# Patient Record
Sex: MALE | Race: OTHER | NOT HISPANIC OR LATINO | URBAN - METROPOLITAN AREA
[De-identification: names, ages, dates, MRNs, and addresses within clinical notes are randomized per-mention and may not be internally consistent; named-entity substitution may affect disease eponyms.]

---

## 2019-04-26 ENCOUNTER — EMERGENCY (EMERGENCY)
Facility: HOSPITAL | Age: 24
LOS: 1 days | Discharge: LEFT BEFORE DISCHARGE | End: 2019-04-26
Admitting: EMERGENCY MEDICINE

## 2019-04-26 VITALS
OXYGEN SATURATION: 100 % | TEMPERATURE: 99 F | DIASTOLIC BLOOD PRESSURE: 69 MMHG | RESPIRATION RATE: 18 BRPM | HEART RATE: 82 BPM | SYSTOLIC BLOOD PRESSURE: 143 MMHG
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
## 2022-02-15 ENCOUNTER — OUTPATIENT (OUTPATIENT)
Dept: OUTPATIENT SERVICES | Facility: HOSPITAL | Age: 27
LOS: 1 days | Discharge: HOME | End: 2022-02-15

## 2022-02-16 DIAGNOSIS — F32.A DEPRESSION, UNSPECIFIED: ICD-10-CM

## 2022-02-16 DIAGNOSIS — F12.20 CANNABIS DEPENDENCE, UNCOMPLICATED: ICD-10-CM

## 2023-10-11 ENCOUNTER — INPATIENT (INPATIENT)
Facility: HOSPITAL | Age: 28
LOS: 5 days | Discharge: HOME | End: 2023-10-17
Attending: INTERNAL MEDICINE | Admitting: INTERNAL MEDICINE
Payer: MEDICAID

## 2023-10-11 VITALS
OXYGEN SATURATION: 100 % | DIASTOLIC BLOOD PRESSURE: 98 MMHG | RESPIRATION RATE: 18 BRPM | SYSTOLIC BLOOD PRESSURE: 199 MMHG | HEART RATE: 127 BPM

## 2023-10-11 DIAGNOSIS — R45.1 RESTLESSNESS AND AGITATION: ICD-10-CM

## 2023-10-11 DIAGNOSIS — M50.21 OTHER CERVICAL DISC DISPLACEMENT, HIGH CERVICAL REGION: ICD-10-CM

## 2023-10-11 DIAGNOSIS — Z91.51 PERSONAL HISTORY OF SUICIDAL BEHAVIOR: ICD-10-CM

## 2023-10-11 DIAGNOSIS — M50.30 OTHER CERVICAL DISC DEGENERATION, UNSPECIFIED CERVICAL REGION: ICD-10-CM

## 2023-10-11 DIAGNOSIS — F31.9 BIPOLAR DISORDER, UNSPECIFIED: ICD-10-CM

## 2023-10-11 DIAGNOSIS — Z91.52 PERSONAL HISTORY OF NONSUICIDAL SELF-HARM: ICD-10-CM

## 2023-10-11 DIAGNOSIS — Z78.1 PHYSICAL RESTRAINT STATUS: ICD-10-CM

## 2023-10-11 LAB
ALBUMIN SERPL ELPH-MCNC: 4.2 G/DL — SIGNIFICANT CHANGE UP (ref 3.3–5)
ALP SERPL-CCNC: 72 U/L — SIGNIFICANT CHANGE UP (ref 40–120)
ALT FLD-CCNC: 30 U/L — SIGNIFICANT CHANGE UP (ref 12–78)
ANION GAP SERPL CALC-SCNC: 12 MMOL/L — SIGNIFICANT CHANGE UP (ref 5–17)
APAP SERPL-MCNC: < 2 UG/ML (ref 10–30)
AST SERPL-CCNC: 37 U/L — SIGNIFICANT CHANGE UP (ref 15–37)
BASOPHILS # BLD AUTO: 0.07 K/UL — SIGNIFICANT CHANGE UP (ref 0–0.2)
BASOPHILS NFR BLD AUTO: 0.7 % — SIGNIFICANT CHANGE UP (ref 0–2)
BILIRUB SERPL-MCNC: 1.1 MG/DL — SIGNIFICANT CHANGE UP (ref 0.2–1.2)
BUN SERPL-MCNC: 13 MG/DL — SIGNIFICANT CHANGE UP (ref 7–23)
CALCIUM SERPL-MCNC: 8.6 MG/DL — SIGNIFICANT CHANGE UP (ref 8.5–10.1)
CHLORIDE SERPL-SCNC: 104 MMOL/L — SIGNIFICANT CHANGE UP (ref 96–108)
CK SERPL-CCNC: 1043 U/L — HIGH (ref 26–308)
CO2 SERPL-SCNC: 23 MMOL/L — SIGNIFICANT CHANGE UP (ref 22–31)
CREAT SERPL-MCNC: 1.65 MG/DL — HIGH (ref 0.5–1.3)
EGFR: 57 ML/MIN/1.73M2 — LOW
EOSINOPHIL # BLD AUTO: 0.03 K/UL — SIGNIFICANT CHANGE UP (ref 0–0.5)
EOSINOPHIL NFR BLD AUTO: 0.3 % — SIGNIFICANT CHANGE UP (ref 0–6)
ETHANOL SERPL-MCNC: <10 MG/DL — SIGNIFICANT CHANGE UP (ref 0–10)
GLUCOSE SERPL-MCNC: 158 MG/DL — HIGH (ref 70–99)
HCT VFR BLD CALC: 44 % — SIGNIFICANT CHANGE UP (ref 39–50)
HGB BLD-MCNC: 14 G/DL — SIGNIFICANT CHANGE UP (ref 13–17)
IMM GRANULOCYTES NFR BLD AUTO: 0.6 % — SIGNIFICANT CHANGE UP (ref 0–0.9)
LYMPHOCYTES # BLD AUTO: 1.11 K/UL — SIGNIFICANT CHANGE UP (ref 1–3.3)
LYMPHOCYTES # BLD AUTO: 11.1 % — LOW (ref 13–44)
MCHC RBC-ENTMCNC: 23.1 PG — LOW (ref 27–34)
MCHC RBC-ENTMCNC: 31.8 G/DL — LOW (ref 32–36)
MCV RBC AUTO: 72.6 FL — LOW (ref 80–100)
MONOCYTES # BLD AUTO: 0.85 K/UL — SIGNIFICANT CHANGE UP (ref 0–0.9)
MONOCYTES NFR BLD AUTO: 8.5 % — SIGNIFICANT CHANGE UP (ref 2–14)
NEUTROPHILS # BLD AUTO: 7.89 K/UL — HIGH (ref 1.8–7.4)
NEUTROPHILS NFR BLD AUTO: 78.8 % — HIGH (ref 43–77)
NRBC # BLD: 0 /100 WBCS — SIGNIFICANT CHANGE UP (ref 0–0)
PLATELET # BLD AUTO: 221 K/UL — SIGNIFICANT CHANGE UP (ref 150–400)
POTASSIUM SERPL-MCNC: 3.1 MMOL/L — LOW (ref 3.5–5.3)
POTASSIUM SERPL-SCNC: 3.1 MMOL/L — LOW (ref 3.5–5.3)
PROT SERPL-MCNC: 8.3 GM/DL — SIGNIFICANT CHANGE UP (ref 6–8.3)
RBC # BLD: 6.06 M/UL — HIGH (ref 4.2–5.8)
RBC # FLD: 17.7 % — HIGH (ref 10.3–14.5)
SALICYLATES SERPL-MCNC: <1.7 MG/DL — LOW (ref 2.8–20)
SODIUM SERPL-SCNC: 139 MMOL/L — SIGNIFICANT CHANGE UP (ref 135–145)
WBC # BLD: 10.01 K/UL — SIGNIFICANT CHANGE UP (ref 3.8–10.5)
WBC # FLD AUTO: 10.01 K/UL — SIGNIFICANT CHANGE UP (ref 3.8–10.5)

## 2023-10-11 PROCEDURE — 99291 CRITICAL CARE FIRST HOUR: CPT | Mod: 25

## 2023-10-11 PROCEDURE — 70450 CT HEAD/BRAIN W/O DYE: CPT | Mod: 26,MA

## 2023-10-11 PROCEDURE — 93010 ELECTROCARDIOGRAM REPORT: CPT

## 2023-10-11 PROCEDURE — 71045 X-RAY EXAM CHEST 1 VIEW: CPT | Mod: 26

## 2023-10-11 PROCEDURE — 31500 INSERT EMERGENCY AIRWAY: CPT

## 2023-10-11 PROCEDURE — 72125 CT NECK SPINE W/O DYE: CPT | Mod: 26,MA

## 2023-10-11 PROCEDURE — 99292 CRITICAL CARE ADDL 30 MIN: CPT | Mod: 25

## 2023-10-11 RX ORDER — FENTANYL CITRATE 50 UG/ML
0.5 INJECTION INTRAVENOUS
Qty: 2500 | Refills: 0 | Status: DISCONTINUED | OUTPATIENT
Start: 2023-10-11 | End: 2023-10-12

## 2023-10-11 RX ORDER — KETAMINE HYDROCHLORIDE 100 MG/ML
400 INJECTION INTRAMUSCULAR; INTRAVENOUS ONCE
Refills: 0 | Status: DISCONTINUED | OUTPATIENT
Start: 2023-10-11 | End: 2023-10-11

## 2023-10-11 RX ORDER — POTASSIUM CHLORIDE 20 MEQ
10 PACKET (EA) ORAL
Refills: 0 | Status: COMPLETED | OUTPATIENT
Start: 2023-10-11 | End: 2023-10-11

## 2023-10-11 RX ORDER — PROPOFOL 10 MG/ML
10 INJECTION, EMULSION INTRAVENOUS
Qty: 1000 | Refills: 0 | Status: DISCONTINUED | OUTPATIENT
Start: 2023-10-11 | End: 2023-10-12

## 2023-10-11 RX ORDER — INFLUENZA VIRUS VACCINE 15; 15; 15; 15 UG/.5ML; UG/.5ML; UG/.5ML; UG/.5ML
0.5 SUSPENSION INTRAMUSCULAR ONCE
Refills: 0 | Status: DISCONTINUED | OUTPATIENT
Start: 2023-10-11 | End: 2023-10-17

## 2023-10-11 RX ORDER — SODIUM CHLORIDE 9 MG/ML
1000 INJECTION, SOLUTION INTRAVENOUS ONCE
Refills: 0 | Status: COMPLETED | OUTPATIENT
Start: 2023-10-11 | End: 2023-10-11

## 2023-10-11 RX ORDER — FENTANYL CITRATE 50 UG/ML
50 INJECTION INTRAVENOUS ONCE
Refills: 0 | Status: DISCONTINUED | OUTPATIENT
Start: 2023-10-11 | End: 2023-10-11

## 2023-10-11 RX ORDER — MIDAZOLAM HYDROCHLORIDE 1 MG/ML
0.02 INJECTION, SOLUTION INTRAMUSCULAR; INTRAVENOUS
Qty: 100 | Refills: 0 | Status: DISCONTINUED | OUTPATIENT
Start: 2023-10-11 | End: 2023-10-12

## 2023-10-11 RX ORDER — TETANUS TOXOID, REDUCED DIPHTHERIA TOXOID AND ACELLULAR PERTUSSIS VACCINE, ADSORBED 5; 2.5; 8; 8; 2.5 [IU]/.5ML; [IU]/.5ML; UG/.5ML; UG/.5ML; UG/.5ML
0.5 SUSPENSION INTRAMUSCULAR ONCE
Refills: 0 | Status: COMPLETED | OUTPATIENT
Start: 2023-10-11 | End: 2023-10-11

## 2023-10-11 RX ORDER — HEPARIN SODIUM 5000 [USP'U]/ML
5000 INJECTION INTRAVENOUS; SUBCUTANEOUS EVERY 8 HOURS
Refills: 0 | Status: DISCONTINUED | OUTPATIENT
Start: 2023-10-11 | End: 2023-10-13

## 2023-10-11 RX ORDER — SODIUM CHLORIDE 9 MG/ML
1000 INJECTION INTRAMUSCULAR; INTRAVENOUS; SUBCUTANEOUS ONCE
Refills: 0 | Status: COMPLETED | OUTPATIENT
Start: 2023-10-11 | End: 2023-10-11

## 2023-10-11 RX ORDER — CHLORHEXIDINE GLUCONATE 213 G/1000ML
1 SOLUTION TOPICAL
Refills: 0 | Status: DISCONTINUED | OUTPATIENT
Start: 2023-10-11 | End: 2023-10-17

## 2023-10-11 RX ORDER — PANTOPRAZOLE SODIUM 20 MG/1
40 TABLET, DELAYED RELEASE ORAL DAILY
Refills: 0 | Status: DISCONTINUED | OUTPATIENT
Start: 2023-10-11 | End: 2023-10-17

## 2023-10-11 RX ORDER — ROCURONIUM BROMIDE 10 MG/ML
80 VIAL (ML) INTRAVENOUS ONCE
Refills: 0 | Status: COMPLETED | OUTPATIENT
Start: 2023-10-11 | End: 2023-10-11

## 2023-10-11 RX ORDER — CHLORHEXIDINE GLUCONATE 213 G/1000ML
15 SOLUTION TOPICAL EVERY 12 HOURS
Refills: 0 | Status: DISCONTINUED | OUTPATIENT
Start: 2023-10-11 | End: 2023-10-12

## 2023-10-11 RX ORDER — MIDAZOLAM HYDROCHLORIDE 1 MG/ML
4 INJECTION, SOLUTION INTRAMUSCULAR; INTRAVENOUS ONCE
Refills: 0 | Status: DISCONTINUED | OUTPATIENT
Start: 2023-10-11 | End: 2023-10-11

## 2023-10-11 RX ADMIN — HEPARIN SODIUM 5000 UNIT(S): 5000 INJECTION INTRAVENOUS; SUBCUTANEOUS at 23:09

## 2023-10-11 RX ADMIN — MIDAZOLAM HYDROCHLORIDE 4 MILLIGRAM(S): 1 INJECTION, SOLUTION INTRAMUSCULAR; INTRAVENOUS at 16:50

## 2023-10-11 RX ADMIN — Medication 80 MILLIGRAM(S): at 15:47

## 2023-10-11 RX ADMIN — KETAMINE HYDROCHLORIDE 400 MILLIGRAM(S): 100 INJECTION INTRAMUSCULAR; INTRAVENOUS at 15:35

## 2023-10-11 RX ADMIN — Medication 100 MILLIEQUIVALENT(S): at 21:20

## 2023-10-11 RX ADMIN — PROPOFOL 4.8 MICROGRAM(S)/KG/MIN: 10 INJECTION, EMULSION INTRAVENOUS at 16:18

## 2023-10-11 RX ADMIN — Medication 100 MILLIEQUIVALENT(S): at 23:10

## 2023-10-11 RX ADMIN — SODIUM CHLORIDE 1000 MILLILITER(S): 9 INJECTION INTRAMUSCULAR; INTRAVENOUS; SUBCUTANEOUS at 16:22

## 2023-10-11 RX ADMIN — MIDAZOLAM HYDROCHLORIDE 1.63 MG/KG/HR: 1 INJECTION, SOLUTION INTRAMUSCULAR; INTRAVENOUS at 17:03

## 2023-10-11 RX ADMIN — TETANUS TOXOID, REDUCED DIPHTHERIA TOXOID AND ACELLULAR PERTUSSIS VACCINE, ADSORBED 0.5 MILLILITER(S): 5; 2.5; 8; 8; 2.5 SUSPENSION INTRAMUSCULAR at 16:18

## 2023-10-11 RX ADMIN — CHLORHEXIDINE GLUCONATE 15 MILLILITER(S): 213 SOLUTION TOPICAL at 23:08

## 2023-10-11 RX ADMIN — FENTANYL CITRATE 50 MICROGRAM(S): 50 INJECTION INTRAVENOUS at 18:54

## 2023-10-11 RX ADMIN — CHLORHEXIDINE GLUCONATE 1 APPLICATION(S): 213 SOLUTION TOPICAL at 23:05

## 2023-10-11 RX ADMIN — SODIUM CHLORIDE 1000 MILLILITER(S): 9 INJECTION INTRAMUSCULAR; INTRAVENOUS; SUBCUTANEOUS at 17:22

## 2023-10-11 RX ADMIN — FENTANYL CITRATE 50 MICROGRAM(S): 50 INJECTION INTRAVENOUS at 17:03

## 2023-10-11 RX ADMIN — SODIUM CHLORIDE 1000 MILLILITER(S): 9 INJECTION, SOLUTION INTRAVENOUS at 22:00

## 2023-10-11 RX ADMIN — Medication 100 MILLIEQUIVALENT(S): at 20:02

## 2023-10-11 RX ADMIN — FENTANYL CITRATE 4.08 MICROGRAM(S)/KG/HR: 50 INJECTION INTRAVENOUS at 17:43

## 2023-10-11 RX ADMIN — PROPOFOL 4.8 MICROGRAM(S)/KG/MIN: 10 INJECTION, EMULSION INTRAVENOUS at 19:25

## 2023-10-11 NOTE — PATIENT PROFILE ADULT - FALL HARM RISK - HARM RISK INTERVENTIONS
Assistance with ambulation/Assistance OOB with selected safe patient handling equipment/Communicate Risk of Fall with Harm to all staff/Discuss with provider need for PT consult/Monitor gait and stability/Reinforce activity limits and safety measures with patient and family/Tailored Fall Risk Interventions/Visual Cue: Yellow wristband and red socks/Bed in lowest position, wheels locked, appropriate side rails in place/Call bell, personal items and telephone in reach/Instruct patient to call for assistance before getting out of bed or chair/Non-slip footwear when patient is out of bed/[place] to call system/Physically safe environment - no spills, clutter or unnecessary equipment/Purposeful Proactive Rounding/Room/bathroom lighting operational, light cord in reach Assistance with ambulation/Assistance OOB with selected safe patient handling equipment/Communicate Risk of Fall with Harm to all staff/Discuss with provider need for PT consult/Monitor gait and stability/Reinforce activity limits and safety measures with patient and family/Tailored Fall Risk Interventions/Visual Cue: Yellow wristband and red socks/Bed in lowest position, wheels locked, appropriate side rails in place/Call bell, personal items and telephone in reach/Instruct patient to call for assistance before getting out of bed or chair/Non-slip footwear when patient is out of bed/[place] to call system/Physically safe environment - no spills, clutter or unnecessary equipment/Purposeful Proactive Rounding/Room/bathroom lighting operational, light cord in reach Assistance with ambulation/Assistance OOB with selected safe patient handling equipment/Communicate Risk of Fall with Harm to all staff/Discuss with provider need for PT consult/Monitor gait and stability/Reinforce activity limits and safety measures with patient and family/Tailored Fall Risk Interventions/Visual Cue: Yellow wristband and red socks/Bed in lowest position, wheels locked, appropriate side rails in place/Call bell, personal items and telephone in reach/Instruct patient to call for assistance before getting out of bed or chair/Non-slip footwear when patient is out of bed/[place] to call system/Physically safe environment - no spills, clutter or unnecessary equipment/Purposeful Proactive Rounding/Room/bathroom lighting operational, light cord in reach

## 2023-10-11 NOTE — ED PROVIDER NOTE - INTERNATIONAL TRAVEL DAYS 1
0-6 days (Citizen of [place]) 0-6 days ([place]) 0-6 days ([place])

## 2023-10-11 NOTE — ED PROVIDER NOTE - CRITICAL CARE ATTENDING CONTRIBUTION TO CARE
Seen with OTONIEL Sheppard    29yo male with pmh bipolar presenting agitated.  Called outside to ER entrance for patient who was acutely agitated, slamming his head on the wall screaming with a high likelihood of harming himself, staff, or other patients.  Friend who took him in notes over past few days has been having relationship issues and has been living with him.  Reports no hx of similar events. They smoked weed which patient's friend has also smoked but feels fine.  Multiple scattered superficial abrasions, no gross deformities.  Diaphoretic, screaming nonsense.  Patient required rapid sedation for patient and staff safety, elected for RSI given the severe agitation, possible underlying medial etiology, and need for further work up.  Plan for labs, tox, cth r/o ich, icu admission.    Upon my evaluation, this patient had a high probability of imminent or life-threatening deterioration due to severe agitation, which required my direct attention, intervention, and personal management.  The patient has a  medical condition that impairs one or more vital organ systems.  Frequent personal assessment and adjustment of medical interventions was performed.      I have personally provided 90 minutes of critical care time exclusive of time spent on separately billable procedures. Time includes review of laboratory data, radiology results, discussion with consultants, patient and family; monitoring for potential decompensation, as well as time spent retrieving data and reviewing the chart and documenting the visit. Interventions were performed as documented above. Seen with OTONIEL Sheppard    27yo male with pmh bipolar presenting agitated.  Called outside to ER entrance for patient who was acutely agitated, slamming his head on the wall screaming with a high likelihood of harming himself, staff, or other patients.  Friend who took him in notes over past few days has been having relationship issues and has been living with him.  Reports no hx of similar events. They smoked weed which patient's friend has also smoked but feels fine.  Multiple scattered superficial abrasions, no gross deformities.  Diaphoretic, screaming nonsense.  Patient required rapid sedation for patient and staff safety, elected for RSI given the severe agitation, possible underlying medial etiology, and need for further work up.  Plan for labs, tox, cth r/o ich, icu admission.    Upon my evaluation, this patient had a high probability of imminent or life-threatening deterioration due to severe agitation, which required my direct attention, intervention, and personal management.  The patient has a  medical condition that impairs one or more vital organ systems.  Frequent personal assessment and adjustment of medical interventions was performed.      I have personally provided 90 minutes of critical care time exclusive of time spent on separately billable procedures. Time includes review of laboratory data, radiology results, discussion with consultants, patient and family; monitoring for potential decompensation, as well as time spent retrieving data and reviewing the chart and documenting the visit. Interventions were performed as documented above.

## 2023-10-11 NOTE — ED PROVIDER NOTE - PHYSICAL EXAMINATION
Patient agitated, unable to redirect, moving all extremities, abrasion to bilateral feet/legs, no obvious deformities, lungs clear, abdomen soft nontender, abrasion to tongue, +blood in mouth, patient intubated for safety ,in 4 point restraints after sedation.
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
## 2023-10-11 NOTE — PATIENT PROFILE ADULT - DO YOU FEEL LIKE HURTING YOURSELF OR OTHERS?
Telephone Encounter by Mayra Chau at 06/27/18 03:10 PM     Author:  Conception Challenger Service:  (none) Author Type:      Filed:  06/27/18 03:12 PM Encounter Date:  6/27/2018 Status:  Signed     :  Mayra Chau ()            Left[MR1.1T] voicemail message[MR1.1M] requesting patient to return call to Radiology Dept at [phone][MR1.1T] to reschedule missed appointment. [MR1.1M]      Revision History        User Key Date/Time User Provider Type Action    > MR1.1 06/27/18 03:12 PM Conception Challenger  Sign    M - Manual, T - Template no

## 2023-10-11 NOTE — ED ADULT NURSE REASSESSMENT NOTE - NS ED NURSE REASSESS COMMENT FT1
Report from Noemy HAN . Introduced self and identified pt. Pt intubated 27 cm at the lip line 7.5 fr tube, vitals are stable, prop at 60 versed at .11 and fent at 1.5. Pt not pulling at lines, tolerating mechanical ventilation well. IV patent and intact. 16 G pillai draining pale yellow urine. OG tube in place, no suction.

## 2023-10-11 NOTE — PATIENT PROFILE ADULT - CAREGIVER ADDRESS
[address] [place], [place] [id number] [address] [place], [place] [id number] [address] [place], [place] [id number]

## 2023-10-11 NOTE — H&P ADULT - ASSESSMENT
28 year old male with a PMH of bipolar disorder, s/p prior suicide attempt, h/o cutting and inpatient stay ~1 year ago who presenting to ICU for tx of:    Psychosis / Agitation requiring intubation  Bipolar disorder with prior h/o suicide attempt  Substance abuse disorder  KIM, unsure of baseline creatinine   Elevated CK    Plan:  admit to ICU, monitor vitals per policy  maintain ETT, abg prn, wean vent settings as tolerated; may try sat/sbt in AM   sedation from ED with versed / fentanyl / propofol; will try to wean versed first overnight if pt remains calm, goal RASS 0/-1  fup with ortho / spine regarding CT results, currently no acute intervention required  fup tox screen  NGT in place per CXR, will keep NPO overnight, if pt requires prolonged intubation will start TF for nutrition; monitor blood glucose levels  K repletion from ED, fup labs in AM; trend CK giving addition liter of LR now  srict i/o's, monitor u/o, bun / creatinine  restart home psych med regimen if able  sqh for dvt prophylaxis  ppi for gi prophylaxis  full code    Pt will need psychiatric consult and social work support.    d/w attending MD     28 year old male with a PMH of bipolar disorder, s/p prior suicide attempt, h/o cutting and inpatient stay ~1 year ago who presenting to ICU for tx of:    Psychosis / Agitation requiring intubation  Bipolar disorder with prior h/o suicide attempt  Substance abuse disorder  KIM, unsure of baseline creatinine   Elevated CK    Plan:  admit to ICU, monitor vitals per policy  maintain ETT, abg prn, wean vent settings as tolerated; may try sat/sbt in AM   sedation from ED with versed / fentanyl / propofol; will try to wean versed first overnight if pt remains calm, goal RASS 0/-1  fup with ortho / spine regarding CT results, currently no acute intervention required  fup tox screen  NGT in place per CXR, will keep NPO overnight, if pt requires prolonged intubation will start TF for nutrition; monitor blood glucose levels  K repletion from ED, fup labs in AM; trend CK giving addition liter of LR now  srict i/o's, monitor u/o, bun / creatinine  restart home psych med regimen if able  sqh for dvt prophylaxis  ppi for gi prophylaxis  full code    Pt will need psychiatric consult and social work support.    d/w attending MD      ATTENDING ATTESTATION:    Patient was not seen by me on this date

## 2023-10-11 NOTE — H&P ADULT - HISTORY OF PRESENT ILLNESS
Mr. Ariza is a 28 year old male with a PMH of bipolar disorder, s/p prior suicide attempt, h/o cutting and inpatient stay ~1 year ago who presented to [facility] ED earlier today brought in by his friend for "bizarre behavior." Per his friend, pt has recently been stressed and had been smoking marijuana and became noticeably agitated causing his friends to become concerned he was going to have a psychiatric breakdown. Upon arrival tonight, pt fell outside hospital, hit his head and was unable to answer any questions. Prior to this, pt reportedly would not speak or move. While in ED, pt reportedly became extremely agitated, thrashing his body around, hitting his head, swinging his arms / legs with code grey called. He was given ketamine IM, however was requiring 4 pt restraints and ED physician intubated for patient and staff safety. ICU team consulted.    CT head negative, but CT spine noting Cervical intervertebral disc spaces show disc degeneration and spondylosis at C2-3 through C5-6 with mild lossof disc height. Small LEFT paramedian disc herniation noted at C2-3 which indents the ventral cord. Narrowing of the BILATERAL C3-4, C4-5 neural foramina due to uncovertebral spurring and facet osteophytic hypertrophy. ED PA d/w ortho / spine with no immediate intervention required.       Mr. Ariza is a 28 year old male with a PMH of bipolar disorder, s/p prior suicide attempt, h/o cutting and inpatient stay ~1 year ago who presented to [facility] ED earlier today brought in by his friend for "bizarre behavior." Per his friend, pt has recently been stressed and had been smoking marijuana and became noticeably agitated causing his friends to become concerned he was going to have a psychiatric breakdown. Upon arrival tonight, pt fell outside hospital, hit his head and was unable to answer any questions. Prior to this, pt reportedly would not speak or move. While in ED, pt reportedly became extremely agitated, thrashing his body around, hitting his head, swinging his arms / legs with code grey called. He was given ketamine IM, however was requiring 4 pt restraints and ED physician intubated for patient and staff safety. ICU team consulted.    CT head negative, but CT spine noting Cervical intervertebral disc spaces show disc degeneration and spondylosis at C2-3 through C5-6 with mild lossof disc height. Small LEFT paramedian disc herniation noted at C2-3 which indents the ventral cord. Narrowing of the BILATERAL C3-4, C4-5 neural foramina due to uncovertebral spurring and facet osteophytic hypertrophy. ED PA d/w ortho / spine with no immediate intervention required.       Mr. Ariza is a 28 year old male with a PMH of bipolar disorder, s/p prior suicide attempt, h/o cutting and inpatient stay ~1 year ago who presented to [facility] ED earlier today brought in by his friend for "bizarre behavior." Per his friend, pt has recently been stressed and had been smoking marijuana and became noticeably agitated causing his friends to become concerned he was going to have a psychiatric breakdown. Upon arrival tonight, pt fell outside hospital, hit his head and was unable to answer any questions. Prior to this, pt reportedly would not speak or move. While in ED, pt reportedly became extremely agitated, thrashing his body around, hitting his head, swinging his arms / legs with code grey called. He was given ketamine IM, however was requiring 4 pt restraints and ED physician intubated for patient and staff safety. ICU team consulted.    CT head negative, but CT spine noting Cervical intervertebral disc spaces show disc degeneration and spondylosis at C2-3 through C5-6 with mild lossof disc height. Small LEFT paramedian disc herniation noted at C2-3 which indents the ventral cord. Narrowing of the BILATERAL C3-4, C4-5 neural foramina due to uncovertebral spurring and facet osteophytic hypertrophy. ED PA d/w ortho / spine with no immediate intervention required.

## 2023-10-11 NOTE — ED PROVIDER NOTE - PROGRESS NOTE DETAILS
Landon: Patient has been well sedated rss -4, restraints discontinued.  Awaiting icu eval OTONIEL Sheppard: spoke with ICU, requested ortho/spine eval for disc bulge, patient sedated, unable to be examined, ortho aware, no deficit prior to sedation, nothing to do, will admit to ICU, ACP aware.

## 2023-10-11 NOTE — PATIENT PROFILE ADULT - INTERNATIONAL TRAVEL DAYS 1
0-6 days (Citizen of [place]) 0-6 days ([place]) 0-6 days ([place])

## 2023-10-11 NOTE — H&P ADULT - NSHPLABSRESULTS_GEN_ALL_CORE
CBC Full  -  ( 11 Oct 2023 16:36 )  WBC Count : 10.01 K/uL  RBC Count : 6.06 M/uL  Hemoglobin : 14.0 g/dL  Hematocrit : 44.0 %  Platelet Count - Automated : 221 K/uL  Mean Cell Volume : 72.6 fl  Mean Cell Hemoglobin : 23.1 pg  Mean Cell Hemoglobin Concentration : 31.8 g/dL  Auto Neutrophil # : 7.89 K/uL  Auto Lymphocyte # : 1.11 K/uL  Auto Monocyte # : 0.85 K/uL  Auto Eosinophil # : 0.03 K/uL  Auto Basophil # : 0.07 K/uL  Auto Neutrophil % : 78.8 %  Auto Lymphocyte % : 11.1 %  Auto Monocyte % : 8.5 %  Auto Eosinophil % : 0.3 %  Auto Basophil % : 0.7 %      10-11    139  |  104  |  13  ----------------------------<  158<H>  3.1<L>   |  23  |  1.65<H>    Ca    8.6      11 Oct 2023 16:36  Phos  3.2     10-11  Mg     2.1     10-11    TPro  8.3  /  Alb  4.2  /  TBili  1.1  /  DBili  x   /  AST  37  /  ALT  30  /  AlkPhos  72  10-11    LIVER FUNCTIONS - ( 11 Oct 2023 16:36 )  Alb: 4.2 g/dL / Pro: 8.3 gm/dL / ALK PHOS: 72 U/L / ALT: 30 U/L / AST: 37 U/L / GGT: x             Urinalysis Basic - ( 11 Oct 2023 16:36 )  Color: x / Appearance: x / SG: x / pH: x  Gluc: 158 mg/dL / Ketone: x  / Bili: x / Urobili: x   Blood: x / Protein: x / Nitrite: x   Leuk Esterase: x / RBC: x / WBC x   Sq Epi: x / Non Sq Epi: x / Bacteria: x      ABG - ( 11 Oct 2023 17:27 )  pH, Arterial: 7.41  pH, Blood: x     /  pCO2: 39    /  pO2: 110   / HCO3: 25    / Base Excess: 0.1   /  SaO2: 99.0        TOXICOLOGY PENDING

## 2023-10-11 NOTE — ED ADULT NURSE NOTE - NSFALLUNIVINTERV_ED_ALL_ED
Bed/Stretcher in lowest position, wheels locked, appropriate side rails in place/Call bell, personal items and telephone in reach/Instruct patient to call for assistance before getting out of bed/chair/stretcher/Non-slip footwear applied when patient is off stretcher/[place] to call system/Physically safe environment - no spills, clutter or unnecessary equipment/Purposeful proactive rounding/Room/bathroom lighting operational, light cord in reach Bed/Stretcher in lowest position, wheels locked, appropriate side rails in place/Call bell, personal items and telephone in reach/Instruct patient to call for assistance before getting out of bed/chair/stretcher/Non-slip footwear applied when patient is off stretcher/[place] to call system/Physically safe environment - no spills, clutter or unnecessary equipment/Purposeful proactive rounding/Room/bathroom lighting operational, light cord in reach Bed/Stretcher in lowest position, wheels locked, appropriate side rails in place/Call bell, personal items and telephone in reach/Instruct patient to call for assistance before getting out of bed/chair/stretcher/Non-slip footwear applied when patient is off stretcher/[place] to call system/Physically safe environment - no spills, clutter or unnecessary equipment/Purposeful proactive rounding/Room/bathroom lighting operational, light cord in reach

## 2023-10-11 NOTE — ED PROVIDER NOTE - CLINICAL SUMMARY MEDICAL DECISION MAKING FREE TEXT BOX
28y Male with past medical history of bipolar presents to the ER agitated and bizarre behavior, given IM ketamine, put in 4 point restraints, sedated and intubated, additional medications added for sedation, concern for psych verse intox, will get labs, meds, IVF, CXR, CT, plan for admission to ICU, eventually need psych evaluation.

## 2023-10-11 NOTE — ED ADULT NURSE REASSESSMENT NOTE - NS ED NURSE REASSESS COMMENT FT1
patient transported to CT scan with RN, RT, and ED tech. patient on portable cardiac monitor, Zoll, and has IV sedation infusing as ordered. CT scan completed and patient [id number] back to ER. patient sedated and restraints were able to be removed, order discontinued, as patient is no longer attempting to pull at lines. family updated on plan of care. patient transported to CT scan with RN, RT, and ED tech. patient on portable cardiac monitor, Zoll, and has IV sedation infusing as ordered. CT scan completed and patient [id number] back to ER. patient sedated and restraints were able to be removed, order discontinued, as patient is no longer attempting to pull at lines. family updated on plan of care. patient transported to CT scan with RN, RT, and ED tech. patient on portable cardiac monitor, Zoll, and has IV sedation infusing as ordered. CT scan completed and patient [id number] back to ER. patient sedated and restraints were able to be removed, order discontinued, as patient is no longer attempting to pull at lines. family updated on plan of care.

## 2023-10-11 NOTE — ED PROVIDER NOTE - OBJECTIVE STATEMENT
28y Male with past medical history of bipolar, one prior suicide attempt with psych admission last year presents to the ER with friend for bizarre behavior. Per friend, patient has been having some personal stressors again, spent the night with him and hungout with friends today, spoked marijuana and was making music, states he noticed patient becoming more in his head and fidgety so they left, concern he was going to have a break down, was dropping friends home then planning to come to ER, prior to arrival, patient was alert but would not speak or move, when got to the ER, patient needed help getting out then fell outside, +headstrike,  team called outside to assist and patient was unable to answer any questions     Patient was agitated, throwing his body, hitting head, swinging arms and legs, code grey called for additional assistance, patient given 400mg IM Ketamine which took time to kick in, patient was put in 4 point restraints for the safety of patient and stay, decision was made to intubate as patient required more medications to calm him down.

## 2023-10-11 NOTE — ED PROCEDURE NOTE - NS ED PROCEDURE NOTE1 TUBE APPROPRIATE POSITION
Itraconazole Counseling:  I discussed with the patient the risks of itraconazole including but not limited to liver damage, nausea/vomiting, neuropathy, and severe allergy.  The patient understands that this medication is best absorbed when taken with acidic beverages such as non-diet cola or ginger ale.  The patient understands that monitoring is required including baseline LFTs and repeat LFTs at intervals.  The patient understands that they are to contact us or the primary physician if concerning signs are noted. Ilumya Pregnancy And Lactation Text: The risk during pregnancy and breastfeeding is uncertain with this medication. Cimzia Pregnancy And Lactation Text: This medication crosses the placenta but can be considered safe in certain situations. Cimzia may be excreted in breast milk. Minoxidil Pregnancy And Lactation Text: This medication has not been assigned a Pregnancy Risk Category but animal studies failed to show danger with the topical medication. It is unknown if the medication is excreted in breast milk. Topical Retinoid counseling:  Patient advised to apply a pea-sized amount only at bedtime and wait 30 minutes after washing their face before applying.  If too drying, patient may add a non-comedogenic moisturizer. The patient verbalized understanding of the proper use and possible adverse effects of retinoids.  All of the patient's questions and concerns were addressed. Hydroxyzine Counseling: Patient advised that the medication is sedating and not to drive a car after taking this medication.  Patient informed of potential adverse effects including but not limited to dry mouth, urinary retention, and blurry vision.  The patient verbalized understanding of the proper use and possible adverse effects of hydroxyzine.  All of the patient's questions and concerns were addressed. Ivermectin Pregnancy And Lactation Text: This medication is Pregnancy Category C and it isn't known if it is safe during pregnancy. It is also excreted in breast milk. Acitretin Counseling:  I discussed with the patient the risks of acitretin including but not limited to hair loss, dry lips/skin/eyes, liver damage, hyperlipidemia, depression/suicidal ideation, photosensitivity.  Serious rare side effects can include but are not limited to pancreatitis, pseudotumor cerebri, bony changes, clot formation/stroke/heart attack.  Patient understands that alcohol is contraindicated since it can result in liver toxicity and significantly prolong the elimination of the drug by many years. Elidel Pregnancy And Lactation Text: This medication is Pregnancy Category C. It is unknown if this medication is excreted in breast milk. Thalidomide Pregnancy And Lactation Text: This medication is Pregnancy Category X and is absolutely contraindicated during pregnancy. It is unknown if it is excreted in breast milk. High Dose Vitamin A Pregnancy And Lactation Text: High dose vitamin A therapy is contraindicated during pregnancy and breast feeding. Enbrel Counseling:  I discussed with the patient the risks of etanercept including but not limited to myelosuppression, immunosuppression, autoimmune hepatitis, demyelinating diseases, lymphoma, and infections.  The patient understands that monitoring is required including a PPD at baseline and must alert us or the primary physician if symptoms of infection or other concerning signs are noted. Tube in appropriate position per imaging. Tetracycline Pregnancy And Lactation Text: This medication is Pregnancy Category D and not consider safe during pregnancy. It is also excreted in breast milk. High Dose Vitamin A Counseling: Side effects reviewed, pt to contact office should one occur. Cephalexin Pregnancy And Lactation Text: This medication is Pregnancy Category B and considered safe during pregnancy.  It is also excreted in breast milk but can be used safely for shorter doses. Azathioprine Counseling:  I discussed with the patient the risks of azathioprine including but not limited to myelosuppression, immunosuppression, hepatotoxicity, lymphoma, and infections.  The patient understands that monitoring is required including baseline LFTs, Creatinine, possible TPMP genotyping and weekly CBCs for the first month and then every 2 weeks thereafter.  The patient verbalized understanding of the proper use and possible adverse effects of azathioprine.  All of the patient's questions and concerns were addressed. Prednisone Counseling:  I discussed with the patient the risks of prolonged use of prednisone including but not limited to weight gain, insomnia, osteoporosis, mood changes, diabetes, susceptibility to infection, glaucoma and high blood pressure.  In cases where prednisone use is prolonged, patients should be monitored with blood pressure checks, serum glucose levels and an eye exam.  Additionally, the patient may need to be placed on GI prophylaxis, PCP prophylaxis, and calcium and vitamin D supplementation and/or a bisphosphonate.  The patient verbalized understanding of the proper use and the possible adverse effects of prednisone.  All of the patient's questions and concerns were addressed. Use Enhanced Medication Counseling?: No Cimetidine Counseling:  I discussed with the patient the risks of Cimetidine including but not limited to gynecomastia, headache, diarrhea, nausea, drowsiness, arrhythmias, pancreatitis, skin rashes, psychosis, bone marrow suppression and kidney toxicity. Cimetidine Pregnancy And Lactation Text: This medication is Pregnancy Category B and is considered safe during pregnancy. It is also excreted in breast milk and breast feeding isn't recommended. Topical Sulfur Applications Counseling: Topical Sulfur Counseling: Patient counseled that this medication may cause skin irritation or allergic reactions.  In the event of skin irritation, the patient was advised to reduce the amount of the drug applied or use it less frequently.   The patient verbalized understanding of the proper use and possible adverse effects of topical sulfur application.  All of the patient's questions and concerns were addressed. Rituxan Counseling:  I discussed with the patient the risks of Rituxan infusions. Side effects can include infusion reactions, severe drug rashes including mucocutaneous reactions, reactivation of latent hepatitis and other infections and rarely progressive multifocal leukoencephalopathy.  All of the patient's questions and concerns were addressed. Thalidomide Counseling: I discussed with the patient the risks of thalidomide including but not limited to birth defects, anxiety, weakness, chest pain, dizziness, cough and severe allergy. Dapsone Pregnancy And Lactation Text: This medication is Pregnancy Category C and is not considered safe during pregnancy or breast feeding. Isotretinoin Pregnancy And Lactation Text: This medication is Pregnancy Category X and is considered extremely dangerous during pregnancy. It is unknown if it is excreted in breast milk. Doxycycline Pregnancy And Lactation Text: This medication is Pregnancy Category D and not consider safe during pregnancy. It is also excreted in breast milk but is considered safe for shorter treatment courses. Topical Clindamycin Pregnancy And Lactation Text: This medication is Pregnancy Category B and is considered safe during pregnancy. It is unknown if it is excreted in breast milk. Cyclophosphamide Counseling:  I discussed with the patient the risks of cyclophosphamide including but not limited to hair loss, hormonal abnormalities, decreased fertility, abdominal pain, diarrhea, nausea and vomiting, bone marrow suppression and infection. The patient understands that monitoring is required while taking this medication. Eucrisa Counseling: Patient may experience a mild burning sensation during topical application. Eucrisa is not approved in children less than 2 years of age. Topical Sulfur Applications Pregnancy And Lactation Text: This medication is Pregnancy Category C and has an unknown safety profile during pregnancy. It is unknown if this topical medication is excreted in breast milk. Cyclosporine Counseling:  I discussed with the patient the risks of cyclosporine including but not limited to hypertension, gingival hyperplasia,myelosuppression, immunosuppression, liver damage, kidney damage, neurotoxicity, lymphoma, and serious infections. The patient understands that monitoring is required including baseline blood pressure, CBC, CMP, lipid panel and uric acid, and then 1-2 times monthly CMP and blood pressure. Zyclara Counseling:  I discussed with the patient the risks of imiquimod including but not limited to erythema, scaling, itching, weeping, crusting, and pain.  Patient understands that the inflammatory response to imiquimod is variable from person to person and was educated regarded proper titration schedule.  If flu-like symptoms develop, patient knows to discontinue the medication and contact us. Bactrim Pregnancy And Lactation Text: This medication is Pregnancy Category D and is known to cause fetal risk.  It is also excreted in breast milk. Cephalexin Counseling: I counseled the patient regarding use of cephalexin as an antibiotic for prophylactic and/or therapeutic purposes. Cephalexin (commonly prescribed under brand name Keflex) is a cephalosporin antibiotic which is active against numerous classes of bacteria, including most skin bacteria. Side effects may include nausea, diarrhea, gastrointestinal upset, rash, hives, yeast infections, and in rare cases, hepatitis, kidney disease, seizures, fever, confusion, neurologic symptoms, and others. Patients with severe allergies to penicillin medications are cautioned that there is about a 10% incidence of cross-reactivity with cephalosporins. When possible, patients with penicillin allergies should use alternatives to cephalosporins for antibiotic therapy. Nsaids Pregnancy And Lactation Text: These medications are considered safe up to 30 weeks gestation. It is excreted in breast milk. Clofazimine Pregnancy And Lactation Text: This medication is Pregnancy Category C and isn't considered safe during pregnancy. It is excreted in breast milk. Doxycycline Counseling:  Patient counseled regarding possible photosensitivity and increased risk for sunburn.  Patient instructed to avoid sunlight, if possible.  When exposed to sunlight, patients should wear protective clothing, sunglasses, and sunscreen.  The patient was instructed to call the office immediately if the following severe adverse effects occur:  hearing changes, easy bruising/bleeding, severe headache, or vision changes.  The patient verbalized understanding of the proper use and possible adverse effects of doxycycline.  All of the patient's questions and concerns were addressed. Carac Pregnancy And Lactation Text: This medication is Pregnancy Category X and contraindicated in pregnancy and in women who may become pregnant. It is unknown if this medication is excreted in breast milk. Simponi Counseling:  I discussed with the patient the risks of golimumab including but not limited to myelosuppression, immunosuppression, autoimmune hepatitis, demyelinating diseases, lymphoma, and serious infections.  The patient understands that monitoring is required including a PPD at baseline and must alert us or the primary physician if symptoms of infection or other concerning signs are noted. Xolair Pregnancy And Lactation Text: This medication is Pregnancy Category B and is considered safe during pregnancy. This medication is excreted in breast milk. Methotrexate Counseling:  Patient counseled regarding adverse effects of methotrexate including but not limited to nausea, vomiting, abnormalities in liver function tests. Patients may develop mouth sores, rash, diarrhea, and abnormalities in blood counts. The patient understands that monitoring is required including LFT's and blood counts.  There is a rare possibility of scarring of the liver and lung problems that can occur when taking methotrexate. Persistent nausea, loss of appetite, pale stools, dark urine, cough, and shortness of breath should be reported immediately. Patient advised to discontinue methotrexate treatment at least three months before attempting to become pregnant.  I discussed the need for folate supplements while taking methotrexate.  These supplements can decrease side effects during methotrexate treatment. The patient verbalized understanding of the proper use and possible adverse effects of methotrexate.  All of the patient's questions and concerns were addressed. Dupixent Counseling: I discussed with the patient the risks of dupilumab including but not limited to eye infection and irritation, cold sores, injection site reactions, worsening of asthma, allergic reactions and increased risk of parasitic infection.  Live vaccines should be avoided while taking dupilumab. Dupilumab will also interact with certain medications such as warfarin and cyclosporine. The patient understands that monitoring is required and they must alert us or the primary physician if symptoms of infection or other concerning signs are noted. Rituxan Pregnancy And Lactation Text: This medication is Pregnancy Category C and it isn't know if it is safe during pregnancy. It is unknown if this medication is excreted in breast milk but similar antibodies are known to be excreted. Drysol Counseling:  I discussed with the patient the risks of drysol/aluminum chloride including but not limited to skin rash, itching, irritation, burning. Hydroquinone Counseling:  Patient advised that medication may result in skin irritation, lightening (hypopigmentation), dryness, and burning.  In the event of skin irritation, the patient was advised to reduce the amount of the drug applied or use it less frequently.  Rarely, spots that are treated with hydroquinone can become darker (pseudoochronosis).  Should this occur, patient instructed to stop medication and call the office. The patient verbalized understanding of the proper use and possible adverse effects of hydroquinone.  All of the patient's questions and concerns were addressed. Valtrex Pregnancy And Lactation Text: this medication is Pregnancy Category B and is considered safe during pregnancy. This medication is not directly found in breast milk but it's metabolite acyclovir is present. Tremfya Counseling: I discussed with the patient the risks of guselkumab including but not limited to immunosuppression, serious infections, worsening of inflammatory bowel disease and drug reactions.  The patient understands that monitoring is required including a PPD at baseline and must alert us or the primary physician if symptoms of infection or other concerning signs are noted. Sski Pregnancy And Lactation Text: This medication is Pregnancy Category D and isn't considered safe during pregnancy. It is excreted in breast milk. Metronidazole Pregnancy And Lactation Text: This medication is Pregnancy Category B and considered safe during pregnancy.  It is also excreted in breast milk. Humira Counseling:  I discussed with the patient the risks of adalimumab including but not limited to myelosuppression, immunosuppression, autoimmune hepatitis, demyelinating diseases, lymphoma, and serious infections.  The patient understands that monitoring is required including a PPD at baseline and must alert us or the primary physician if symptoms of infection or other concerning signs are noted. Albendazole Counseling:  I discussed with the patient the risks of albendazole including but not limited to cytopenia, kidney damage, nausea/vomiting and severe allergy.  The patient understands that this medication is being used in an off-label manner. Odomzo Counseling- I discussed with the patient the risks of Odomzo including but not limited to nausea, vomiting, diarrhea, constipation, weight loss, changes in the sense of taste, decreased appetite, muscle spasms, and hair loss.  The patient verbalized understanding of the proper use and possible adverse effects of Odomzo.  All of the patient's questions and concerns were addressed. Cellcept Pregnancy And Lactation Text: This medication is Pregnancy Category D and isn't considered safe during pregnancy. It is unknown if this medication is excreted in breast milk. Xolair Counseling:  Patient informed of potential adverse effects including but not limited to fever, muscle aches, rash and allergic reactions.  The patient verbalized understanding of the proper use and possible adverse effects of Xolair.  All of the patient's questions and concerns were addressed. Minoxidil Counseling: Minoxidil is a topical medication which can increase blood flow where it is applied. It is uncertain how this medication increases hair growth. Side effects are uncommon and include stinging and allergic reactions. Rifampin Pregnancy And Lactation Text: This medication is Pregnancy Category C and it isn't know if it is safe during pregnancy. It is also excreted in breast milk and should not be used if you are breast feeding. Elidel Counseling: Patient may experience a mild burning sensation during topical application. Elidel is not approved in children less than 2 years of age. There have been case reports of hematologic and skin malignancies in patients using topical calcineurin inhibitors although causality is questionable. Cosentyx Counseling:  I discussed with the patient the risks of Cosentyx including but not limited to worsening of Crohn's disease, immunosuppression, allergic reactions and infections.  The patient understands that monitoring is required including a PPD at baseline and must alert us or the primary physician if symptoms of infection or other concerning signs are noted. Methotrexate Pregnancy And Lactation Text: This medication is Pregnancy Category X and is known to cause fetal harm. This medication is excreted in breast milk. Drysol Pregnancy And Lactation Text: This medication is considered safe during pregnancy and breast feeding. Doxepin Pregnancy And Lactation Text: This medication is Pregnancy Category C and it isn't known if it is safe during pregnancy. It is also excreted in breast milk and breast feeding isn't recommended. Colchicine Counseling:  Patient counseled regarding adverse effects including but not limited to stomach upset (nausea, vomiting, stomach pain, or diarrhea).  Patient instructed to limit alcohol consumption while taking this medication.  Colchicine may reduce blood counts especially with prolonged use.  The patient understands that monitoring of kidney function and blood counts may be required, especially at baseline. The patient verbalized understanding of the proper use and possible adverse effects of colchicine.  All of the patient's questions and concerns were addressed. Solaraze Pregnancy And Lactation Text: This medication is Pregnancy Category B and is considered safe. There is some data to suggest avoiding during the third trimester. It is unknown if this medication is excreted in breast milk. Griseofulvin Counseling:  I discussed with the patient the risks of griseofulvin including but not limited to photosensitivity, cytopenia, liver damage, nausea/vomiting and severe allergy.  The patient understands that this medication is best absorbed when taken with a fatty meal (e.g., ice cream or french fries). Otezla Pregnancy And Lactation Text: This medication is Pregnancy Category C and it isn't known if it is safe during pregnancy. It is unknown if it is excreted in breast milk. Glycopyrrolate Pregnancy And Lactation Text: This medication is Pregnancy Category B and is considered safe during pregnancy. It is unknown if it is excreted breast milk. Spironolactone Pregnancy And Lactation Text: This medication can cause feminization of the male fetus and should be avoided during pregnancy. The active metabolite is also found in breast milk. Gabapentin Counseling: I discussed with the patient the risks of gabapentin including but not limited to dizziness, somnolence, fatigue and ataxia. Imiquimod Counseling:  I discussed with the patient the risks of imiquimod including but not limited to erythema, scaling, itching, weeping, crusting, and pain.  Patient understands that the inflammatory response to imiquimod is variable from person to person and was educated regarded proper titration schedule.  If flu-like symptoms develop, patient knows to discontinue the medication and contact us. Ketoconazole Pregnancy And Lactation Text: This medication is Pregnancy Category C and it isn't know if it is safe during pregnancy. It is also excreted in breast milk and breast feeding isn't recommended. Benzoyl Peroxide Counseling: Patient counseled that medicine may cause skin irritation and bleach clothing.  In the event of skin irritation, the patient was advised to reduce the amount of the drug applied or use it less frequently.   The patient verbalized understanding of the proper use and possible adverse effects of benzoyl peroxide.  All of the patient's questions and concerns were addressed. Valtrex Counseling: I discussed with the patient the risks of valacyclovir including but not limited to kidney damage, nausea, vomiting and severe allergy.  The patient understands that if the infection seems to be worsening or is not improving, they are to call. Acitretin Pregnancy And Lactation Text: This medication is Pregnancy Category X and should not be given to women who are pregnant or may become pregnant in the future. This medication is excreted in breast milk. Picato Counseling:  I discussed with the patient the risks of Picato including but not limited to erythema, scaling, itching, weeping, crusting, and pain. Clindamycin Pregnancy And Lactation Text: This medication can be used in pregnancy if certain situations. Clindamycin is also present in breast milk. Taltz Counseling: I discussed with the patient the risks of ixekizumab including but not limited to immunosuppression, serious infections, worsening of inflammatory bowel disease and drug reactions.  The patient understands that monitoring is required including a PPD at baseline and must alert us or the primary physician if symptoms of infection or other concerning signs are noted. Infliximab Counseling:  I discussed with the patient the risks of infliximab including but not limited to myelosuppression, immunosuppression, autoimmune hepatitis, demyelinating diseases, lymphoma, and serious infections.  The patient understands that monitoring is required including a PPD at baseline and must alert us or the primary physician if symptoms of infection or other concerning signs are noted. Solaraze Counseling:  I discussed with the patient the risks of Solaraze including but not limited to erythema, scaling, itching, weeping, crusting, and pain. Tetracycline Counseling: Patient counseled regarding possible photosensitivity and increased risk for sunburn.  Patient instructed to avoid sunlight, if possible.  When exposed to sunlight, patients should wear protective clothing, sunglasses, and sunscreen.  The patient was instructed to call the office immediately if the following severe adverse effects occur:  hearing changes, easy bruising/bleeding, severe headache, or vision changes.  The patient verbalized understanding of the proper use and possible adverse effects of tetracycline.  All of the patient's questions and concerns were addressed. Patient understands to avoid pregnancy while on therapy due to potential birth defects. Griseofulvin Pregnancy And Lactation Text: This medication is Pregnancy Category X and is known to cause serious birth defects. It is unknown if this medication is excreted in breast milk but breast feeding should be avoided. Cyclophosphamide Pregnancy And Lactation Text: This medication is Pregnancy Category D and it isn't considered safe during pregnancy. This medication is excreted in breast milk. Fluconazole Pregnancy And Lactation Text: This medication is Pregnancy Category C and it isn't know if it is safe during pregnancy. It is also excreted in breast milk. Erivedge Counseling- I discussed with the patient the risks of Erivedge including but not limited to nausea, vomiting, diarrhea, constipation, weight loss, changes in the sense of taste, decreased appetite, muscle spasms, and hair loss.  The patient verbalized understanding of the proper use and possible adverse effects of Erivedge.  All of the patient's questions and concerns were addressed. Dupixent Pregnancy And Lactation Text: This medication likely crosses the placenta but the risk for the fetus is uncertain. This medication is excreted in breast milk. Hydroxychloroquine Pregnancy And Lactation Text: This medication has been shown to cause fetal harm but it isn't assigned a Pregnancy Risk Category. There are small amounts excreted in breast milk. Siliq Counseling:  I discussed with the patient the risks of Siliq including but not limited to new or worsening depression, suicidal thoughts and behavior, immunosuppression, malignancy, posterior leukoencephalopathy syndrome, and serious infections.  The patient understands that monitoring is required including a PPD at baseline and must alert us or the primary physician if symptoms of infection or other concerning signs are noted. There is also a special program designed to monitor depression which is required with Siliq. Hydroxyzine Pregnancy And Lactation Text: This medication is not safe during pregnancy and should not be taken. It is also excreted in breast milk and breast feeding isn't recommended. Detail Level: Detailed Cimzia Counseling:  I discussed with the patient the risks of Cimzia including but not limited to immunosuppression, allergic reactions and infections.  The patient understands that monitoring is required including a PPD at baseline and must alert us or the primary physician if symptoms of infection or other concerning signs are noted. Topical Clindamycin Counseling: Patient counseled that this medication may cause skin irritation or allergic reactions.  In the event of skin irritation, the patient was advised to reduce the amount of the drug applied or use it less frequently.   The patient verbalized understanding of the proper use and possible adverse effects of clindamycin.  All of the patient's questions and concerns were addressed. Humira Pregnancy And Lactation Text: This medication is Pregnancy Category B and is considered safe during pregnancy. It is unknown if this medication is excreted in breast milk. Cyclosporine Pregnancy And Lactation Text: This medication is Pregnancy Category C and it isn't know if it is safe during pregnancy. This medication is excreted in breast milk. Birth Control Pills Counseling: Birth Control Pill Counseling: I discussed with the patient the potential side effects of OCPs including but not limited to increased risk of stroke, heart attack, thrombophlebitis, deep venous thrombosis, hepatic adenomas, breast changes, GI upset, headaches, and depression.  The patient verbalized understanding of the proper use and possible adverse effects of OCPs. All of the patient's questions and concerns were addressed. Birth Control Pills Pregnancy And Lactation Text: This medication should be avoided if pregnant and for the first 30 days post-partum. Azithromycin Counseling:  I discussed with the patient the risks of azithromycin including but not limited to GI upset, allergic reaction, drug rash, diarrhea, and yeast infections. Protopic Counseling: Patient may experience a mild burning sensation during topical application. Protopic is not approved in children less than 2 years of age. There have been case reports of hematologic and skin malignancies in patients using topical calcineurin inhibitors although causality is questionable. Nsaids Counseling: NSAID Counseling: I discussed with the patient that NSAIDs should be taken with food. Prolonged use of NSAIDs can result in the development of stomach ulcers.  Patient advised to stop taking NSAIDs if abdominal pain occurs.  The patient verbalized understanding of the proper use and possible adverse effects of NSAIDs.  All of the patient's questions and concerns were addressed. Bexarotene Counseling:  I discussed with the patient the risks of bexarotene including but not limited to hair loss, dry lips/skin/eyes, liver abnormalities, hyperlipidemia, pancreatitis, depression/suicidal ideation, photosensitivity, drug rash/allergic reactions, hypothyroidism, anemia, leukopenia, infection, cataracts, and teratogenicity.  Patient understands that they will need regular blood tests to check lipid profile, liver function tests, white blood cell count, thyroid function tests and pregnancy test if applicable. Erythromycin Counseling:  I discussed with the patient the risks of erythromycin including but not limited to GI upset, allergic reaction, drug rash, diarrhea, increase in liver enzymes, and yeast infections. Oxybutynin Counseling:  I discussed with the patient the risks of oxybutynin including but not limited to skin rash, drowsiness, dry mouth, difficulty urinating, and blurred vision. Ketoconazole Counseling:   Patient counseled regarding improving absorption with orange juice.  Adverse effects include but are not limited to breast enlargement, headache, diarrhea, nausea, upset stomach, liver function test abnormalities, taste disturbance, and stomach pain.  There is a rare possibility of liver failure that can occur when taking ketoconazole. The patient understands that monitoring of LFTs may be required, especially at baseline. The patient verbalized understanding of the proper use and possible adverse effects of ketoconazole.  All of the patient's questions and concerns were addressed. Fluconazole Counseling:  Patient counseled regarding adverse effects of fluconazole including but not limited to headache, diarrhea, nausea, upset stomach, liver function test abnormalities, taste disturbance, and stomach pain.  There is a rare possibility of liver failure that can occur when taking fluconazole.  The patient understands that monitoring of LFTs and kidney function test may be required, especially at baseline. The patient verbalized understanding of the proper use and possible adverse effects of fluconazole.  All of the patient's questions and concerns were addressed. Spironolactone Counseling: Patient advised regarding risks of diarrhea, abdominal pain, hyperkalemia, birth defects (for female patients), liver toxicity and renal toxicity. The patient may need blood work to monitor liver and kidney function and potassium levels while on therapy. The patient verbalized understanding of the proper use and possible adverse effects of spironolactone.  All of the patient's questions and concerns were addressed. Protopic Pregnancy And Lactation Text: This medication is Pregnancy Category C. It is unknown if this medication is excreted in breast milk when applied topically. Bexarotene Pregnancy And Lactation Text: This medication is Pregnancy Category X and should not be given to women who are pregnant or may become pregnant. This medication should not be used if you are breast feeding. Doxepin Counseling:  Patient advised that the medication is sedating and not to drive a car after taking this medication. Patient informed of potential adverse effects including but not limited to dry mouth, urinary retention, and blurry vision.  The patient verbalized understanding of the proper use and possible adverse effects of doxepin.  All of the patient's questions and concerns were addressed. Rifampin Counseling: I discussed with the patient the risks of rifampin including but not limited to liver damage, kidney damage, red-orange body fluids, nausea/vomiting and severe allergy. Ilumya Counseling: I discussed with the patient the risks of tildrakizumab including but not limited to immunosuppression, malignancy, posterior leukoencephalopathy syndrome, and serious infections.  The patient understands that monitoring is required including a PPD at baseline and must alert us or the primary physician if symptoms of infection or other concerning signs are noted. Carac Counseling:  I discussed with the patient the risks of Carac including but not limited to erythema, scaling, itching, weeping, crusting, and pain. Tazorac Counseling:  Patient advised that medication is irritating and drying.  Patient may need to apply sparingly and wash off after an hour before eventually leaving it on overnight.  The patient verbalized understanding of the proper use and possible adverse effects of tazorac.  All of the patient's questions and concerns were addressed. Ivermectin Counseling:  Patient instructed to take medication on an empty stomach with a full glass of water.  Patient informed of potential adverse effects including but not limited to nausea, diarrhea, dizziness, itching, and swelling of the extremities or lymph nodes.  The patient verbalized understanding of the proper use and possible adverse effects of ivermectin.  All of the patient's questions and concerns were addressed. 5-Fu Counseling: 5-Fluorouracil Counseling:  I discussed with the patient the risks of 5-fluorouracil including but not limited to erythema, scaling, itching, weeping, crusting, and pain. Metronidazole Counseling:  I discussed with the patient the risks of metronidazole including but not limited to seizures, nausea/vomiting, a metallic taste in the mouth, nausea/vomiting and severe allergy. Terbinafine Counseling: Patient counseling regarding adverse effects of terbinafine including but not limited to headache, diarrhea, rash, upset stomach, liver function test abnormalities, itching, taste/smell disturbance, nausea, abdominal pain, and flatulence.  There is a rare possibility of liver failure that can occur when taking terbinafine.  The patient understands that a baseline LFT and kidney function test may be required. The patient verbalized understanding of the proper use and possible adverse effects of terbinafine.  All of the patient's questions and concerns were addressed. SSKI Counseling:  I discussed with the patient the risks of SSKI including but not limited to thyroid abnormalities, metallic taste, GI upset, fever, headache, acne, arthralgias, paraesthesias, lymphadenopathy, easy bleeding, arrhythmias, and allergic reaction. Xeljanz Counseling: I discussed with the patient the risks of Xeljanz therapy including increased risk of infection, liver issues, headache, diarrhea, or cold symptoms. Live vaccines should be avoided. They were instructed to call if they have any problems. Hydroxychloroquine Counseling:  I discussed with the patient that a baseline ophthalmologic exam is needed at the start of therapy and every year thereafter while on therapy. A CBC may also be warranted for monitoring.  The side effects of this medication were discussed with the patient, including but not limited to agranulocytosis, aplastic anemia, seizures, rashes, retinopathy, and liver toxicity. Patient instructed to call the office should any adverse effect occur.  The patient verbalized understanding of the proper use and possible adverse effects of Plaquenil.  All the patient's questions and concerns were addressed. Azithromycin Pregnancy And Lactation Text: This medication is considered safe during pregnancy and is also secreted in breast milk. Clindamycin Counseling: I counseled the patient regarding use of clindamycin as an antibiotic for prophylactic and/or therapeutic purposes. Clindamycin is active against numerous classes of bacteria, including skin bacteria. Side effects may include nausea, diarrhea, gastrointestinal upset, rash, hives, yeast infections, and in rare cases, colitis. Cellcept Counseling:  I discussed with the patient the risks of mycophenolate mofetil including but not limited to infection/immunosuppression, GI upset, hypokalemia, hypercholesterolemia, bone marrow suppression, lymphoproliferative disorders, malignancy, GI ulceration/bleed/perforation, colitis, interstitial lung disease, kidney failure, progressive multifocal leukoencephalopathy, and birth defects.  The patient understands that monitoring is required including a baseline creatinine and regular CBC testing. In addition, patient must alert us immediately if symptoms of infection or other concerning signs are noted. Dapsone Counseling: I discussed with the patient the risks of dapsone including but not limited to hemolytic anemia, agranulocytosis, rashes, methemoglobinemia, kidney failure, peripheral neuropathy, headaches, GI upset, and liver toxicity.  Patients who start dapsone require monitoring including baseline LFTs and weekly CBCs for the first month, then every month thereafter.  The patient verbalized understanding of the proper use and possible adverse effects of dapsone.  All of the patient's questions and concerns were addressed. Quinolones Counseling:  I discussed with the patient the risks of fluoroquinolones including but not limited to GI upset, allergic reaction, drug rash, diarrhea, dizziness, photosensitivity, yeast infections, liver function test abnormalities, tendonitis/tendon rupture. Arava Counseling:  Patient counseled regarding adverse effects of Arava including but not limited to nausea, vomiting, abnormalities in liver function tests. Patients may develop mouth sores, rash, diarrhea, and abnormalities in blood counts. The patient understands that monitoring is required including LFTs and blood counts.  There is a rare possibility of scarring of the liver and lung problems that can occur when taking methotrexate. Persistent nausea, loss of appetite, pale stools, dark urine, cough, and shortness of breath should be reported immediately. Patient advised to discontinue Arava treatment and consult with a physician prior to attempting conception. The patient will have to undergo a treatment to eliminate Arava from the body prior to conception. Benzoyl Peroxide Pregnancy And Lactation Text: This medication is Pregnancy Category C. It is unknown if benzoyl peroxide is excreted in breast milk. Isotretinoin Counseling: Patient should get monthly blood tests, not donate blood, not drive at night if vision affected, not share medication, and not undergo elective surgery for 6 months after tx completed. Side effects reviewed, pt to contact office should one occur. Stelara Counseling:  I discussed with the patient the risks of ustekinumab including but not limited to immunosuppression, malignancy, posterior leukoencephalopathy syndrome, and serious infections.  The patient understands that monitoring is required including a PPD at baseline and must alert us or the primary physician if symptoms of infection or other concerning signs are noted. Xellashondaz Pregnancy And Lactation Text: This medication is Pregnancy Category D and is not considered safe during pregnancy.  The risk during breast feeding is also uncertain. Clofazimine Counseling:  I discussed with the patient the risks of clofazimine including but not limited to skin and eye pigmentation, liver damage, nausea/vomiting, gastrointestinal bleeding and allergy. Tazorac Pregnancy And Lactation Text: This medication is not safe during pregnancy. It is unknown if this medication is excreted in breast milk. Bactrim Counseling:  I discussed with the patient the risks of sulfa antibiotics including but not limited to GI upset, allergic reaction, drug rash, diarrhea, dizziness, photosensitivity, and yeast infections.  Rarely, more serious reactions can occur including but not limited to aplastic anemia, agranulocytosis, methemoglobinemia, blood dyscrasias, liver or kidney failure, lung infiltrates or desquamative/blistering drug rashes. Erythromycin Pregnancy And Lactation Text: This medication is Pregnancy Category B and is considered safe during pregnancy. It is also excreted in breast milk. Glycopyrrolate Counseling:  I discussed with the patient the risks of glycopyrrolate including but not limited to skin rash, drowsiness, dry mouth, difficulty urinating, and blurred vision. Otezla Counseling: The side effects of Otezla were discussed with the patient, including but not limited to worsening or new depression, weight loss, diarrhea, nausea, upper respiratory tract infection, and headache. Patient instructed to call the office should any adverse effect occur.  The patient verbalized understanding of the proper use and possible adverse effects of Otezla.  All the patient's questions and concerns were addressed. Minocycline Counseling: Patient advised regarding possible photosensitivity and discoloration of the teeth, skin, lips, tongue and gums.  Patient instructed to avoid sunlight, if possible.  When exposed to sunlight, patients should wear protective clothing, sunglasses, and sunscreen.  The patient was instructed to call the office immediately if the following severe adverse effects occur:  hearing changes, easy bruising/bleeding, severe headache, or vision changes.  The patient verbalized understanding of the proper use and possible adverse effects of minocycline.  All of the patient's questions and concerns were addressed.

## 2023-10-12 LAB
ALBUMIN SERPL ELPH-MCNC: 3.2 G/DL — LOW (ref 3.3–5)
ALP SERPL-CCNC: 52 U/L — SIGNIFICANT CHANGE UP (ref 40–120)
ALT FLD-CCNC: 25 U/L — SIGNIFICANT CHANGE UP (ref 12–78)
AMPHET UR-MCNC: NEGATIVE — SIGNIFICANT CHANGE UP
ANION GAP SERPL CALC-SCNC: 5 MMOL/L — SIGNIFICANT CHANGE UP (ref 5–17)
AST SERPL-CCNC: 32 U/L — SIGNIFICANT CHANGE UP (ref 15–37)
BARBITURATES UR SCN-MCNC: NEGATIVE — SIGNIFICANT CHANGE UP
BENZODIAZ UR-MCNC: POSITIVE — SIGNIFICANT CHANGE UP
BILIRUB SERPL-MCNC: 1.1 MG/DL — SIGNIFICANT CHANGE UP (ref 0.2–1.2)
BUN SERPL-MCNC: 11 MG/DL — SIGNIFICANT CHANGE UP (ref 7–23)
CALCIUM SERPL-MCNC: 8.1 MG/DL — LOW (ref 8.5–10.1)
CHLORIDE SERPL-SCNC: 112 MMOL/L — HIGH (ref 96–108)
CK SERPL-CCNC: 1074 U/L — HIGH (ref 26–308)
CO2 SERPL-SCNC: 24 MMOL/L — SIGNIFICANT CHANGE UP (ref 22–31)
COCAINE METAB.OTHER UR-MCNC: NEGATIVE — SIGNIFICANT CHANGE UP
CREAT SERPL-MCNC: 1 MG/DL — SIGNIFICANT CHANGE UP (ref 0.5–1.3)
EGFR: 104 ML/MIN/1.73M2 — SIGNIFICANT CHANGE UP
GLUCOSE SERPL-MCNC: 91 MG/DL — SIGNIFICANT CHANGE UP (ref 70–99)
HCT VFR BLD CALC: 36.1 % — LOW (ref 39–50)
HGB BLD-MCNC: 11.7 G/DL — LOW (ref 13–17)
MAGNESIUM SERPL-MCNC: 2.5 MG/DL — SIGNIFICANT CHANGE UP (ref 1.6–2.6)
MCHC RBC-ENTMCNC: 23.3 PG — LOW (ref 27–34)
MCHC RBC-ENTMCNC: 32.4 G/DL — SIGNIFICANT CHANGE UP (ref 32–36)
MCV RBC AUTO: 71.9 FL — LOW (ref 80–100)
METHADONE UR-MCNC: NEGATIVE — SIGNIFICANT CHANGE UP
NRBC # BLD: 0 /100 WBCS — SIGNIFICANT CHANGE UP (ref 0–0)
OPIATES UR-MCNC: NEGATIVE — SIGNIFICANT CHANGE UP
PCP SPEC-MCNC: SIGNIFICANT CHANGE UP
PCP UR-MCNC: NEGATIVE — SIGNIFICANT CHANGE UP
PHOSPHATE SERPL-MCNC: 3.1 MG/DL — SIGNIFICANT CHANGE UP (ref 2.5–4.5)
PLATELET # BLD AUTO: 187 K/UL — SIGNIFICANT CHANGE UP (ref 150–400)
POTASSIUM SERPL-MCNC: 3.9 MMOL/L — SIGNIFICANT CHANGE UP (ref 3.5–5.3)
POTASSIUM SERPL-SCNC: 3.9 MMOL/L — SIGNIFICANT CHANGE UP (ref 3.5–5.3)
PROT SERPL-MCNC: 6.3 GM/DL — SIGNIFICANT CHANGE UP (ref 6–8.3)
RBC # BLD: 5.02 M/UL — SIGNIFICANT CHANGE UP (ref 4.2–5.8)
RBC # FLD: 16.6 % — HIGH (ref 10.3–14.5)
SODIUM SERPL-SCNC: 141 MMOL/L — SIGNIFICANT CHANGE UP (ref 135–145)
THC UR QL: POSITIVE — SIGNIFICANT CHANGE UP
WBC # BLD: 10.71 K/UL — HIGH (ref 3.8–10.5)
WBC # FLD AUTO: 10.71 K/UL — HIGH (ref 3.8–10.5)

## 2023-10-12 PROCEDURE — 99291 CRITICAL CARE FIRST HOUR: CPT

## 2023-10-12 PROCEDURE — 90792 PSYCH DIAG EVAL W/MED SRVCS: CPT

## 2023-10-12 RX ORDER — DEXMEDETOMIDINE HYDROCHLORIDE IN 0.9% SODIUM CHLORIDE 4 UG/ML
0.3 INJECTION INTRAVENOUS
Qty: 200 | Refills: 0 | Status: DISCONTINUED | OUTPATIENT
Start: 2023-10-12 | End: 2023-10-12

## 2023-10-12 RX ORDER — MIDAZOLAM HYDROCHLORIDE 1 MG/ML
4 INJECTION, SOLUTION INTRAMUSCULAR; INTRAVENOUS ONCE
Refills: 0 | Status: DISCONTINUED | OUTPATIENT
Start: 2023-10-12 | End: 2023-10-12

## 2023-10-12 RX ORDER — HALOPERIDOL DECANOATE 100 MG/ML
5 INJECTION INTRAMUSCULAR ONCE
Refills: 0 | Status: DISCONTINUED | OUTPATIENT
Start: 2023-10-12 | End: 2023-10-12

## 2023-10-12 RX ORDER — MIDAZOLAM HYDROCHLORIDE 1 MG/ML
2 INJECTION, SOLUTION INTRAMUSCULAR; INTRAVENOUS EVERY 4 HOURS
Refills: 0 | Status: DISCONTINUED | OUTPATIENT
Start: 2023-10-12 | End: 2023-10-16

## 2023-10-12 RX ORDER — HALOPERIDOL DECANOATE 100 MG/ML
5 INJECTION INTRAMUSCULAR ONCE
Refills: 0 | Status: COMPLETED | OUTPATIENT
Start: 2023-10-12 | End: 2023-10-12

## 2023-10-12 RX ORDER — DEXMEDETOMIDINE HYDROCHLORIDE IN 0.9% SODIUM CHLORIDE 4 UG/ML
1.5 INJECTION INTRAVENOUS
Qty: 200 | Refills: 0 | Status: DISCONTINUED | OUTPATIENT
Start: 2023-10-12 | End: 2023-10-13

## 2023-10-12 RX ORDER — HALOPERIDOL DECANOATE 100 MG/ML
5 INJECTION INTRAMUSCULAR EVERY 6 HOURS
Refills: 0 | Status: DISCONTINUED | OUTPATIENT
Start: 2023-10-12 | End: 2023-10-16

## 2023-10-12 RX ORDER — CHLORHEXIDINE GLUCONATE 213 G/1000ML
15 SOLUTION TOPICAL EVERY 12 HOURS
Refills: 0 | Status: DISCONTINUED | OUTPATIENT
Start: 2023-10-12 | End: 2023-10-12

## 2023-10-12 RX ORDER — PROPOFOL 10 MG/ML
90 INJECTION, EMULSION INTRAVENOUS ONCE
Refills: 0 | Status: COMPLETED | OUTPATIENT
Start: 2023-10-12 | End: 2023-10-12

## 2023-10-12 RX ADMIN — PROPOFOL 4.8 MICROGRAM(S)/KG/MIN: 10 INJECTION, EMULSION INTRAVENOUS at 06:49

## 2023-10-12 RX ADMIN — DEXMEDETOMIDINE HYDROCHLORIDE IN 0.9% SODIUM CHLORIDE 30.6 MICROGRAM(S)/KG/HR: 4 INJECTION INTRAVENOUS at 19:32

## 2023-10-12 RX ADMIN — DEXMEDETOMIDINE HYDROCHLORIDE IN 0.9% SODIUM CHLORIDE 30.6 MICROGRAM(S)/KG/HR: 4 INJECTION INTRAVENOUS at 21:00

## 2023-10-12 RX ADMIN — MIDAZOLAM HYDROCHLORIDE 2 MILLIGRAM(S): 1 INJECTION, SOLUTION INTRAMUSCULAR; INTRAVENOUS at 20:59

## 2023-10-12 RX ADMIN — PROPOFOL 4.8 MICROGRAM(S)/KG/MIN: 10 INJECTION, EMULSION INTRAVENOUS at 00:17

## 2023-10-12 RX ADMIN — HEPARIN SODIUM 5000 UNIT(S): 5000 INJECTION INTRAVENOUS; SUBCUTANEOUS at 22:57

## 2023-10-12 RX ADMIN — CHLORHEXIDINE GLUCONATE 15 MILLILITER(S): 213 SOLUTION TOPICAL at 05:13

## 2023-10-12 RX ADMIN — DEXMEDETOMIDINE HYDROCHLORIDE IN 0.9% SODIUM CHLORIDE 6.12 MICROGRAM(S)/KG/HR: 4 INJECTION INTRAVENOUS at 12:41

## 2023-10-12 RX ADMIN — CHLORHEXIDINE GLUCONATE 1 APPLICATION(S): 213 SOLUTION TOPICAL at 08:12

## 2023-10-12 RX ADMIN — PROPOFOL 4.8 MICROGRAM(S)/KG/MIN: 10 INJECTION, EMULSION INTRAVENOUS at 03:32

## 2023-10-12 RX ADMIN — PROPOFOL 90 MILLIGRAM(S): 10 INJECTION, EMULSION INTRAVENOUS at 12:48

## 2023-10-12 RX ADMIN — PROPOFOL 4.8 MICROGRAM(S)/KG/MIN: 10 INJECTION, EMULSION INTRAVENOUS at 07:15

## 2023-10-12 RX ADMIN — HEPARIN SODIUM 5000 UNIT(S): 5000 INJECTION INTRAVENOUS; SUBCUTANEOUS at 13:32

## 2023-10-12 RX ADMIN — MIDAZOLAM HYDROCHLORIDE 4 MILLIGRAM(S): 1 INJECTION, SOLUTION INTRAMUSCULAR; INTRAVENOUS at 13:30

## 2023-10-12 RX ADMIN — PROPOFOL 4.8 MICROGRAM(S)/KG/MIN: 10 INJECTION, EMULSION INTRAVENOUS at 12:43

## 2023-10-12 RX ADMIN — HALOPERIDOL DECANOATE 5 MILLIGRAM(S): 100 INJECTION INTRAMUSCULAR at 12:45

## 2023-10-12 RX ADMIN — HEPARIN SODIUM 5000 UNIT(S): 5000 INJECTION INTRAVENOUS; SUBCUTANEOUS at 05:13

## 2023-10-12 RX ADMIN — DEXMEDETOMIDINE HYDROCHLORIDE IN 0.9% SODIUM CHLORIDE 30.6 MICROGRAM(S)/KG/HR: 4 INJECTION INTRAVENOUS at 22:56

## 2023-10-12 RX ADMIN — DEXMEDETOMIDINE HYDROCHLORIDE IN 0.9% SODIUM CHLORIDE 30.6 MICROGRAM(S)/KG/HR: 4 INJECTION INTRAVENOUS at 15:09

## 2023-10-12 RX ADMIN — HALOPERIDOL DECANOATE 5 MILLIGRAM(S): 100 INJECTION INTRAMUSCULAR at 21:00

## 2023-10-12 RX ADMIN — DEXMEDETOMIDINE HYDROCHLORIDE IN 0.9% SODIUM CHLORIDE 30.6 MICROGRAM(S)/KG/HR: 4 INJECTION INTRAVENOUS at 17:50

## 2023-10-12 RX ADMIN — PANTOPRAZOLE SODIUM 40 MILLIGRAM(S): 20 TABLET, DELAYED RELEASE ORAL at 13:31

## 2023-10-12 RX ADMIN — DEXMEDETOMIDINE HYDROCHLORIDE IN 0.9% SODIUM CHLORIDE 6.12 MICROGRAM(S)/KG/HR: 4 INJECTION INTRAVENOUS at 13:42

## 2023-10-12 NOTE — PROGRESS NOTE ADULT - SUBJECTIVE AND OBJECTIVE BOX
Patient is a 28y old  Male who presents with a chief complaint of Severe agitation requiring intubation for patient and staff safety, Psychosis (12 Oct 2023 20:26)      BRIEF HOSPITAL COURSE:   27 yo m pmhx bipolar disorder s/p prior suicide attempt and self harm requiring ~1 year inpatient stay admitted with severe agitation requiring intubation.      10/12: extubated this afternoon, episode of severe agitation requiring 7 people to hold patient down, ivp propofol, versed and haldol as well as 4 point restraints.     Events last 24 hours:   Received patient on max dose precedex, patient with episode of severe agitation this evening.  patient began thrashing in bed, then started hitting his head on the side rail, trying to rip through restraints.  Patient received versed and haldol with improvement in agitation, endorsed that he woke up from having a bad dream.        PAST MEDICAL & SURGICAL HISTORY:    Allergies  Allergy Status Unknown      FAMILY HISTORY:  unknown      Social History:   from home      Review of Systems:  unable to obtain 2/2 extreme periods of agitation/sedation    Physical Examination:    General: adult male lying in bed    HEENT: nc/at    PULM: symmetrical thorax expansion upon respiration    CVS: rrr    ABD: Soft, nondistended, nontender, +bs    EXT: No edema, nontender    SKIN: Warm     NEURO: agitated      Medications:  dexMEDEtomidine Infusion 1.5 MICROgram(s)/kG/Hr IV Continuous <Continuous>  haloperidol    Injectable 5 milliGRAM(s) IntraMuscular every 6 hours PRN  midazolam Injectable 2 milliGRAM(s) IV Push every 4 hours PRN  heparin   Injectable 5000 Unit(s) SubCutaneous every 8 hours  pantoprazole  Injectable 40 milliGRAM(s) IV Push daily  influenza   Vaccine 0.5 milliLiter(s) IntraMuscular once  chlorhexidine 2% Cloths 1 Application(s) Topical <User Schedule>      Mode: CPAP with PS  FiO2: 40  PEEP: 5  PS: 5  ITime: 1  MAP: 7  PIP: 15      ICU Vital Signs Last 24 Hrs  T(C): 37.8 (13 Oct 2023 00:00), Max: 38 (12 Oct 2023 06:30)  T(F): 100 (13 Oct 2023 00:00), Max: 100.4 (12 Oct 2023 06:30)  HR: 69 (13 Oct 2023 01:00) (44 - 112)  BP: 150/111 (13 Oct 2023 01:00) (98/62 - 166/97)  BP(mean): 122 (13 Oct 2023 01:00) (74 - 122)  ABP: --  ABP(mean): --  RR: 18 (13 Oct 2023 01:00) (10 - 26)  SpO2: 97% (13 Oct 2023 01:00) (94% - 100%)    O2 Parameters below as of 12 Oct 2023 12:01  Patient On (Oxygen Delivery Method): nasal cannula  O2 Flow (L/min): 3      Vital Signs Last 24 Hrs  T(C): 37.8 (13 Oct 2023 00:00), Max: 38 (12 Oct 2023 06:30)  T(F): 100 (13 Oct 2023 00:00), Max: 100.4 (12 Oct 2023 06:30)  HR: 69 (13 Oct 2023 01:00) (44 - 112)  BP: 150/111 (13 Oct 2023 01:00) (98/62 - 166/97)  BP(mean): 122 (13 Oct 2023 01:00) (74 - 122)  RR: 18 (13 Oct 2023 01:00) (10 - 26)  SpO2: 97% (13 Oct 2023 01:00) (94% - 100%)    Parameters below as of 12 Oct 2023 12:01  Patient On (Oxygen Delivery Method): nasal cannula  O2 Flow (L/min): 3      ABG - ( 11 Oct 2023 17:27 )  pH, Arterial: 7.41  pH, Blood: x     /  pCO2: 39    /  pO2: 110   / HCO3: 25    / Base Excess: 0.1   /  SaO2: 99.0        I&O's Detail    11 Oct 2023 07:01  -  12 Oct 2023 07:00  --------------------------------------------------------  IN:    FentaNYL: 73.4 mL    IV PiggyBack: 100 mL    Midazolam: 20.4 mL    Propofol: 263.7 mL  Total IN: 457.5 mL    OUT:    Voided (mL): 1700 mL  Total OUT: 1700 mL  Total NET: -1242.5 mL      12 Oct 2023 07:01  -  13 Oct 2023 01:19  --------------------------------------------------------  IN:    Dexmedetomidine: 289 mL    Propofol: 70.6 mL  Total IN: 359.6 mL    OUT:    Indwelling Catheter - Urethral (mL): 2180 mL  Total OUT: 2180 mL  Total NET: -1820.4 mL      LABS:                        11.7   10.71 )-----------( 187      ( 12 Oct 2023 02:00 )             36.1     10-12    141  |  112<H>  |  11  ----------------------------<  91  3.9   |  24  |  1.00    Ca    8.1<L>      12 Oct 2023 02:00  Phos  3.1     10-12  Mg     2.5     10-12    TPro  6.3  /  Alb  3.2<L>  /  TBili  1.1  /  DBili  x   /  AST  32  /  ALT  25  /  AlkPhos  52  10-12      CARDIAC MARKERS ( 12 Oct 2023 02:00 )  x     / x     / 1074 U/L / x     / x      CARDIAC MARKERS ( 11 Oct 2023 16:36 )  x     / x     / 1043 U/L / x     / x          Urinalysis Basic - ( 12 Oct 2023 02:00 )  Color: x / Appearance: x / SG: x / pH: x  Gluc: 91 mg/dL / Ketone: x  / Bili: x / Urobili: x   Blood: x / Protein: x / Nitrite: x   Leuk Esterase: x / RBC: x / WBC x   Sq Epi: x / Non Sq Epi: x / Bacteria: x      RADIOLOGY:  < from: CT Head No Cont (10.11.23 @ 18:24) >    ACC: [id number] EXAM:  CT CERVICAL SPINE   ORDERED BY: DERIAN MADRIGAL     ACC: [id number] EXAM:  CT BRAIN   ORDERED BY: DERIAN MADRIGAL     PROCEDURE DATE:  10/11/2023      INTERPRETATION:  CT head without IV contrast    CLINICAL INFORMATION: Intracranial hemorrhage.    TECHNIQUE: Contiguous axial 5 mm sections were obtained through the head.   Sagittal and coronal 2-D reformatted images were also obtained.   This   scan was performed using automatic exposure control (radiation dose   reduction software) to obtain a diagnostic image quality scan with   patient dose as low as reasonably achievable.    FINDINGS:   No previous examinations are available for review.    The brain demonstrates no abnormal attenuation.   No acute cerebral   cortical infarct is seen.  No intracranial hemorrhage is found.  No mass   effect is found in the brain.    The ventricles, sulci and basal cisterns appear unremarkable.    The orbits are unremarkable.  The paranasal sinuses are significant for   mild mucosal thickening in the BILATERAL ethmoid sinuses.  The nasal   cavity and nasopharynx contains abundant secretions.  The central skull   base, petrous temporal bones and calvarium remain intact.    CT cervical spine without IV contrast    CLINICAL INFORMATION: Fracture, trauma, neck pain. trauma, ams, agitation    TECHNIQUE:  Contiguous axial 2.0 mm sections were obtained through the   cervical spine using a single helical acquisition.  Additional 2 mm   sagittal and coronal reformatted reconstructions of the spine were   obtained.  These additional reformatted images were used to evaluate the   spine for alignment, vertebral fractures and the integrity of the the   posterior elements.   This scan was performed using automatic exposure   control (radiation dose reduction software) to obtain a diagnostic image   quality scan with patient dose as low as reasonably achievable.    FINDINGS:   No prior similar studies are available for review    Cervical vertebral body heights are maintained. Novertebral fracture is   seen. No destructive bone lesion is found.  Alignment is preserved.    Facet joints appear intact and aligned.    Cervical intervertebral disc spaces show disc degeneration and   spondylosis at C2-3 through C5-6 with mild lossof disc height. Small   LEFT paramedian disc herniation noted at C2-3 which indents the ventral   cord. Narrowing of the BILATERAL C3-4, C4-5 neural foramina due to   uncovertebral spurring and facet osteophytic hypertrophy.    The skull base appearsintact.  No neck mass is recognized.  Paraspinal   soft tissues appear intact. Visualized lymph nodes appear to be within   physiologic size limits.      IMPRESSION:    CT HEAD: No acute abnormality.    Abundant secretions in the nasal cavity   andnasopharynx.    CT cervical spine:   No vertebral fracture is recognized.  Disc   degeneration and spondylosis at C2-3 through C5-6 with mild loss of disc   height. Small LEFT paramedian disc herniation noted at C2-3 which indents   the ventral cord.Narrowing of the BILATERAL C3-4, C4-5 neural foramina   due to uncovertebral spurring and facet osteophytic hypertrophy.    --- End of Report ---    ZINA GONZALEZ MD; Attending Radiologist  This document has been electronically signed. Oct 11 2023  6:45PM    < end of copied text >   Patient is a 28y old  Male who presents with a chief complaint of Severe agitation requiring intubation for patient and staff safety, Psychosis (12 Oct 2023 20:26)      BRIEF HOSPITAL COURSE:   29 yo m pmhx bipolar disorder s/p prior suicide attempt and self harm requiring ~1 year inpatient stay admitted with severe agitation requiring intubation.      10/12: extubated this afternoon, episode of severe agitation requiring 7 people to hold patient down, ivp propofol, versed and haldol as well as 4 point restraints.     Events last 24 hours:   Received patient on max dose precedex, patient with episode of severe agitation this evening.  patient began thrashing in bed, then started hitting his head on the side rail, trying to rip through restraints.  Patient received versed and haldol with improvement in agitation, endorsed that he woke up from having a bad dream.        PAST MEDICAL & SURGICAL HISTORY:    Allergies  Allergy Status Unknown      FAMILY HISTORY:  unknown      Social History:   from home      Review of Systems:  unable to obtain 2/2 extreme periods of agitation/sedation    Physical Examination:    General: adult male lying in bed    HEENT: nc/at    PULM: symmetrical thorax expansion upon respiration    CVS: rrr    ABD: Soft, nondistended, nontender, +bs    EXT: No edema, nontender    SKIN: Warm     NEURO: agitated      Medications:  dexMEDEtomidine Infusion 1.5 MICROgram(s)/kG/Hr IV Continuous <Continuous>  haloperidol    Injectable 5 milliGRAM(s) IntraMuscular every 6 hours PRN  midazolam Injectable 2 milliGRAM(s) IV Push every 4 hours PRN  heparin   Injectable 5000 Unit(s) SubCutaneous every 8 hours  pantoprazole  Injectable 40 milliGRAM(s) IV Push daily  influenza   Vaccine 0.5 milliLiter(s) IntraMuscular once  chlorhexidine 2% Cloths 1 Application(s) Topical <User Schedule>      Mode: CPAP with PS  FiO2: 40  PEEP: 5  PS: 5  ITime: 1  MAP: 7  PIP: 15      ICU Vital Signs Last 24 Hrs  T(C): 37.8 (13 Oct 2023 00:00), Max: 38 (12 Oct 2023 06:30)  T(F): 100 (13 Oct 2023 00:00), Max: 100.4 (12 Oct 2023 06:30)  HR: 69 (13 Oct 2023 01:00) (44 - 112)  BP: 150/111 (13 Oct 2023 01:00) (98/62 - 166/97)  BP(mean): 122 (13 Oct 2023 01:00) (74 - 122)  ABP: --  ABP(mean): --  RR: 18 (13 Oct 2023 01:00) (10 - 26)  SpO2: 97% (13 Oct 2023 01:00) (94% - 100%)    O2 Parameters below as of 12 Oct 2023 12:01  Patient On (Oxygen Delivery Method): nasal cannula  O2 Flow (L/min): 3      Vital Signs Last 24 Hrs  T(C): 37.8 (13 Oct 2023 00:00), Max: 38 (12 Oct 2023 06:30)  T(F): 100 (13 Oct 2023 00:00), Max: 100.4 (12 Oct 2023 06:30)  HR: 69 (13 Oct 2023 01:00) (44 - 112)  BP: 150/111 (13 Oct 2023 01:00) (98/62 - 166/97)  BP(mean): 122 (13 Oct 2023 01:00) (74 - 122)  RR: 18 (13 Oct 2023 01:00) (10 - 26)  SpO2: 97% (13 Oct 2023 01:00) (94% - 100%)    Parameters below as of 12 Oct 2023 12:01  Patient On (Oxygen Delivery Method): nasal cannula  O2 Flow (L/min): 3      ABG - ( 11 Oct 2023 17:27 )  pH, Arterial: 7.41  pH, Blood: x     /  pCO2: 39    /  pO2: 110   / HCO3: 25    / Base Excess: 0.1   /  SaO2: 99.0        I&O's Detail    11 Oct 2023 07:01  -  12 Oct 2023 07:00  --------------------------------------------------------  IN:    FentaNYL: 73.4 mL    IV PiggyBack: 100 mL    Midazolam: 20.4 mL    Propofol: 263.7 mL  Total IN: 457.5 mL    OUT:    Voided (mL): 1700 mL  Total OUT: 1700 mL  Total NET: -1242.5 mL      12 Oct 2023 07:01  -  13 Oct 2023 01:19  --------------------------------------------------------  IN:    Dexmedetomidine: 289 mL    Propofol: 70.6 mL  Total IN: 359.6 mL    OUT:    Indwelling Catheter - Urethral (mL): 2180 mL  Total OUT: 2180 mL  Total NET: -1820.4 mL      LABS:                        11.7   10.71 )-----------( 187      ( 12 Oct 2023 02:00 )             36.1     10-12    141  |  112<H>  |  11  ----------------------------<  91  3.9   |  24  |  1.00    Ca    8.1<L>      12 Oct 2023 02:00  Phos  3.1     10-12  Mg     2.5     10-12    TPro  6.3  /  Alb  3.2<L>  /  TBili  1.1  /  DBili  x   /  AST  32  /  ALT  25  /  AlkPhos  52  10-12      CARDIAC MARKERS ( 12 Oct 2023 02:00 )  x     / x     / 1074 U/L / x     / x      CARDIAC MARKERS ( 11 Oct 2023 16:36 )  x     / x     / 1043 U/L / x     / x          Urinalysis Basic - ( 12 Oct 2023 02:00 )  Color: x / Appearance: x / SG: x / pH: x  Gluc: 91 mg/dL / Ketone: x  / Bili: x / Urobili: x   Blood: x / Protein: x / Nitrite: x   Leuk Esterase: x / RBC: x / WBC x   Sq Epi: x / Non Sq Epi: x / Bacteria: x      RADIOLOGY:  < from: CT Head No Cont (10.11.23 @ 18:24) >    ACC: [id number] EXAM:  CT CERVICAL SPINE   ORDERED BY: DERIAN MADRIGAL     ACC: [id number] EXAM:  CT BRAIN   ORDERED BY: DERIAN MADRIGAL     PROCEDURE DATE:  10/11/2023      INTERPRETATION:  CT head without IV contrast    CLINICAL INFORMATION: Intracranial hemorrhage.    TECHNIQUE: Contiguous axial 5 mm sections were obtained through the head.   Sagittal and coronal 2-D reformatted images were also obtained.   This   scan was performed using automatic exposure control (radiation dose   reduction software) to obtain a diagnostic image quality scan with   patient dose as low as reasonably achievable.    FINDINGS:   No previous examinations are available for review.    The brain demonstrates no abnormal attenuation.   No acute cerebral   cortical infarct is seen.  No intracranial hemorrhage is found.  No mass   effect is found in the brain.    The ventricles, sulci and basal cisterns appear unremarkable.    The orbits are unremarkable.  The paranasal sinuses are significant for   mild mucosal thickening in the BILATERAL ethmoid sinuses.  The nasal   cavity and nasopharynx contains abundant secretions.  The central skull   base, petrous temporal bones and calvarium remain intact.    CT cervical spine without IV contrast    CLINICAL INFORMATION: Fracture, trauma, neck pain. trauma, ams, agitation    TECHNIQUE:  Contiguous axial 2.0 mm sections were obtained through the   cervical spine using a single helical acquisition.  Additional 2 mm   sagittal and coronal reformatted reconstructions of the spine were   obtained.  These additional reformatted images were used to evaluate the   spine for alignment, vertebral fractures and the integrity of the the   posterior elements.   This scan was performed using automatic exposure   control (radiation dose reduction software) to obtain a diagnostic image   quality scan with patient dose as low as reasonably achievable.    FINDINGS:   No prior similar studies are available for review    Cervical vertebral body heights are maintained. Novertebral fracture is   seen. No destructive bone lesion is found.  Alignment is preserved.    Facet joints appear intact and aligned.    Cervical intervertebral disc spaces show disc degeneration and   spondylosis at C2-3 through C5-6 with mild lossof disc height. Small   LEFT paramedian disc herniation noted at C2-3 which indents the ventral   cord. Narrowing of the BILATERAL C3-4, C4-5 neural foramina due to   uncovertebral spurring and facet osteophytic hypertrophy.    The skull base appearsintact.  No neck mass is recognized.  Paraspinal   soft tissues appear intact. Visualized lymph nodes appear to be within   physiologic size limits.      IMPRESSION:    CT HEAD: No acute abnormality.    Abundant secretions in the nasal cavity   andnasopharynx.    CT cervical spine:   No vertebral fracture is recognized.  Disc   degeneration and spondylosis at C2-3 through C5-6 with mild loss of disc   height. Small LEFT paramedian disc herniation noted at C2-3 which indents   the ventral cord.Narrowing of the BILATERAL C3-4, C4-5 neural foramina   due to uncovertebral spurring and facet osteophytic hypertrophy.    --- End of Report ---    ZINA GONZALEZ MD; Attending Radiologist  This document has been electronically signed. Oct 11 2023  6:45PM    < end of copied text >   Patient is a 28y old  Male who presents with a chief complaint of Severe agitation requiring intubation for patient and staff safety, Psychosis (12 Oct 2023 20:26)      BRIEF HOSPITAL COURSE:   27 yo m pmhx bipolar disorder s/p prior suicide attempt and self harm requiring ~1 year inpatient stay admitted with severe agitation requiring intubation.      10/12: extubated this afternoon, episode of severe agitation requiring 7 people to hold patient down, ivp propofol, versed and haldol as well as 4 point restraints.     Events last 24 hours:   Received patient on max dose precedex, patient with episode of severe agitation this evening.  patient began thrashing in bed, then started hitting his head on the side rail, trying to rip through restraints.  Patient received versed and haldol with improvement in agitation, endorsed that he woke up from having a bad dream.        PAST MEDICAL & SURGICAL HISTORY:    Allergies  Allergy Status Unknown      FAMILY HISTORY:  unknown      Social History:   from home      Review of Systems:  unable to obtain 2/2 extreme periods of agitation/sedation    Physical Examination:    General: adult male lying in bed    HEENT: nc/at    PULM: symmetrical thorax expansion upon respiration    CVS: rrr    ABD: Soft, nondistended, nontender, +bs    EXT: No edema, nontender    SKIN: Warm     NEURO: agitated      Medications:  dexMEDEtomidine Infusion 1.5 MICROgram(s)/kG/Hr IV Continuous <Continuous>  haloperidol    Injectable 5 milliGRAM(s) IntraMuscular every 6 hours PRN  midazolam Injectable 2 milliGRAM(s) IV Push every 4 hours PRN  heparin   Injectable 5000 Unit(s) SubCutaneous every 8 hours  pantoprazole  Injectable 40 milliGRAM(s) IV Push daily  influenza   Vaccine 0.5 milliLiter(s) IntraMuscular once  chlorhexidine 2% Cloths 1 Application(s) Topical <User Schedule>      Mode: CPAP with PS  FiO2: 40  PEEP: 5  PS: 5  ITime: 1  MAP: 7  PIP: 15      ICU Vital Signs Last 24 Hrs  T(C): 37.8 (13 Oct 2023 00:00), Max: 38 (12 Oct 2023 06:30)  T(F): 100 (13 Oct 2023 00:00), Max: 100.4 (12 Oct 2023 06:30)  HR: 69 (13 Oct 2023 01:00) (44 - 112)  BP: 150/111 (13 Oct 2023 01:00) (98/62 - 166/97)  BP(mean): 122 (13 Oct 2023 01:00) (74 - 122)  ABP: --  ABP(mean): --  RR: 18 (13 Oct 2023 01:00) (10 - 26)  SpO2: 97% (13 Oct 2023 01:00) (94% - 100%)    O2 Parameters below as of 12 Oct 2023 12:01  Patient On (Oxygen Delivery Method): nasal cannula  O2 Flow (L/min): 3      Vital Signs Last 24 Hrs  T(C): 37.8 (13 Oct 2023 00:00), Max: 38 (12 Oct 2023 06:30)  T(F): 100 (13 Oct 2023 00:00), Max: 100.4 (12 Oct 2023 06:30)  HR: 69 (13 Oct 2023 01:00) (44 - 112)  BP: 150/111 (13 Oct 2023 01:00) (98/62 - 166/97)  BP(mean): 122 (13 Oct 2023 01:00) (74 - 122)  RR: 18 (13 Oct 2023 01:00) (10 - 26)  SpO2: 97% (13 Oct 2023 01:00) (94% - 100%)    Parameters below as of 12 Oct 2023 12:01  Patient On (Oxygen Delivery Method): nasal cannula  O2 Flow (L/min): 3      ABG - ( 11 Oct 2023 17:27 )  pH, Arterial: 7.41  pH, Blood: x     /  pCO2: 39    /  pO2: 110   / HCO3: 25    / Base Excess: 0.1   /  SaO2: 99.0        I&O's Detail    11 Oct 2023 07:01  -  12 Oct 2023 07:00  --------------------------------------------------------  IN:    FentaNYL: 73.4 mL    IV PiggyBack: 100 mL    Midazolam: 20.4 mL    Propofol: 263.7 mL  Total IN: 457.5 mL    OUT:    Voided (mL): 1700 mL  Total OUT: 1700 mL  Total NET: -1242.5 mL      12 Oct 2023 07:01  -  13 Oct 2023 01:19  --------------------------------------------------------  IN:    Dexmedetomidine: 289 mL    Propofol: 70.6 mL  Total IN: 359.6 mL    OUT:    Indwelling Catheter - Urethral (mL): 2180 mL  Total OUT: 2180 mL  Total NET: -1820.4 mL      LABS:                        11.7   10.71 )-----------( 187      ( 12 Oct 2023 02:00 )             36.1     10-12    141  |  112<H>  |  11  ----------------------------<  91  3.9   |  24  |  1.00    Ca    8.1<L>      12 Oct 2023 02:00  Phos  3.1     10-12  Mg     2.5     10-12    TPro  6.3  /  Alb  3.2<L>  /  TBili  1.1  /  DBili  x   /  AST  32  /  ALT  25  /  AlkPhos  52  10-12      CARDIAC MARKERS ( 12 Oct 2023 02:00 )  x     / x     / 1074 U/L / x     / x      CARDIAC MARKERS ( 11 Oct 2023 16:36 )  x     / x     / 1043 U/L / x     / x          Urinalysis Basic - ( 12 Oct 2023 02:00 )  Color: x / Appearance: x / SG: x / pH: x  Gluc: 91 mg/dL / Ketone: x  / Bili: x / Urobili: x   Blood: x / Protein: x / Nitrite: x   Leuk Esterase: x / RBC: x / WBC x   Sq Epi: x / Non Sq Epi: x / Bacteria: x      RADIOLOGY:  < from: CT Head No Cont (10.11.23 @ 18:24) >    ACC: [id number] EXAM:  CT CERVICAL SPINE   ORDERED BY: DERIAN MADRIGAL     ACC: [id number] EXAM:  CT BRAIN   ORDERED BY: DERIAN MADRIGAL     PROCEDURE DATE:  10/11/2023      INTERPRETATION:  CT head without IV contrast    CLINICAL INFORMATION: Intracranial hemorrhage.    TECHNIQUE: Contiguous axial 5 mm sections were obtained through the head.   Sagittal and coronal 2-D reformatted images were also obtained.   This   scan was performed using automatic exposure control (radiation dose   reduction software) to obtain a diagnostic image quality scan with   patient dose as low as reasonably achievable.    FINDINGS:   No previous examinations are available for review.    The brain demonstrates no abnormal attenuation.   No acute cerebral   cortical infarct is seen.  No intracranial hemorrhage is found.  No mass   effect is found in the brain.    The ventricles, sulci and basal cisterns appear unremarkable.    The orbits are unremarkable.  The paranasal sinuses are significant for   mild mucosal thickening in the BILATERAL ethmoid sinuses.  The nasal   cavity and nasopharynx contains abundant secretions.  The central skull   base, petrous temporal bones and calvarium remain intact.    CT cervical spine without IV contrast    CLINICAL INFORMATION: Fracture, trauma, neck pain. trauma, ams, agitation    TECHNIQUE:  Contiguous axial 2.0 mm sections were obtained through the   cervical spine using a single helical acquisition.  Additional 2 mm   sagittal and coronal reformatted reconstructions of the spine were   obtained.  These additional reformatted images were used to evaluate the   spine for alignment, vertebral fractures and the integrity of the the   posterior elements.   This scan was performed using automatic exposure   control (radiation dose reduction software) to obtain a diagnostic image   quality scan with patient dose as low as reasonably achievable.    FINDINGS:   No prior similar studies are available for review    Cervical vertebral body heights are maintained. Novertebral fracture is   seen. No destructive bone lesion is found.  Alignment is preserved.    Facet joints appear intact and aligned.    Cervical intervertebral disc spaces show disc degeneration and   spondylosis at C2-3 through C5-6 with mild lossof disc height. Small   LEFT paramedian disc herniation noted at C2-3 which indents the ventral   cord. Narrowing of the BILATERAL C3-4, C4-5 neural foramina due to   uncovertebral spurring and facet osteophytic hypertrophy.    The skull base appearsintact.  No neck mass is recognized.  Paraspinal   soft tissues appear intact. Visualized lymph nodes appear to be within   physiologic size limits.      IMPRESSION:    CT HEAD: No acute abnormality.    Abundant secretions in the nasal cavity   andnasopharynx.    CT cervical spine:   No vertebral fracture is recognized.  Disc   degeneration and spondylosis at C2-3 through C5-6 with mild loss of disc   height. Small LEFT paramedian disc herniation noted at C2-3 which indents   the ventral cord.Narrowing of the BILATERAL C3-4, C4-5 neural foramina   due to uncovertebral spurring and facet osteophytic hypertrophy.    --- End of Report ---    ZINA GONZALEZ MD; Attending Radiologist  This document has been electronically signed. Oct 11 2023  6:45PM    < end of copied text >

## 2023-10-12 NOTE — BH CONSULTATION LIAISON ASSESSMENT NOTE - HPI (INCLUDE ILLNESS QUALITY, SEVERITY, DURATION, TIMING, CONTEXT, MODIFYING FACTORS, ASSOCIATED SIGNS AND SYMPTOMS)
27 yo male, originally from [facility], noncaregiver, domiciled, hx of Marijuana Abuse (reportedly has been smoking weed every day since 2020), including hx of substance induced psychosis involving unpredictable, disorganized behavior including trying to cut his own throat & has a collapsed lung/catatonic features, currently attends outpatient mental health services with a psychiatrist and psychologist for 2x/week therapy at the [facility], was on Abilify from Jan 2021-June 2021 until psychiatrist weaned him off. + hx of trauma (witnessed father's murder when Pt was aged 6). As per Pt's ex-girlfriend, she spoke to him 10/4 where he seemed "manic," pacing and not making sense when speaking. Night of coming to the hospital patient smoked weed with his friends, "seized up" and started becoming aggressive, which prompted EMS call. Patient was found on ground of ER parking lot with seizure like activity. As per pt's friend (Weston Barragan [phone]) pt smoked marijuana, 30 mins later became catatonic for appx 10-15 mins. While in ED, pt reportedly became extremely agitated, thrashing his body around, hitting his head, swinging his arms / legs with Code Grey called. He was given ketamine IM, however was requiring 4 pt restraints and ED physician intubated for patient and staff safety. Admitted to ICU, extubated on 10/12/23. CT head negative, but CT spine noting Cervical intervertebral disc spaces show disc degeneration and spondylosis at C2-3 through C5-6 with mild loss of disc height. Small LEFT paramedian disc herniation noted at C2-3 which indents the ventral cord. Narrowing of the BILATERAL C3-4, C4-5 neural foramina due to uncovertebral spurring and facet osteophytic hypertrophy. ED PA d/w ortho / spine with no immediate intervention required. UTOX not done, serum tox negative.     ISTOP Reference #: [id number] no record of patient  27 yo male, originally from [facility], noncaregiver, domiciled, hx of Marijuana Abuse (reportedly has been smoking weed every day since 2020), including hx of substance induced psychosis involving unpredictable, disorganized behavior including trying to cut his own throat & has a collapsed lung/catatonic features, currently attends outpatient mental health services with a psychiatrist and psychologist for 2x/week therapy at the [facility], was on Abilify from Jan 2021-June 2021 until psychiatrist weaned him off. + hx of trauma (witnessed father's murder when Pt was aged 6). As per Pt's ex-girlfriend, she spoke to him 10/4 where he seemed "manic," pacing and not making sense when speaking. Night of coming to the hospital patient smoked weed with his friends, "seized up" and started becoming aggressive, which prompted EMS call. Patient was found on ground of ER parking lot with seizure like activity. As per pt's friend (Weston Barragan [phone]) pt smoked marijuana, 30 mins later became catatonic for appx 10-15 mins. While in ED, pt reportedly became extremely agitated, thrashing his body around, hitting his head, swinging his arms / legs with Code Grey called. He was given ketamine IM, however was requiring 4 pt restraints and ED physician intubated for patient and staff safety. Admitted to ICU, extubated on 10/12/23. CT head negative, but CT spine noting Cervical intervertebral disc spaces show disc degeneration and spondylosis at C2-3 through C5-6 with mild loss of disc height. Small LEFT paramedian disc herniation noted at C2-3 which indents the ventral cord. Narrowing of the BILATERAL C3-4, C4-5 neural foramina due to uncovertebral spurring and facet osteophytic hypertrophy. ED PA d/w ortho / spine with no immediate intervention required. UTOX not done, serum tox negative.     ISTOP Reference #: [id number] no record of patient  27 yo male, originally from [facility], noncaregiver, domiciled, hx of Marijuana Abuse (reportedly has been smoking weed every day since 2020), including hx of substance induced psychosis involving unpredictable, disorganized behavior including trying to cut his own throat & has a collapsed lung/catatonic features, currently attends outpatient mental health services with a psychiatrist and psychologist for 2x/week therapy at the [facility], was on Abilify from Jan 2021-June 2021 until psychiatrist weaned him off. + hx of trauma (witnessed father's murder when Pt was aged 6). As per Pt's ex-girlfriend, she spoke to him 10/4 where he seemed "manic," pacing and not making sense when speaking. Night of coming to the hospital patient smoked weed with his friends, "seized up" and started becoming aggressive, which prompted EMS call. Patient was found on ground of ER parking lot with seizure like activity. As per pt's friend (Weston Barragan [phone]) pt smoked marijuana, 30 mins later became catatonic for appx 10-15 mins. While in ED, pt reportedly became extremely agitated, thrashing his body around, hitting his head, swinging his arms / legs with Code Grey called. He was given ketamine IM, however was requiring 4 pt restraints and ED physician intubated for patient and staff safety. Admitted to ICU, extubated on 10/12/23. CT head negative, but CT spine noting Cervical intervertebral disc spaces show disc degeneration and spondylosis at C2-3 through C5-6 with mild loss of disc height. Small LEFT paramedian disc herniation noted at C2-3 which indents the ventral cord. Narrowing of the BILATERAL C3-4, C4-5 neural foramina due to uncovertebral spurring and facet osteophytic hypertrophy. ED PA d/w ortho / spine with no immediate intervention required. UTOX not done, serum tox negative.     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 + hx of trauma (witnessed father's murder when Pt was aged 6). As per Pt's ex-girlfriend, she spoke to him 10/4 where he seemed "manic," pacing and not making sense when speaking. Night of coming to the hospital patient smoked weed with his friends, "seized up" and started becoming aggressive, which prompted EMS call. Patient was found on ground of ER parking lot with seizure like activity. As per pt's friend (Weston Barragan [phone]) pt smoked marijuana, 30 mins later became catatonic for appx 10-15 mins. While in ED, pt reportedly became extremely agitated, thrashing his body around, hitting his head, swinging his arms / legs with Code Grey called. He was given ketamine IM, however was requiring 4 pt restraints and ED physician intubated for patient and staff safety. Admitted to ICU, extubated on 10/12/23. CT head negative, but CT spine noting Cervical intervertebral disc spaces show disc degeneration and spondylosis at C2-3 through C5-6 with mild loss of disc height. Small LEFT paramedian disc herniation noted at C2-3 which indents the ventral cord. Narrowing of the BILATERAL C3-4, C4-5 neural foramina due to uncovertebral spurring and facet osteophytic hypertrophy. ED PA d/w ortho / spine with no immediate intervention required. UTOX not done, serum tox negative.     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 + hx of trauma (witnessed father's murder when Pt was aged 6). As per Pt's ex-girlfriend, she spoke to him 10/4 where he seemed "manic," pacing and not making sense when speaking. Night of coming to the hospital patient smoked weed with his friends, "seized up" and started becoming aggressive, which prompted EMS call. Patient was found on ground of ER parking lot with seizure like activity. As per pt's friend (Weston Barragan [phone]) pt smoked marijuana, 30 mins later became catatonic for appx 10-15 mins. While in ED, pt reportedly became extremely agitated, thrashing his body around, hitting his head, swinging his arms / legs with Code Grey called. He was given ketamine IM, however was requiring 4 pt restraints and ED physician intubated for patient and staff safety. Admitted to ICU, extubated on 10/12/23. CT head negative, but CT spine noting Cervical intervertebral disc spaces show disc degeneration and spondylosis at C2-3 through C5-6 with mild loss of disc height. Small LEFT paramedian disc herniation noted at C2-3 which indents the ventral cord. Narrowing of the BILATERAL C3-4, C4-5 neural foramina due to uncovertebral spurring and facet osteophytic hypertrophy. ED PA d/w ortho / spine with no immediate intervention required. UTOX not done, serum tox negative.     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 + hx of trauma (witnessed father's murder when Pt was aged 6). As per Pt's ex-girlfriend, she spoke to him 10/4 where he seemed "manic," pacing and not making sense when speaking. Night of coming to the hospital patient smoked weed with his friends, "seized up" and started becoming aggressive, which prompted EMS call. Patient was found on ground of ER parking lot with seizure like activity. As per pt's friend (Weston Barragan [phone]) pt smoked marijuana, 30 mins later became catatonic for appx 10-15 mins. While in ED, pt reportedly became extremely agitated, thrashing his body around, hitting his head, swinging his arms / legs with Code Grey called. He was given ketamine IM, however was requiring 4 pt restraints and ED physician intubated for patient and staff safety. Admitted to ICU, extubated on 10/12/23. CT head negative, but CT spine noting Cervical intervertebral disc spaces show disc degeneration and spondylosis at C2-3 through C5-6 with mild loss of disc height. Small LEFT paramedian disc herniation noted at C2-3 which indents the ventral cord. Narrowing of the BILATERAL C3-4, C4-5 neural foramina due to uncovertebral spurring and facet osteophytic hypertrophy. ED PA d/w ortho / spine with no immediate intervention required. UTOX not done, serum tox negative.     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), with hx of Marijuana Abuse (reportedly has been smoking weed every day since 2020 including day of admission), including hx of substance induced psychosis involving unpredictable, disorganized behavior including trying to cut his own throat & has a collapsed lung/catatonic features resulting in admission to [facility] 1/19-1/24/2022 with referral to [facility] for addiction services from 2/5/22-6/9/23, Py still currently attends outpatient mental health services with a psychiatrist and psychologist for 2x/week th+ [facility] for addiction services from 2/5/22-6/9/23, who was experiencing life stressors, some mood changes, asking his brother to come stay with him in the last few days, with poor sleep the night before admission, but was able to go to work and do his job well the day of admission during which time everyone there was smoking the same Marijuana with no one else reportedly experiencing any issues/side effects. At one point in time, Pt was looking at his phone and his face changed. His brother at this time asked if all was okay and offered to drive him home. In the car ride sad nothing and remained quiet, affect constricted and was staring ahead. Once brother dropped off his daughter, Patient started to have a "nervous shake" which later evolved to the whole body. Brother decided to drive him back to [facility] as he was there before but the shaking became so strong that he ended up coming to the nearest ED. As per Pt's ex-girlfriend, she spoke to him 10/4 where he seemed "manic," pacing and not making sense when speaking. Night of coming to the hospital patient smoked weed with his friends, "seized up" and started becoming aggressive, which prompted EMS call. Patient was found on ground of ER parking lot with seizure like activity. While in ED, pt reportedly became extremely agitated, thrashing his body around, hitting his head, swinging his arms / legs with Code Grey called. He was given ketamine IM, however was requiring 4 pt restraints and ED physician intubated for patient and staff safety. Admitted to ICU, extubated on 10/12/23. CT head negative, but CT spine noting Cervical intervertebral disc spaces show disc degeneration and spondylosis at C2-3 through C5-6 with mild loss of disc height. Small LEFT paramedian disc herniation noted at C2-3 which indents the ventral cord. Narrowing of the BILATERAL C3-4, C4-5 neural foramina due to uncovertebral spurring and facet osteophytic hypertrophy. Cr elevated at 1.65, ED PA d/w ortho / spine with no immediate intervention required. UTOX + THC, benzo, serum tox negative.     EXAM: awake, not alert, mumbling at times but not able to tolerate an interview, No abnormal movements seen.     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), with hx of Marijuana Abuse (reportedly has been smoking weed every day since 2020 including day of admission), including hx of substance induced psychosis involving unpredictable, disorganized behavior including trying to cut his own throat & has a collapsed lung/catatonic features resulting in admission to [facility] 1/19-1/24/2022 with referral to [facility] for addiction services from 2/5/22-6/9/23, Py still currently attends outpatient mental health services with a psychiatrist and psychologist for 2x/week th+ [facility] for addiction services from 2/5/22-6/9/23, who was experiencing life stressors, some mood changes, asking his brother to come stay with him in the last few days, with poor sleep the night before admission, but was able to go to work and do his job well the day of admission during which time everyone there was smoking the same Marijuana with no one else reportedly experiencing any issues/side effects. At one point in time, Pt was looking at his phone and his face changed. His brother at this time asked if all was okay and offered to drive him home. In the car ride sad nothing and remained quiet, affect constricted and was staring ahead. Once brother dropped off his daughter, Patient started to have a "nervous shake" which later evolved to the whole body. Brother decided to drive him back to [facility] as he was there before but the shaking became so strong that he ended up coming to the nearest ED. As per Pt's ex-girlfriend, she spoke to him 10/4 where he seemed "manic," pacing and not making sense when speaking. Night of coming to the hospital patient smoked weed with his friends, "seized up" and started becoming aggressive, which prompted EMS call. Patient was found on ground of ER parking lot with seizure like activity. While in ED, pt reportedly became extremely agitated, thrashing his body around, hitting his head, swinging his arms / legs with Code Grey called. He was given ketamine IM, however was requiring 4 pt restraints and ED physician intubated for patient and staff safety. Admitted to ICU, extubated on 10/12/23. CT head negative, but CT spine noting Cervical intervertebral disc spaces show disc degeneration and spondylosis at C2-3 through C5-6 with mild loss of disc height. Small LEFT paramedian disc herniation noted at C2-3 which indents the ventral cord. Narrowing of the BILATERAL C3-4, C4-5 neural foramina due to uncovertebral spurring and facet osteophytic hypertrophy. Cr elevated at 1.65, ED PA d/w ortho / spine with no immediate intervention required. UTOX + THC, benzo, serum tox negative.     EXAM: awake, not alert, mumbling at times but not able to tolerate an interview, No abnormal movements seen.     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), with hx of Marijuana Abuse (reportedly has been smoking weed every day since 2020 including day of admission), including hx of substance induced psychosis involving unpredictable, disorganized behavior including trying to cut his own throat & has a collapsed lung/catatonic features resulting in admission to [facility] 1/19-1/24/2022 with referral to [facility] for addiction services from 2/5/22-6/9/23, Py still currently attends outpatient mental health services with a psychiatrist and psychologist for 2x/week th+ [facility] for addiction services from 2/5/22-6/9/23, who was experiencing life stressors, some mood changes, asking his brother to come stay with him in the last few days, with poor sleep the night before admission, but was able to go to work and do his job well the day of admission during which time everyone there was smoking the same Marijuana with no one else reportedly experiencing any issues/side effects. At one point in time, Pt was looking at his phone and his face changed. His brother at this time asked if all was okay and offered to drive him home. In the car ride sad nothing and remained quiet, affect constricted and was staring ahead. Once brother dropped off his daughter, Patient started to have a "nervous shake" which later evolved to the whole body. Brother decided to drive him back to [facility] as he was there before but the shaking became so strong that he ended up coming to the nearest ED. As per Pt's ex-girlfriend, she spoke to him 10/4 where he seemed "manic," pacing and not making sense when speaking. Night of coming to the hospital patient smoked weed with his friends, "seized up" and started becoming aggressive, which prompted EMS call. Patient was found on ground of ER parking lot with seizure like activity. While in ED, pt reportedly became extremely agitated, thrashing his body around, hitting his head, swinging his arms / legs with Code Grey called. He was given ketamine IM, however was requiring 4 pt restraints and ED physician intubated for patient and staff safety. Admitted to ICU, extubated on 10/12/23. CT head negative, but CT spine noting Cervical intervertebral disc spaces show disc degeneration and spondylosis at C2-3 through C5-6 with mild loss of disc height. Small LEFT paramedian disc herniation noted at C2-3 which indents the ventral cord. Narrowing of the BILATERAL C3-4, C4-5 neural foramina due to uncovertebral spurring and facet osteophytic hypertrophy. Cr elevated at 1.65, ED PA d/w ortho / spine with no immediate intervention required. UTOX + THC, benzo, serum tox negative.     EXAM: awake, not alert, mumbling at times but not able to tolerate an interview, No abnormal movements seen.     PLEASE SEE SEPARATE  NOTE FOR COLLATERAL FROM FRIEND/BROTHER, EX-GF AND CVM CHART HX FROM [facility]     ISTOP Reference #: [id number] no record of patient

## 2023-10-12 NOTE — CONSULT NOTE ADULT - SUBJECTIVE AND OBJECTIVE BOX
28 year old male with a PMH of bipolar disorder, s/p prior suicide attempt, h/o cutting and inpatient stay ~1 year ago who presented to [facility] ED earlier today brought in by his friend for "bizarre behavior." Per his friend, pt has recently been stressed and had been smoking marijuana and became noticeably agitated causing his friends to become concerned he was going to have a psychiatric breakdown. Upon arrival tonight, pt fell outside hospital, hit his head and was unable to answer any questions. Prior to this, pt reportedly would not speak or move. While in ED, pt reportedly became extremely agitated, thrashing his body around, hitting his head, swinging his arms / legs with code grey called. He was given ketamine IM, however was requiring 4 pt restraints and ED physician intubated for patient and staff safety. ICU was consulted for admission. Orthopedic surgery was consulted for CT cervical spine results showing C2-3 left sided paramedian disc herniation.     Bipolar disorder    Suicide attempt      Allergy Status Unknown    Vitals:  T(C): 35.3 (10-12-23 @ 01:00), Max: 37.7 (10-11-23 @ 16:13)  HR: 53 (10-12-23 @ 01:00) (48 - 127)  BP: 109/65 (10-12-23 @ 01:00) (109/65 - 209/118)  RR: 18 (10-12-23 @ 01:00) (18 - 23)  SpO2: 100% (10-12-23 @ 01:00) (98% - 100%)    Exam:  Gen: Sedated and intubated; does not respond to commands, touch, or pain    Unable to perform neuro exam given sedated/intubated state  Patient does not respond to touch or pain throughout the body  Negative Babinski bilaterally  Negative Clonus bilaterally  Negative Hsi bilaterally    No bony step offs or crepitus upon palpation or ROM of shoulder, elbow, wrist, hip, knee, and ankle joints appreciated throughout extremities  No bony step offs throughout cervical, thoracic, and lumbar spine     Imaging:  No vertebral fracture is recognized.  Disc degeneration and spondylosis at C2-3 through C5-6 with mild loss of disc height. Small LEFT paramedian disc herniation noted at C2-3 which indents   the ventral cord. Narrowing of the BILATERAL C3-4, C4-5 neural foramina due to uncovertebral spurring and facet osteophytic hypertrophy.    A/P: 28M with C2-3 disc herniation found on CT cervical spine, currently sedated and intubated      WBAT  DVT ppx per primary team  Medical recommendations appreciated   No acute orthopedic surgical intervention indicated at this time based on the appearance of CT cervical spine; however patient will require thorough neurological exam for final plan  Reconsult orthopedic surgery when patient is awake and alert for further neurological assessment   Discussed above with Dr. Schrader, who agrees with plan 28 year old male with a PMH of bipolar disorder, s/p prior suicide attempt, h/o cutting and inpatient stay ~1 year ago who presented to [facility] ED earlier today brought in by his friend for "bizarre behavior." Per his friend, pt has recently been stressed and had been smoking marijuana and became noticeably agitated causing his friends to become concerned he was going to have a psychiatric breakdown. Upon arrival tonight, pt fell outside hospital, hit his head and was unable to answer any questions. Prior to this, pt reportedly would not speak or move. While in ED, pt reportedly became extremely agitated, thrashing his body around, hitting his head, swinging his arms / legs with code grey called. He was given ketamine IM, however was requiring 4 pt restraints and ED physician intubated for patient and staff safety. ICU was consulted for admission. Orthopedic surgery was consulted for CT cervical spine results showing C2-3 left sided paramedian disc herniation.     Bipolar disorder    Suicide attempt      Allergy Status Unknown    Vitals:  T(C): 35.3 (10-12-23 @ 01:00), Max: 37.7 (10-11-23 @ 16:13)  HR: 53 (10-12-23 @ 01:00) (48 - 127)  BP: 109/65 (10-12-23 @ 01:00) (109/65 - 209/118)  RR: 18 (10-12-23 @ 01:00) (18 - 23)  SpO2: 100% (10-12-23 @ 01:00) (98% - 100%)    Exam:  Gen: Sedated and intubated; does not respond to commands, touch, or pain    Unable to perform neuro exam given sedated/intubated state  Patient does not respond to touch or pain throughout the body  Negative Babinski bilaterally  Negative Clonus bilaterally  Negative Shi bilaterally    No bony step offs or crepitus upon palpation or ROM of shoulder, elbow, wrist, hip, knee, and ankle joints appreciated throughout extremities  No bony step offs throughout cervical, thoracic, and lumbar spine     Imaging:  No vertebral fracture is recognized.  Disc degeneration and spondylosis at C2-3 through C5-6 with mild loss of disc height. Small LEFT paramedian disc herniation noted at C2-3 which indents   the ventral cord. Narrowing of the BILATERAL C3-4, C4-5 neural foramina due to uncovertebral spurring and facet osteophytic hypertrophy.    A/P: 28M with C2-3 disc herniation found on CT cervical spine, currently sedated and intubated      WBAT  DVT ppx per primary team  Medical recommendations appreciated   No acute orthopedic surgical intervention indicated at this time based on the appearance of CT cervical spine; however patient will require thorough neurological exam for final plan  Reconsult orthopedic surgery when patient is awake and alert for further neurological assessment   Discussed above with Dr. Schrader, who agrees with plan 28 year old male with a PMH of bipolar disorder, s/p prior suicide attempt, h/o cutting and inpatient stay ~1 year ago who presented to [facility] ED earlier today brought in by his friend for "bizarre behavior." Per his friend, pt has recently been stressed and had been smoking marijuana and became noticeably agitated causing his friends to become concerned he was going to have a psychiatric breakdown. Upon arrival tonight, pt fell outside hospital, hit his head and was unable to answer any questions. Prior to this, pt reportedly would not speak or move. While in ED, pt reportedly became extremely agitated, thrashing his body around, hitting his head, swinging his arms / legs with code grey called. He was given ketamine IM, however was requiring 4 pt restraints and ED physician intubated for patient and staff safety. ICU was consulted for admission. Orthopedic surgery was consulted for CT cervical spine results showing C2-3 left sided paramedian disc herniation.     Bipolar disorder    Suicide attempt      Allergy Status Unknown    Vitals:  T(C): 35.3 (10-12-23 @ 01:00), Max: 37.7 (10-11-23 @ 16:13)  HR: 53 (10-12-23 @ 01:00) (48 - 127)  BP: 109/65 (10-12-23 @ 01:00) (109/65 - 209/118)  RR: 18 (10-12-23 @ 01:00) (18 - 23)  SpO2: 100% (10-12-23 @ 01:00) (98% - 100%)    Exam:  Gen: Sedated and intubated; does not respond to commands, touch, or pain    Unable to perform neuro exam given sedated/intubated state  Patient does not respond to touch or pain throughout the body  Negative Babinski bilaterally  Negative Clonus bilaterally  Negative Shi bilaterally    No bony step offs or crepitus upon palpation or ROM of shoulder, elbow, wrist, hip, knee, and ankle joints appreciated throughout extremities  No bony step offs throughout cervical, thoracic, and lumbar spine     Imaging:  No vertebral fracture is recognized.  Disc degeneration and spondylosis at C2-3 through C5-6 with mild loss of disc height. Small LEFT paramedian disc herniation noted at C2-3 which indents   the ventral cord. Narrowing of the BILATERAL C3-4, C4-5 neural foramina due to uncovertebral spurring and facet osteophytic hypertrophy.    A/P: 28M with C2-3 disc herniation found on CT cervical spine, currently sedated and intubated      WBAT  DVT ppx per primary team  Medical recommendations appreciated   No acute orthopedic surgical intervention indicated at this time based on the appearance of CT cervical spine; however patient will require thorough neurological exam for final plan  Reconsult orthopedic surgery when patient is awake and alert for further neurological assessment   Discussed above with Dr. Schrader, who agrees with plan

## 2023-10-12 NOTE — BH CONSULTATION LIAISON ASSESSMENT NOTE - NSBHCHARTREVIEWLAB_PSY_A_CORE FT
10-12    141  |  112<H>  |  11  ----------------------------<  91  3.9   |  24  |  1.00    Ca    8.1<L>      12 Oct 2023 02:00  Phos  3.1     10-12  Mg     2.5     10-12    TPro  6.3  /  Alb  3.2<L>  /  TBili  1.1  /  DBili  x   /  AST  32  /  ALT  25  /  AlkPhos  52  10-12

## 2023-10-12 NOTE — PROVIDER CONTACT NOTE (CHANGE IN STATUS NOTIFICATION) - ACTION/TREATMENT ORDERED:
Patient given haloperidol, propofol, started on Precedex drip. Patient placed on 4 point restraints and constant observation.

## 2023-10-12 NOTE — PROGRESS NOTE ADULT - ASSESSMENT
27 yo m pmhx bipolar disorder s/p prior suicide attempt and self harm requiring ~1 year inpatient stay admitted with severe agitation requiring intubation.     NEURO: sedated on precedex, prn versed/haldol.  remains in 4 point restraints 2/2 severity of episodic agitation.  CV: close hd monitoring   RESP: nc for spo2 >92%  RENAL: lawanda improving, avoid nephrotoxic meds, renally dose meds, trend urine output, bun/cr and electrolytes.  replace lytes  GI: npo   ENDO: No active issues  ID: No active infectious process   HEME: Heparin for vte ppx   DISPO: Full code  29 yo m pmhx bipolar disorder s/p prior suicide attempt and self harm requiring ~1 year inpatient stay admitted with severe agitation requiring intubation.     NEURO: sedated on precedex, prn versed/haldol.  remains in 4 point restraints 2/2 severity of episodic agitation.  CV: close hd monitoring   RESP: nc for spo2 >92%  RENAL: lawanda improving, avoid nephrotoxic meds, renally dose meds, trend urine output, bun/cr and electrolytes.  replace lytes  GI: npo   ENDO: No active issues  ID: No active infectious process   HEME: Heparin for vte ppx   DISPO: Full code

## 2023-10-12 NOTE — BH CONSULTATION LIAISON ASSESSMENT NOTE - PERSONAL COLLATERAL NAME
Pt's brother Weston Call and best friend / ex-girlfriend Veronica both at bedside  Pt's friend (though introduces himself as Pt's brother) Weston Call and best friend / ex-girlfriend Veronica both at bedside

## 2023-10-12 NOTE — PROVIDER CONTACT NOTE (EICU) - SITUATION
MUNIRA terrell by bedside team;  When I camera in - ICU attending Dr Torres and ICU team members at bedside calming pt and positioning him in bed.  My help was not needed at this time

## 2023-10-12 NOTE — PROGRESS NOTE ADULT - SUBJECTIVE AND OBJECTIVE BOX
HPI:  Pt is a 27 yo BM with h/o bipolar disorder, s/p prior suicide attempt, h/o cutting and inpatient stay ~1 year ago who presented to [facility] 10/11 brought in by his friend for "bizarre behavior." Per his friend, pt has recently been stressed and had been smoking marijuana and became noticeably agitated causing his friends to become concerned he was going to have a psychiatric breakdown. Upon arrival tonight, pt fell outside hospital, hit his head and was unable to answer any questions. Prior to this, pt reportedly would not speak or move. While in ER pt reportedly became extremely agitated, thrashing his body around, hitting his head, swinging his arms / legs with code grey called. He was given ketamine IM, however was requiring 4 pt restraints and ER physician intubated for patient and staff safety. ICU dx: Psychotic episode with extreme agitation requiring sedation protocol and intubation      ## Labs:  CBC:                        11.7   10.71 )-----------( 187      ( 12 Oct 2023 02:00 )             36.1     Chem:  10-12    141  |  112<H>  |  11  ----------------------------<  91  3.9   |  24  |  1.00    Ca    8.1<L>      12 Oct 2023 02:00  Phos  3.1     10-12  Mg     2.5     10-12    TPro  6.3  /  Alb  3.2<L>  /  TBili  1.1  /  DBili  x   /  AST  32  /  ALT  25  /  AlkPhos  52  10-12    Coags:          ## Imaging:    ## Medications:          heparin   Injectable 5000 Unit(s) SubCutaneous every 8 hours    pantoprazole  Injectable 40 milliGRAM(s) IV Push daily    dexMEDEtomidine Infusion 1.5 MICROgram(s)/kG/Hr IV Continuous <Continuous>  propofol Infusion 10 MICROgram(s)/kG/Min IV Continuous <Continuous>      ## Vitals:  T(C): 37.5 (10-12-23 @ 19:06), Max: 38 (10-12-23 @ 06:30)  HR: 54 (10-12-23 @ 19:30) (44 - 112)  BP: 146/96 (10-12-23 @ 19:30) (98/62 - 166/97)  BP(mean): 110 (10-12-23 @ 19:30) (74 - 114)  RR: 18 (10-12-23 @ 19:30) (10 - 24)  SpO2: 100% (10-12-23 @ 19:30) (94% - 100%)  Wt(kg): --  Vent: Mode: CPAP with PS, RR (patient): 17, FiO2: 40, PEEP: 5, PS: 5, PIP: 15  ABG: ABG - ( 11 Oct 2023 17:27 )  pH, Arterial: 7.41  pH, Blood: x     /  pCO2: 39    /  pO2: 110   / HCO3: 25    / Base Excess: 0.1   /  SaO2: 99.0                  10-11 @ 07:01  -  10-12 @ 07:00  --------------------------------------------------------  IN: 457.5 mL / OUT: 1700 mL / NET: -1242.5 mL    10-12 @ 07:01  -  10-12 @ 20:26  --------------------------------------------------------  IN: 203.6 mL / OUT: 1475 mL / NET: -1271.4 mL          ## P/E:  Gen: lying comfortably in bed in no apparent distress  Mouth: (+) ETT/ OGT  Lungs: CTA  Heart: RRR  Abd: Soft/(+)BS  Ext: No edema  Neuro: Sedated    CENTRAL LINE: [ ] YES [ ] NO  LOCATION:   DATE INSERTED:  REMOVE: [ ] YES [ ] NO      FREEMAN: [ ] YES [ ] NO    DATE INSERTED:  REMOVE:  [ ] YES [ ] NO      A-LINE:  [ ] YES [ ] NO  LOCATION:   DATE INSERTED:  REMOVE:  [ ] YES [ ] NO  EXPLAIN:    CODE STATUS: [x ] full code  [ ] DNR  [ ] DNI  [ ] [facility]  Goals of care discussion: [ ] yes  HPI:  Pt is a 27 yo BM with h/o bipolar disorder, s/p prior suicide attempt, h/o cutting and inpatient stay ~1 year ago who presented to [facility] 10/11 brought in by his friend for "bizarre behavior." Per his friend, pt has recently been stressed and had been smoking marijuana and became noticeably agitated causing his friends to become concerned he was going to have a psychiatric breakdown. Upon arrival tonight, pt fell outside hospital, hit his head and was unable to answer any questions. Prior to this, pt reportedly would not speak or move. While in ER pt reportedly became extremely agitated, thrashing his body around, hitting his head, swinging his arms / legs with code grey called. He was given ketamine IM, however was requiring 4 pt restraints and ER physician intubated for patient and staff safety. ICU dx: Psychotic episode with extreme agitation requiring sedation protocol and intubation      ## Labs:  CBC:                        11.7   10.71 )-----------( 187      ( 12 Oct 2023 02:00 )             36.1     Chem:  10-12    141  |  112<H>  |  11  ----------------------------<  91  3.9   |  24  |  1.00    Ca    8.1<L>      12 Oct 2023 02:00  Phos  3.1     10-12  Mg     2.5     10-12    TPro  6.3  /  Alb  3.2<L>  /  TBili  1.1  /  DBili  x   /  AST  32  /  ALT  25  /  AlkPhos  52  10-12    Coags:          ## Imaging:    ## Medications:          heparin   Injectable 5000 Unit(s) SubCutaneous every 8 hours    pantoprazole  Injectable 40 milliGRAM(s) IV Push daily    dexMEDEtomidine Infusion 1.5 MICROgram(s)/kG/Hr IV Continuous <Continuous>  propofol Infusion 10 MICROgram(s)/kG/Min IV Continuous <Continuous>      ## Vitals:  T(C): 37.5 (10-12-23 @ 19:06), Max: 38 (10-12-23 @ 06:30)  HR: 54 (10-12-23 @ 19:30) (44 - 112)  BP: 146/96 (10-12-23 @ 19:30) (98/62 - 166/97)  BP(mean): 110 (10-12-23 @ 19:30) (74 - 114)  RR: 18 (10-12-23 @ 19:30) (10 - 24)  SpO2: 100% (10-12-23 @ 19:30) (94% - 100%)  Wt(kg): --  Vent: Mode: CPAP with PS, RR (patient): 17, FiO2: 40, PEEP: 5, PS: 5, PIP: 15  ABG: ABG - ( 11 Oct 2023 17:27 )  pH, Arterial: 7.41  pH, Blood: x     /  pCO2: 39    /  pO2: 110   / HCO3: 25    / Base Excess: 0.1   /  SaO2: 99.0                  10-11 @ 07:01  -  10-12 @ 07:00  --------------------------------------------------------  IN: 457.5 mL / OUT: 1700 mL / NET: -1242.5 mL    10-12 @ 07:01  -  10-12 @ 20:26  --------------------------------------------------------  IN: 203.6 mL / OUT: 1475 mL / NET: -1271.4 mL          ## P/E:  Gen: lying comfortably in bed in no apparent distress  Mouth: (+) ETT/ OGT  Lungs: CTA  Heart: RRR  Abd: Soft/(+)BS  Ext: No edema  Neuro: Sedated    CENTRAL LINE: [ ] YES [ ] NO  LOCATION:   DATE INSERTED:  REMOVE: [ ] YES [ ] NO      FREEMAN: [ ] YES [ ] NO    DATE INSERTED:  REMOVE:  [ ] YES [ ] NO      A-LINE:  [ ] YES [ ] NO  LOCATION:   DATE INSERTED:  REMOVE:  [ ] YES [ ] NO  EXPLAIN:    CODE STATUS: [x ] full code  [ ] DNR  [ ] DNI  [ ] [facility]  Goals of care discussion: [ ] yes  HPI:  Pt is a 29 yo BM with h/o bipolar disorder, s/p prior suicide attempt, h/o cutting and inpatient stay ~1 year ago who presented to [facility] 10/11 brought in by his friend for "bizarre behavior." Per his friend, pt has recently been stressed and had been smoking marijuana and became noticeably agitated causing his friends to become concerned he was going to have a psychiatric breakdown. Upon arrival tonight, pt fell outside hospital, hit his head and was unable to answer any questions. Prior to this, pt reportedly would not speak or move. While in ER pt reportedly became extremely agitated, thrashing his body around, hitting his head, swinging his arms / legs with code grey called. He was given ketamine IM, however was requiring 4 pt restraints and ER physician intubated for patient and staff safety. ICU dx: Psychotic episode with extreme agitation requiring sedation protocol and intubation      ## Labs:  CBC:                        11.7   10.71 )-----------( 187      ( 12 Oct 2023 02:00 )             36.1     Chem:  10-12    141  |  112<H>  |  11  ----------------------------<  91  3.9   |  24  |  1.00    Ca    8.1<L>      12 Oct 2023 02:00  Phos  3.1     10-12  Mg     2.5     10-12    TPro  6.3  /  Alb  3.2<L>  /  TBili  1.1  /  DBili  x   /  AST  32  /  ALT  25  /  AlkPhos  52  10-12    Coags:          ## Imaging:    ## Medications:          heparin   Injectable 5000 Unit(s) SubCutaneous every 8 hours    pantoprazole  Injectable 40 milliGRAM(s) IV Push daily    dexMEDEtomidine Infusion 1.5 MICROgram(s)/kG/Hr IV Continuous <Continuous>  propofol Infusion 10 MICROgram(s)/kG/Min IV Continuous <Continuous>      ## Vitals:  T(C): 37.5 (10-12-23 @ 19:06), Max: 38 (10-12-23 @ 06:30)  HR: 54 (10-12-23 @ 19:30) (44 - 112)  BP: 146/96 (10-12-23 @ 19:30) (98/62 - 166/97)  BP(mean): 110 (10-12-23 @ 19:30) (74 - 114)  RR: 18 (10-12-23 @ 19:30) (10 - 24)  SpO2: 100% (10-12-23 @ 19:30) (94% - 100%)  Wt(kg): --  Vent: Mode: CPAP with PS, RR (patient): 17, FiO2: 40, PEEP: 5, PS: 5, PIP: 15  ABG: ABG - ( 11 Oct 2023 17:27 )  pH, Arterial: 7.41  pH, Blood: x     /  pCO2: 39    /  pO2: 110   / HCO3: 25    / Base Excess: 0.1   /  SaO2: 99.0                  10-11 @ 07:01  -  10-12 @ 07:00  --------------------------------------------------------  IN: 457.5 mL / OUT: 1700 mL / NET: -1242.5 mL    10-12 @ 07:01  -  10-12 @ 20:26  --------------------------------------------------------  IN: 203.6 mL / OUT: 1475 mL / NET: -1271.4 mL          ## P/E:  Gen: lying comfortably in bed in no apparent distress  Mouth: (+) ETT/ OGT  Lungs: CTA  Heart: RRR  Abd: Soft/(+)BS  Ext: No edema  Neuro: Sedated    CENTRAL LINE: [ ] YES [ ] NO  LOCATION:   DATE INSERTED:  REMOVE: [ ] YES [ ] NO      FREEMAN: [ ] YES [ ] NO    DATE INSERTED:  REMOVE:  [ ] YES [ ] NO      A-LINE:  [ ] YES [ ] NO  LOCATION:   DATE INSERTED:  REMOVE:  [ ] YES [ ] NO  EXPLAIN:    CODE STATUS: [x ] full code  [ ] DNR  [ ] DNI  [ ] [facility]  Goals of care discussion: [ ] yes

## 2023-10-12 NOTE — BH CONSULTATION LIAISON ASSESSMENT NOTE - RISK ASSESSMENT
Chronic risk factors: single, male gender, ongoing substance abuse; psychiatric hx including SIB/SA: Protective factors: young; healthy; treatment compliant; no hx of aggression/violence; no legal issues; stable domicile, strong social/family support; stable domicile, engaged in work, access to health services. Acute risk factors identified: see HPI, AMS, agitation

## 2023-10-12 NOTE — CHART NOTE - NSCHARTNOTEFT_GEN_A_CORE
Patient History taken by ex-girlfriend who is bedside, as well as ex girlfriends mom and sister:    Patient moved here from [facility], where father was head of Narcotics Dept. Witnessed father being murdered in front of him when he was 6 years old. Patient had prior episode similar to this in Jan 2021 at [facility] where patient smoked marijuana, slit his own throat in suicide attempt, and became "catatonic." Patient was then referred to a psychiatrist and psychologist (Darrel at [facility]) where he goes to therapy once every two weeks. Patient was on Abilify from Jan 2021-June 2021 until psychiatrist weaned him off.     Patient's ex-girlfriend spoke to him 10/4 where he seemed "manic," pacing and not making sense when speaking. Night of coming to the hospital patient smoked weed with his friends, "seized up" and started becoming aggressive, which prompted EMS call. Patient has been smoking weed every day since 2020, ex girlfriend states he has many depressive episodes on and off for the last three years. She does not believe he takes any other drugs and only drinks alcohol " socially" Patient History taken by ex-girlfriend who is bedside, as well as ex girlfriends mom and sister:    Patient moved here from [facility], where father was head of Narcotics Dept. Witnessed father being murdered in front of him when he was 6 years old. Patient had prior episode similar to this in Jan 2021 at [facility] where patient smoked marijuana, slit his own throat in suicide attempt, and became "catatonic." Patient was then referred to a psychiatrist and psychologist (Darrel at [facility]) where he goes to therapy once every two weeks. Patient was on Abilify from Jan 2021-June 2021 until psychiatrist weaned him off.     Patient's ex-girlfriend spoke to him 10/4 where he seemed "manic," pacing and not making sense when speaking. Night of coming to the hospital patient smoked weed with his friends, "seized up" and started becoming aggressive, which prompted EMS call. Patient has been smoking weed every day since 2020, ex girlfriend states he has many depressive episodes on and off for the last three years. She does not believe he takes any other drugs and only drinks alcohol " socially" Patient History taken by ex-girlfriend who is bedside, as well as ex girlfriends mom and sister:    Patient moved here from [facility], where father was head of Narcotics Dept. Witnessed father being murdered in front of him when he was 6 years old. Patient had prior episode similar to this in Jan 2021 at [facility] where patient smoked marijuana, slit his own throat in suicide attempt, and became "catatonic." Patient was then referred to a psychiatrist and psychologist (Darrel at [facility]) where he goes to therapy once every two weeks. Patient was on Abilify from Jan 2021-June 2021 until psychiatrist weaned him off.     Patient's ex-girlfriend spoke to him 10/4 where he seemed "manic," pacing and not making sense when speaking. Night of coming to the hospital patient smoked weed with his friends, "seized up" and started becoming aggressive, which prompted EMS call. Patient has been smoking weed every day since 2020, ex girlfriend states he has many depressive episodes on and off for the last three years. She does not believe he takes any other drugs and only drinks alcohol " socially"

## 2023-10-12 NOTE — BH CONSULTATION LIAISON ASSESSMENT NOTE - NSBHCONSULTRECOMMENDOTHER_PSY_A_CORE FT
- Neurology consultation indicated. Meanwhile would get MRI and EEG  - consider possible ingestion of a laced substance  - degenerative spine disease not correlating to Pt's young age (incidental finding)   - for severe agitation, would give combination of haldol 5mg IVP with Versed 2mg IVP q6hrs PRN

## 2023-10-12 NOTE — BH CONSULTATION LIAISON ASSESSMENT NOTE - NSICDXBHPRIMARYDX_PSY_ALL_CORE
AMS (altered mental status)   R41.37   AMS (altered mental status)   R41.88   AMS (altered mental status)   R41.16

## 2023-10-12 NOTE — BH CONSULTATION LIAISON ASSESSMENT NOTE - VIOLENCE PROTECTIVE FACTORS:
Residential stability/Relationship stability/Engagement in treatment/Insight into violence risk and need for management/treatment/Good treatment response/compliance

## 2023-10-12 NOTE — BH CONSULTATION LIAISON ASSESSMENT NOTE - ADDITIONAL DETAILS ALL
chart hx of suicide attempt - unclear if Patient was intoxicated on any substance at the time of the self-injurious behavior

## 2023-10-12 NOTE — PROGRESS NOTE ADULT - ASSESSMENT
Pt is a 29 yo BM with h/o bipolar disorder, s/p prior suicide attempt, h/o cutting and inpatient stay ~1 year ago who presented to [facility] 10/11 brought in by his friend for "bizarre behavior." Per his friend, pt has recently been stressed and had been smoking marijuana and became noticeably agitated causing his friends to become concerned he was going to have a psychiatric breakdown. Upon arrival tonight, pt fell outside hospital, hit his head and was unable to answer any questions. Prior to this, pt reportedly would not speak or move. While in ER pt reportedly became extremely agitated, thrashing his body around, hitting his head, swinging his arms / legs with code grey called. He was given ketamine IM, however was requiring 4 pt restraints and ER physician intubated for patient and staff safety. ICU dx: Psychotic episode with extreme agitation requiring sedation protocol and intubation. Overnight uneventful pt sedated on MV. This am sedation held, pt calm and weaned well on CPAP 5 + PS 5; pt extubated. Later as pt became more awake he became agitated, shaking and tried to get out of bed. Multiple members of the ICU team required to hold pt down and pt Rx'd with multiple pushes of Propofol 30mg and Haldol 5mg. Pt calmed down initially and then became agitated again + he put both hands in his mouth pulling his mandible down. Pt started on a Precedex drip and calmed down    Resp: Elevate head/ Place on CO2 monitor  HEME: DVT prophylaxis with sq Heparin  FEN: NPO until calm  Psych: Obtain psych hx/ Recommendations as per Psych consult  Neuro: Doubt pt had Sz   Pt is a 29 yo BM with h/o bipolar disorder, s/p prior suicide attempt, h/o cutting and inpatient stay ~1 year ago who presented to [facility] 10/11 brought in by his friend for "bizarre behavior." Per his friend, pt has recently been stressed and had been smoking marijuana and became noticeably agitated causing his friends to become concerned he was going to have a psychiatric breakdown. Upon arrival tonight, pt fell outside hospital, hit his head and was unable to answer any questions. Prior to this, pt reportedly would not speak or move. While in ER pt reportedly became extremely agitated, thrashing his body around, hitting his head, swinging his arms / legs with code grey called. He was given ketamine IM, however was requiring 4 pt restraints and ER physician intubated for patient and staff safety. ICU dx: Psychotic episode with extreme agitation requiring sedation protocol and intubation. Overnight uneventful pt sedated on MV. This am sedation held, pt calm and weaned well on CPAP 5 + PS 5; pt extubated. Later as pt became more awake he became agitated, shaking and tried to get out of bed. Multiple members of the ICU team required to hold pt down and pt Rx'd with multiple pushes of Propofol 30mg and Haldol 5mg. Pt calmed down initially and then became agitated again + he put both hands in his mouth pulling his mandible down. Pt started on a Precedex drip and calmed down    Resp: Elevate head/ Place on CO2 monitor  HEME: DVT prophylaxis with sq Heparin  FEN: NPO until calm  Psych: Obtain psych hx/ Recommendations as per Psych consult  Neuro: Doubt pt had Sz   Pt is a 27 yo BM with h/o bipolar disorder, s/p prior suicide attempt, h/o cutting and inpatient stay ~1 year ago who presented to [facility] 10/11 brought in by his friend for "bizarre behavior." Per his friend, pt has recently been stressed and had been smoking marijuana and became noticeably agitated causing his friends to become concerned he was going to have a psychiatric breakdown. Upon arrival tonight, pt fell outside hospital, hit his head and was unable to answer any questions. Prior to this, pt reportedly would not speak or move. While in ER pt reportedly became extremely agitated, thrashing his body around, hitting his head, swinging his arms / legs with code grey called. He was given ketamine IM, however was requiring 4 pt restraints and ER physician intubated for patient and staff safety. ICU dx: Psychotic episode with extreme agitation requiring sedation protocol and intubation. Overnight uneventful pt sedated on MV. This am sedation held, pt calm and weaned well on CPAP 5 + PS 5; pt extubated. Later as pt became more awake he became agitated, shaking and tried to get out of bed. Multiple members of the ICU team required to hold pt down and pt Rx'd with multiple pushes of Propofol 30mg and Haldol 5mg. Pt calmed down initially and then became agitated again + he put both hands in his mouth pulling his mandible down. Pt started on a Precedex drip and calmed down    Resp: Elevate head/ Place on CO2 monitor  HEME: DVT prophylaxis with sq Heparin  FEN: NPO until calm  Psych: Obtain psych hx/ Recommendations as per Psych consult  Neuro: Doubt pt had Sz

## 2023-10-12 NOTE — BH CONSULTATION LIAISON ASSESSMENT NOTE - SUMMARY
psych hx significant for heavy THC use, 1 prior psych admission for self injurious behavior/suicidality in January 2022 (unclear oif Pt was under the influence when he tried to cut his throat), likely not a primary psychotic process as he was only given Abilify 5mg PO qhs and sent to an addiction clinic for follow up indication substance-induced disorder was the main diagnosis. Patient has continued to attend regular therapy and implementing coping techniques. Pt has been experiencing life stressors which some more worry and 1 day of sleeping only 1.5 hours, otherwise was able to give high performance at work the day of admission, during which time THC use was reported. Others who smoked the same weed felt fine and had no odd experiences thus laced substance less likely. Though cannot rule out possibly using other substance outside the view of others. Patient's demeanor, behavior rapidly changed after "just looking at his phone" with collateral assuming Pt got an upsetting text. Overall, entire HPI is less likely to be "just stressed" episode.

## 2023-10-12 NOTE — BH CONSULTATION LIAISON ASSESSMENT NOTE - NSBHCHARTREVIEWVS_PSY_A_CORE FT
Vital Signs Last 24 Hrs  T(C): 37.3 (12 Oct 2023 11:00), Max: 38 (12 Oct 2023 06:30)  T(F): 99.1 (12 Oct 2023 11:00), Max: 100.4 (12 Oct 2023 06:30)  HR: 62 (12 Oct 2023 14:00) (48 - 127)  BP: 127/78 (12 Oct 2023 14:00) (98/62 - 209/118)  BP(mean): 91 (12 Oct 2023 14:00) (74 - 114)  RR: 20 (12 Oct 2023 14:00) (13 - 24)  SpO2: 100% (12 Oct 2023 13:00) (94% - 100%)    Parameters below as of 12 Oct 2023 12:01  Patient On (Oxygen Delivery Method): nasal cannula  O2 Flow (L/min): 3

## 2023-10-12 NOTE — BH CONSULTATION LIAISON ASSESSMENT NOTE - NSBHCHARTREVIEWINVESTIGATE_PSY_A_CORE FT
CT Head No Cont (10.11.23) normal study; CT cervical spine: disc degeneration and spondylosis at C2-3 through C5-6 with mild loss of disc   height. Small LEFT paramedian disc herniation noted at C2-3 which indents the ventral cord.Narrowing of the BILATERAL C3-4, C4-5 neural foramina   due to uncovertebral spurring and facet osteophytic hypertrophy.

## 2023-10-12 NOTE — CHART NOTE - NSCHARTNOTEFT_GEN_A_CORE
COLLATERAL FROM [facility] DATABASE: Patient attended the [facility] for addiction services from 2/5/22-6/9/23  - admission to [facility] 1/19-1/24/2022; was referred to [facility] by [facility] due to ER episode following self-injury considered at the time to be a suicide attempt and cannabis use. At time of intake, Pt. reported hx of cannabis use, beginning at age 23. Prior to ER episode, pt. reported using cannabis daily to cope with psychiatric sx. Pt. reported discontinuing cannabis 'cold turkey' when needed to pass drug tests for work and for tolerance breaks. At [facility], attended [facility] q[facility] & [facility] vanessa, + individual sessions. Pt. participated in medication management of prescribed Abilify 5mg by Dr Enriquez. Pt. continued abstinence from cannabis from admission to June 2022. Pt. reported alcohol use once or twice monthly of 1-2 drinks per occasion of use throughout treatment episode. Pt. discontinued Abilify 5mg in October 2022 with no further presentation of psychiatric symptoms. Chart closed after stopped going.     COLLATERAL FROM PATIENT'S BROTHER Weston Call in person, interviewed privately: Patient has been functioning at baseline but has been experiencing some life stressors including relationships issues. He continues to attend therapy. IN the last week or so, Patient has reached out to him saying he does not want to be alone (no specific SI verbalized general feeling of concern for self), and also reached out to his therapists as learned in coping skills. 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 Reported that they were all smoking the "same weed" and no one had any issues when asked about substance use; thus he does not think anything was laced possibly as no one had a bad reaction. At one point in time, he was looking at his phone and his face changed. His brother at this time asked if all was okay and offered to drive him home (during the ride also had to  his daughter etc before they arrived back home). Patient in the car ride sad nothing and remained quiet, affect constricted and was staring ahead. Once brother dropped off his daughter, Patient started to have a "nervous shake" which later evolved to the whole body. Brother decided to drive him back to [facility] as he was there before but the shaking became so strong that he ended up coming to the [facility] ED [facility]. Patient's whole body was jerking at this time, asymmetrically, no LOC or tongue biting etc, and Patient still remaining silent. Brother is convinced this was not a seizure as when the nurse came out from the ED saying "he will hit his head" Patient started to hit his head against the ground. ED staff had to come out and assist Patient into the ED. Shaking stopped after medications were administered. No previous witnessed such episodes. Pt has no hx of seizures. Brother is convinced this is psychiatric due to stress and asked when Patient can be admitted to psychiatry. (need for medical work up, need for Pt interview etc explained to brother as all required prior to any Pt being admitted to inpt psychiatry etc). COLLATERAL FROM [facility] DATABASE: Patient attended the [facility] for addiction services from 2/5/22-6/9/23  - admission to [facility] 1/19-1/24/2022; was referred to [facility] by [facility] due to ER episode following self-injury considered at the time to be a suicide attempt and cannabis use. At time of intake, Pt. reported hx of cannabis use, beginning at age 23. Prior to ER episode, pt. reported using cannabis daily to cope with psychiatric sx. Pt. reported discontinuing cannabis 'cold turkey' when needed to pass drug tests for work and for tolerance breaks. At [facility], attended [facility] q[facility] & [facility] vanessa, + individual sessions. Pt. participated in medication management of prescribed Abilify 5mg by Dr Enriquez. Pt. continued abstinence from cannabis from admission to June 2022. Pt. reported alcohol use once or twice monthly of 1-2 drinks per occasion of use throughout treatment episode. Pt. discontinued Abilify 5mg in October 2022 with no further presentation of psychiatric symptoms. Chart closed after stopped going.     COLLATERAL FROM PATIENT'S BROTHER Weston Call in person, interviewed privately: Patient has been functioning at baseline but has been experiencing some life stressors including relationships issues. He continues to attend therapy. IN the last week or so, Patient has reached out to him saying he does not want to be alone (no specific SI verbalized general feeling of concern for self), and also reached out to his therapists as learned in coping skills. 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 Reported that they were all smoking the "same weed" and no one had any issues when asked about substance use; thus he does not think anything was laced possibly as no one had a bad reaction. At one point in time, he was looking at his phone and his face changed. His brother at this time asked if all was okay and offered to drive him home (during the ride also had to  his daughter etc before they arrived back home). Patient in the car ride sad nothing and remained quiet, affect constricted and was staring ahead. Once brother dropped off his daughter, Patient started to have a "nervous shake" which later evolved to the whole body. Brother decided to drive him back to [facility] as he was there before but the shaking became so strong that he ended up coming to the nearest ED which was [facility]. Patient's whole body was jerking at this time, asymmetrically, no LOC or tongue biting etc, and Patient still remaining silent. Brother is convinced this was not a seizure as when the nurse came out from the ED saying "he will hit his head" Patient started to hit his head against the ground. ED staff had to come out and assist Patient into the ED. Shaking stopped after medications were administered. No previous witnessed such episodes. Pt has no hx of seizures. Brother is convinced this is psychiatric due to stress and asked when Patient can be admitted to psychiatry. (need for medical work up, need for Pt interview etc explained to brother as all required prior to any Pt being admitted to inpt psychiatry etc). COLLATERAL FROM [facility] DATABASE: Patient attended the [facility] for addiction services from 2/5/22-6/9/23  - admission to [facility] 1/19-1/24/2022; was referred to [facility] by [facility] due to ER episode following self-injury considered at the time to be a suicide attempt and cannabis use. At time of intake, Pt. reported hx of cannabis use, beginning at age 23. Prior to ER episode, pt. reported using cannabis daily to cope with psychiatric sx. Pt. reported discontinuing cannabis 'cold turkey' when needed to pass drug tests for work and for tolerance breaks. At [facility], attended [facility] q[facility] & [facility] vanessa, + individual sessions. Pt. participated in medication management of prescribed Abilify 5mg by Dr Enriquez. Pt. continued abstinence from cannabis from admission to June 2022. Pt. reported alcohol use once or twice monthly of 1-2 drinks per occasion of use throughout treatment episode. Pt. discontinued Abilify 5mg in October 2022 with no further presentation of psychiatric symptoms. Chart closed after stopped going.     COLLATERAL FROM PATIENT'S BROTHER Weston Call in person, interviewed privately: Patient has been functioning at baseline but has been experiencing some life stressors including relationships issues. He continues to attend therapy. IN the last week or so, Patient has reached out to him saying he does not want to be alone (no specific SI verbalized general feeling of concern for self), and also reached out to his therapists as learned in coping skills. 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 Reported that they were all smoking the "same weed" and no one had any issues when asked about substance use; thus he does not think anything was laced possibly as no one had a bad reaction. At one point in time, he was looking at his phone and his face changed. His brother at this time asked if all was okay and offered to drive him home (during the ride also had to  his daughter etc before they arrived back home). Patient in the car ride sad nothing and remained quiet, affect constricted and was staring ahead. Once brother dropped off his daughter, Patient started to have a "nervous shake" which later evolved to the whole body. Brother decided to drive him back to [facility] as he was there before but the shaking became so strong that he ended up coming to the nearest ED which was [facility]. Patient's whole body was jerking at this time, asymmetrically, no LOC or tongue biting etc, and Patient still remaining silent. Brother is convinced this was not a seizure as when the nurse came out from the ED saying "he will hit his head" Patient started to hit his head against the ground. ED staff had to come out and assist Patient into the ED. Shaking stopped after medications were administered. No previous witnessed such episodes. Pt has no hx of seizures. Brother is convinced this is psychiatric due to stress and asked when Patient can be admitted to psychiatry. (need for medical work up, need for Pt interview etc explained to brother as all required prior to any Pt being admitted to inpt psychiatry etc). COLLATERAL FROM [facility] DATABASE: Patient attended the [facility] for addiction services from 2/5/22-6/9/23  - admission to [facility] 1/19-1/24/2022; was referred to [facility] by [facility] due to ER episode following self-injury considered at the time to be a suicide attempt and cannabis use. At time of intake, Pt. reported hx of cannabis use, beginning at age 23. Prior to ER episode, pt. reported using cannabis daily to cope with psychiatric sx. Pt. reported discontinuing cannabis 'cold turkey' when needed to pass drug tests for work and for tolerance breaks. At [facility], attended [facility] q[facility] & [facility] vanessa, + individual sessions. Pt. participated in medication management of prescribed Abilify 5mg by Dr Enriquez. Pt. continued abstinence from cannabis from admission to June 2022. Pt. reported alcohol use once or twice monthly of 1-2 drinks per occasion of use throughout treatment episode. Pt. discontinued Abilify 5mg in October 2022 with no further presentation of psychiatric symptoms. Chart closed after stopped going.     COLLATERAL FROM PATIENT'S BROTHER Weston Call in person, interviewed privately: Patient has been functioning at baseline but has been experiencing some life stressors including relationships issues. He continues to attend therapy. IN the last week or so, Patient has reached out to him saying he does not want to be alone (no specific SI verbalized general feeling of concern for self), and also reached out to his therapists as learned in coping skills. Patient the night before admission slept only 1.5 hours which his brother considered a "warning signs" for possible psychiatric episode. 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 Reported that they were all smoking the "same weed" and no one had any issues when asked about substance use; thus he does not think anything was laced possibly as no one had a bad reaction. At one point in time, he was looking at his phone and his face changed. His brother at this time asked if all was okay and offered to drive him home (during the ride also had to  his daughter etc before they arrived back home). Patient in the car ride sad nothing and remained quiet, affect constricted and was staring ahead. Once brother dropped off his daughter, Patient started to have a "nervous shake" which later evolved to the whole body. Brother decided to drive him back to [facility] as he was there before but the shaking became so strong that he ended up coming to the nearest ED which was [facility]. Patient's whole body was jerking at this time, asymmetrically, no LOC or tongue biting etc, and Patient still remaining silent. Brother is convinced this was not a seizure as when the nurse came out from the ED saying "he will hit his head" Patient started to hit his head against the ground. ED staff had to come out and assist Patient into the ED. Shaking stopped after medications were administered. No previous witnessed such episodes. Pt has no hx of seizures. Brother is convinced this is psychiatric due to stress and asked when Patient can be admitted to psychiatry. (need for medical work up, need for Pt interview etc explained to brother as all required prior to any Pt being admitted to inpt psychiatry etc). COLLATERAL FROM [facility] DATABASE: Patient attended the [facility] for addiction services from 2/5/22-6/9/23  - admission to [facility] 1/19-1/24/2022; was referred to [facility] by [facility] due to ER episode following self-injury considered at the time to be a suicide attempt and cannabis use. At time of intake, Pt. reported hx of cannabis use, beginning at age 23. Prior to ER episode, pt. reported using cannabis daily to cope with psychiatric sx. Pt. reported discontinuing cannabis 'cold turkey' when needed to pass drug tests for work and for tolerance breaks. At [facility], attended [facility] q[facility] & [facility] vanessa, + individual sessions. Pt. participated in medication management of prescribed Abilify 5mg by Dr Enriquez. Pt. continued abstinence from cannabis from admission to June 2022. Pt. reported alcohol use once or twice monthly of 1-2 drinks per occasion of use throughout treatment episode. Pt. discontinued Abilify 5mg in October 2022 with no further presentation of psychiatric symptoms. Chart closed after stopped going.     COLLATERAL FROM PATIENT'S BROTHER Weston Call in person, interviewed privately: Patient has been functioning at baseline but has been experiencing some life stressors including relationships issues. He continues to attend therapy. IN the last week or so, Patient has reached out to him saying he does not want to be alone (no specific SI verbalized general feeling of concern for self), and also reached out to his therapists as learned in coping skills. Patient the night before admission slept only 1.5 hours which his brother considered a "warning signs" for possible psychiatric episode. 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 Reported that they were all smoking the "same weed" and no one had any issues when asked about substance use; thus he does not think anything was laced possibly as no one had a bad reaction. At one point in time, he was looking at his phone and his face changed. His brother at this time asked if all was okay and offered to drive him home (during the ride also had to  his daughter etc before they arrived back home). Patient in the car ride sad nothing and remained quiet, affect constricted and was staring ahead. Once brother dropped off his daughter, Patient started to have a "nervous shake" which later evolved to the whole body. Brother decided to drive him back to [facility] as he was there before but the shaking became so strong that he ended up coming to the nearest ED which was [facility]. Patient's whole body was jerking at this time, asymmetrically, no LOC or tongue biting etc, and Patient still remaining silent. Brother is convinced this was not a seizure as when the nurse came out from the ED saying "he will hit his head" Patient started to hit his head against the ground. ED staff had to come out and assist Patient into the ED. Shaking stopped after medications were administered. No previous witnessed such episodes. Pt has no hx of seizures. Brother is convinced this is psychiatric due to stress and asked when Patient can be admitted to psychiatry. (need for medical work up, need for Pt interview etc explained to brother as all required prior to any Pt being admitted to inpt psychiatry etc). COLLATERAL FROM [facility] DATABASE: Patient attended the [facility] for addiction services from 2/5/22-6/9/23  - admission to [facility] 1/19-1/24/2022; was referred to [facility] by [facility] due to ER episode following self-injury considered at the time to be a suicide attempt and cannabis use. At time of intake, Pt. reported hx of cannabis use, beginning at age 23. Prior to ER episode, pt. reported using cannabis daily to cope with psychiatric sx. Pt. reported discontinuing cannabis 'cold turkey' when needed to pass drug tests for work and for tolerance breaks. At [facility], attended [facility] q[facility] & [facility] vanessa, + individual sessions. Pt. participated in medication management of prescribed Abilify 5mg by Dr Enriquez. Pt. continued abstinence from cannabis from admission to June 2022. Pt. reported alcohol use once or twice monthly of 1-2 drinks per occasion of use throughout treatment episode. Pt. discontinued Abilify 5mg in October 2022 with no further presentation of psychiatric symptoms. Chart closed after stopped going.     COLLATERAL FROM PATIENT'S BROTHER Weston Call in person, interviewed privately: Patient has been functioning at baseline but has been experiencing some life stressors including relationships issues. He continues to attend therapy. IN the last week or so, Patient has reached out to him saying he does not want to be alone (no specific SI verbalized general feeling of concern for self), and also reached out to his therapists as learned in coping skills. Patient the night before admission slept only 1.5 hours which his brother considered a "warning signs" for possible psychiatric episode. 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 Reported that they were all smoking the "same weed" and no one had any issues when asked about substance use; thus he does not think anything was laced possibly as no one had a bad reaction. At one point in time, he was looking at his phone and his face changed. His brother at this time asked if all was okay and offered to drive him home (during the ride also had to  his daughter etc before they arrived back home). Patient in the car ride sad nothing and remained quiet, affect constricted and was staring ahead. Once brother dropped off his daughter, Patient started to have a "nervous shake" which later evolved to the whole body. Brother decided to drive him back to [facility] as he was there before but the shaking became so strong that he ended up coming to the nearest ED which was [facility]. Patient's whole body was jerking at this time, asymmetrically, no LOC or tongue biting etc, and Patient still remaining silent. Brother is convinced this was not a seizure as when the nurse came out from the ED saying "he will hit his head" Patient started to hit his head against the ground. ED staff had to come out and assist Patient into the ED. Shaking stopped after medications were administered. No previous witnessed such episodes. Pt has no hx of seizures. Brother is convinced this is psychiatric due to stress and asked when Patient can be admitted to psychiatry. (need for medical work up, need for Pt interview etc explained to brother as all required prior to any Pt being admitted to inpt psychiatry etc). COLLATERAL FROM [facility] DATABASE: Patient attended the [facility] for addiction services from 2/5/22-6/9/23  - admission to [facility] 1/19-1/24/2022; was referred to [facility] by [facility] due to ER episode following self-injury considered at the time to be a suicide attempt and cannabis use. At time of intake, Pt. reported hx of cannabis use, beginning at age 23. Prior to ER episode, pt. reported using cannabis daily to cope with psychiatric sx. Pt. reported discontinuing cannabis 'cold turkey' when needed to pass drug tests for work and for tolerance breaks. At [facility], attended [facility] q[facility] & [facility] vanessa, + individual sessions. Pt. participated in medication management of prescribed Abilify 5mg by Dr Enriquez. Pt. continued abstinence from cannabis from admission to June 2022. Pt. reported alcohol use once or twice monthly of 1-2 drinks per occasion of use throughout treatment episode. Pt. discontinued Abilify 5mg in October 2022 with no further presentation of psychiatric symptoms. Chart closed after stopped going.     COLLATERAL FROM PATIENT'S FRIEND (INTRODUCED HIMSELF AS PT'S "BROTHER') Weston Call in person, interviewed privately: Patient has been functioning at baseline but has been experiencing some life stressors including relationships issues. He continues to attend therapy. IN the last week or so, Patient has reached out to him saying he does not want to be alone (no specific SI verbalized general feeling of concern for self), and also reached out to his therapists as learned in coping skills. Patient the night before admission slept only 1.5 hours which his brother considered a "warning signs" for possible psychiatric episode. 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 Reported that they were all smoking the "same weed" and no one had any issues when asked about substance use; thus he does not think anything was laced possibly as no one had a bad reaction. At one point in time, he was looking at his phone and his face changed. His brother at this time asked if all was okay and offered to drive him home (during the ride also had to  his daughter etc before they arrived back home). Patient in the car ride sad nothing and remained quiet, affect constricted and was staring ahead. Once brother dropped off his daughter, Patient started to have a "nervous shake" which later evolved to the whole body. Brother decided to drive him back to [facility] as he was there before but the shaking became so strong that he ended up coming to the nearest ED which was [facility]. Patient's whole body was jerking at this time, asymmetrically, no LOC or tongue biting etc, and Patient still remaining silent. Brother is convinced this was not a seizure as when the nurse came out from the ED saying "he will hit his head" Patient started to hit his head against the ground. ED staff had to come out and assist Patient into the ED. Shaking stopped after medications were administered. No previous witnessed such episodes. Pt has no hx of seizures. Brother does not think Pt was suicidal; is convinced this is psychiatric due to stress and asked when Patient can be admitted to psychiatry. (need for medical work up, need for Pt interview etc explained to brother as all required prior to any Pt being admitted to inpt psychiatry etc). COLLATERAL FROM [facility] DATABASE: Patient attended the [facility] for addiction services from 2/5/22-6/9/23  - admission to [facility] 1/19-1/24/2022; was referred to [facility] [facility] due to ER episode following self-injury considered at the time to be a suicide attempt and cannabis use. At time of intake, Pt. reported hx of cannabis use, beginning at age 23. Prior to ER episode, pt. reported using cannabis daily to cope with psychiatric sx. Pt. reported discontinuing cannabis 'cold turkey' when needed to pass drug tests for work and for tolerance breaks. At [facility], attended [facility] q[facility] & [facility], + individual sessions. Pt. participated in medication management of prescribed Abilify 5mg by Dr Enriquez. Pt. continued abstinence from cannabis from admission to June 2022. Pt. reported alcohol use once or twice monthly of 1-2 drinks per occasion of use throughout treatment episode. Pt. discontinued Abilify 5mg in October 2022 with no further presentation of psychiatric symptoms. Chart closed after stopped going.     COLLATERAL FROM PATIENT'S FRIEND (INTRODUCED HIMSELF AS PT'S "BROTHER') Weston Call in person, interviewed privately: Patient has been functioning at baseline but has been experiencing some life stressors including relationships issues. He continues to attend therapy. IN the last week or so, Patient has reached out to him saying he does not want to be alone (no specific SI verbalized general feeling of concern for self), and also reached out to his therapists as learned in coping skills. Patient the night before admission slept only 1.5 hours which his brother considered a "warning signs" for possible psychiatric episode. 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 Reported that they were all smoking the "same weed" and no one had any issues when asked about substance use; thus he does not think anything was laced possibly as no one had a bad reaction. At one point in time, he was looking at his phone and his face changed. His brother at this time asked if all was okay and offered to drive him home (during the ride also had to  his daughter etc before they arrived back home). Patient in the car ride sad nothing and remained quiet, affect constricted and was staring ahead. Once brother dropped off his daughter, Patient started to have a "nervous shake" which later evolved to the whole body. Brother decided to drive him back to [facility] as he was there before but the shaking became so strong that he ended up coming to the [facility] ED which was [facility]. Patient's whole body was jerking at this time, asymmetrically, no LOC or tongue biting etc, and Patient still remaining silent. Brother is convinced this was not a seizure as when the nurse came out from the ED saying "he will hit his head" Patient started to hit his head against the ground. ED staff had to come out and assist Patient into the ED. Shaking stopped after medications were administered. No previous witnessed such episodes. Pt has no hx of seizures. Brother does not think Pt was suicidal; is convinced this is psychiatric due to stress and asked when Patient can be admitted to psychiatry. (need for medical work up, need for Pt interview etc explained to brother as all required prior to any Pt being admitted to inpt psychiatry etc). COLLATERAL FROM [facility] DATABASE: Patient attended the [facility] for addiction services from 2/5/22-6/9/23  - admission to [facility] 1/19-1/24/2022; was referred to [facility] by [facility] due to ER episode following self-injury considered at the time to be a suicide attempt and cannabis use. At time of intake, Pt. reported hx of cannabis use, beginning at age 23. Prior to ER episode, pt. reported using cannabis daily to cope with psychiatric sx. Pt. reported discontinuing cannabis 'cold turkey' when needed to pass drug tests for work and for tolerance breaks. At [facility], attended [facility] & [facility], + individual sessions. Pt. participated in medication management of prescribed Abilify 5mg by Dr Enriquez. Pt. continued abstinence from cannabis from admission to June 2022. Pt. reported alcohol use once or twice monthly of 1-2 drinks per occasion of use throughout treatment episode. Pt. discontinued Abilify 5mg in October 2022 with no further presentation of psychiatric symptoms. Chart closed after stopped going.     COLLATERAL FROM PATIENT'S FRIEND (INTRODUCED HIMSELF AS PT'S "BROTHER') Weston Call in person, interviewed privately: Patient has been functioning at baseline but has been experiencing some life stressors including relationships issues. He continues to attend therapy. IN the last week or so, Patient has reached out to him saying he does not want to be alone (no specific SI verbalized general feeling of concern for self), and also reached out to his therapists as learned in coping skills. Patient the night before admission slept only 1.5 hours which his brother considered a "warning signs" for possible psychiatric episode. 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 Reported that they were all smoking the "same weed" and no one had any issues when asked about substance use; thus he does not think anything was laced possibly as no one had a bad reaction. At one point in time, he was looking at his phone and his face changed. His brother at this time asked if all was okay and offered to drive him home (during the ride also had to  his daughter etc before they arrived back home). Patient in the car ride sad nothing and remained quiet, affect constricted and was staring ahead. Once brother dropped off his daughter, Patient started to have a "nervous shake" which later evolved to the whole body. Brother decided to drive him back to [facility] as he was there before but the shaking became so strong that he ended up coming to the nearest ED which was [facility]. Patient's whole body was jerking at this time, asymmetrically, no LOC or tongue biting etc, and Patient still remaining silent. Brother is convinced this was not a seizure as when the nurse came out from the ED saying "he will hit his head" Patient started to hit his head against the ground. ED staff had to come out and assist Patient into the ED. Shaking stopped after medications were administered. No previous witnessed such episodes. Pt has no hx of seizures. Brother does not think Pt was suicidal; is convinced this is psychiatric due to stress and asked when Patient can be admitted to psychiatry. (need for medical work up, need for Pt interview etc explained to brother as all required prior to any Pt being admitted to inpt psychiatry etc).

## 2023-10-12 NOTE — BH CONSULTATION LIAISON ASSESSMENT NOTE - CURRENT MEDICATION
MEDICATIONS  (STANDING):  chlorhexidine 2% Cloths 1 Application(s) Topical <User Schedule>  dexMEDEtomidine Infusion 0.3 MICROgram(s)/kG/Hr (6.12 mL/Hr) IV Continuous <Continuous>  heparin   Injectable 5000 Unit(s) SubCutaneous every 8 hours  influenza   Vaccine 0.5 milliLiter(s) IntraMuscular once  pantoprazole  Injectable 40 milliGRAM(s) IV Push daily  propofol Infusion 10 MICROgram(s)/kG/Min (4.8 mL/Hr) IV Continuous <Continuous>    MEDICATIONS  (PRN):

## 2023-10-12 NOTE — BH CONSULTATION LIAISON ASSESSMENT NOTE - DETAILS
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 Witnessed father being murdered in front of him when he was 6 years old.

## 2023-10-13 LAB
ALBUMIN SERPL ELPH-MCNC: 3.1 G/DL — LOW (ref 3.3–5)
ALP SERPL-CCNC: 62 U/L — SIGNIFICANT CHANGE UP (ref 40–120)
ALT FLD-CCNC: 28 U/L — SIGNIFICANT CHANGE UP (ref 12–78)
ANION GAP SERPL CALC-SCNC: 8 MMOL/L — SIGNIFICANT CHANGE UP (ref 5–17)
AST SERPL-CCNC: 43 U/L — HIGH (ref 15–37)
BASOPHILS # BLD AUTO: 0.06 K/UL — SIGNIFICANT CHANGE UP (ref 0–0.2)
BASOPHILS NFR BLD AUTO: 0.7 % — SIGNIFICANT CHANGE UP (ref 0–2)
BILIRUB SERPL-MCNC: 1.2 MG/DL — SIGNIFICANT CHANGE UP (ref 0.2–1.2)
BUN SERPL-MCNC: 10 MG/DL — SIGNIFICANT CHANGE UP (ref 7–23)
CALCIUM SERPL-MCNC: 8.1 MG/DL — LOW (ref 8.5–10.1)
CHLORIDE SERPL-SCNC: 111 MMOL/L — HIGH (ref 96–108)
CK SERPL-CCNC: 1343 U/L — HIGH (ref 26–308)
CO2 SERPL-SCNC: 26 MMOL/L — SIGNIFICANT CHANGE UP (ref 22–31)
CREAT SERPL-MCNC: 1.04 MG/DL — SIGNIFICANT CHANGE UP (ref 0.5–1.3)
EGFR: 99 ML/MIN/1.73M2 — SIGNIFICANT CHANGE UP
EOSINOPHIL # BLD AUTO: 0.09 K/UL — SIGNIFICANT CHANGE UP (ref 0–0.5)
EOSINOPHIL NFR BLD AUTO: 1 % — SIGNIFICANT CHANGE UP (ref 0–6)
GLUCOSE SERPL-MCNC: 99 MG/DL — SIGNIFICANT CHANGE UP (ref 70–99)
HCT VFR BLD CALC: 42.3 % — SIGNIFICANT CHANGE UP (ref 39–50)
HGB BLD-MCNC: 12.8 G/DL — LOW (ref 13–17)
IMM GRANULOCYTES NFR BLD AUTO: 0.2 % — SIGNIFICANT CHANGE UP (ref 0–0.9)
LYMPHOCYTES # BLD AUTO: 1.12 K/UL — SIGNIFICANT CHANGE UP (ref 1–3.3)
LYMPHOCYTES # BLD AUTO: 12.4 % — LOW (ref 13–44)
MAGNESIUM SERPL-MCNC: 2.1 MG/DL — SIGNIFICANT CHANGE UP (ref 1.6–2.6)
MCHC RBC-ENTMCNC: 22.3 PG — LOW (ref 27–34)
MCHC RBC-ENTMCNC: 30.3 G/DL — LOW (ref 32–36)
MCV RBC AUTO: 73.8 FL — LOW (ref 80–100)
MONOCYTES # BLD AUTO: 0.76 K/UL — SIGNIFICANT CHANGE UP (ref 0–0.9)
MONOCYTES NFR BLD AUTO: 8.4 % — SIGNIFICANT CHANGE UP (ref 2–14)
NEUTROPHILS # BLD AUTO: 6.99 K/UL — SIGNIFICANT CHANGE UP (ref 1.8–7.4)
NEUTROPHILS NFR BLD AUTO: 77.3 % — HIGH (ref 43–77)
NRBC # BLD: 0 /100 WBCS — SIGNIFICANT CHANGE UP (ref 0–0)
PHOSPHATE SERPL-MCNC: 3.8 MG/DL — SIGNIFICANT CHANGE UP (ref 2.5–4.5)
PLATELET # BLD AUTO: 171 K/UL — SIGNIFICANT CHANGE UP (ref 150–400)
POTASSIUM SERPL-MCNC: 4.2 MMOL/L — SIGNIFICANT CHANGE UP (ref 3.5–5.3)
POTASSIUM SERPL-SCNC: 4.2 MMOL/L — SIGNIFICANT CHANGE UP (ref 3.5–5.3)
PROT SERPL-MCNC: 6.8 GM/DL — SIGNIFICANT CHANGE UP (ref 6–8.3)
RBC # BLD: 5.73 M/UL — SIGNIFICANT CHANGE UP (ref 4.2–5.8)
RBC # FLD: 17.8 % — HIGH (ref 10.3–14.5)
SODIUM SERPL-SCNC: 145 MMOL/L — SIGNIFICANT CHANGE UP (ref 135–145)
WBC # BLD: 9.04 K/UL — SIGNIFICANT CHANGE UP (ref 3.8–10.5)
WBC # FLD AUTO: 9.04 K/UL — SIGNIFICANT CHANGE UP (ref 3.8–10.5)

## 2023-10-13 PROCEDURE — 99231 SBSQ HOSP IP/OBS SF/LOW 25: CPT

## 2023-10-13 PROCEDURE — 99291 CRITICAL CARE FIRST HOUR: CPT

## 2023-10-13 RX ORDER — ENOXAPARIN SODIUM 100 MG/ML
40 INJECTION SUBCUTANEOUS EVERY 24 HOURS
Refills: 0 | Status: DISCONTINUED | OUTPATIENT
Start: 2023-10-13 | End: 2023-10-17

## 2023-10-13 RX ORDER — DIPHENHYDRAMINE HCL 50 MG
50 CAPSULE ORAL ONCE
Refills: 0 | Status: COMPLETED | OUTPATIENT
Start: 2023-10-13 | End: 2023-10-13

## 2023-10-13 RX ORDER — SODIUM CHLORIDE 9 MG/ML
1000 INJECTION, SOLUTION INTRAVENOUS
Refills: 0 | Status: DISCONTINUED | OUTPATIENT
Start: 2023-10-13 | End: 2023-10-14

## 2023-10-13 RX ADMIN — PANTOPRAZOLE SODIUM 40 MILLIGRAM(S): 20 TABLET, DELAYED RELEASE ORAL at 11:19

## 2023-10-13 RX ADMIN — HEPARIN SODIUM 5000 UNIT(S): 5000 INJECTION INTRAVENOUS; SUBCUTANEOUS at 05:06

## 2023-10-13 RX ADMIN — DEXMEDETOMIDINE HYDROCHLORIDE IN 0.9% SODIUM CHLORIDE 30.6 MICROGRAM(S)/KG/HR: 4 INJECTION INTRAVENOUS at 05:06

## 2023-10-13 RX ADMIN — CHLORHEXIDINE GLUCONATE 1 APPLICATION(S): 213 SOLUTION TOPICAL at 05:06

## 2023-10-13 RX ADMIN — MIDAZOLAM HYDROCHLORIDE 2 MILLIGRAM(S): 1 INJECTION, SOLUTION INTRAMUSCULAR; INTRAVENOUS at 07:46

## 2023-10-13 RX ADMIN — DEXMEDETOMIDINE HYDROCHLORIDE IN 0.9% SODIUM CHLORIDE 30.6 MICROGRAM(S)/KG/HR: 4 INJECTION INTRAVENOUS at 11:18

## 2023-10-13 RX ADMIN — SODIUM CHLORIDE 100 MILLILITER(S): 9 INJECTION, SOLUTION INTRAVENOUS at 04:00

## 2023-10-13 RX ADMIN — MIDAZOLAM HYDROCHLORIDE 2 MILLIGRAM(S): 1 INJECTION, SOLUTION INTRAMUSCULAR; INTRAVENOUS at 21:05

## 2023-10-13 RX ADMIN — DEXMEDETOMIDINE HYDROCHLORIDE IN 0.9% SODIUM CHLORIDE 30.6 MICROGRAM(S)/KG/HR: 4 INJECTION INTRAVENOUS at 07:02

## 2023-10-13 RX ADMIN — DEXMEDETOMIDINE HYDROCHLORIDE IN 0.9% SODIUM CHLORIDE 30.6 MICROGRAM(S)/KG/HR: 4 INJECTION INTRAVENOUS at 00:43

## 2023-10-13 RX ADMIN — SODIUM CHLORIDE 50 MILLILITER(S): 9 INJECTION, SOLUTION INTRAVENOUS at 17:16

## 2023-10-13 RX ADMIN — Medication 50 MILLIGRAM(S): at 07:46

## 2023-10-13 RX ADMIN — HALOPERIDOL DECANOATE 5 MILLIGRAM(S): 100 INJECTION INTRAMUSCULAR at 07:46

## 2023-10-13 RX ADMIN — DEXMEDETOMIDINE HYDROCHLORIDE IN 0.9% SODIUM CHLORIDE 30.6 MICROGRAM(S)/KG/HR: 4 INJECTION INTRAVENOUS at 02:42

## 2023-10-13 RX ADMIN — ENOXAPARIN SODIUM 40 MILLIGRAM(S): 100 INJECTION SUBCUTANEOUS at 11:18

## 2023-10-13 NOTE — DIETITIAN INITIAL EVALUATION ADULT - PERTINENT LABORATORY DATA
10-13    145  |  111<H>  |  10  ----------------------------<  99  4.2   |  26  |  1.04    Ca    8.1<L>      13 Oct 2023 02:35  Phos  3.8     10-13  Mg     2.1     10-13    TPro  6.8  /  Alb  3.1<L>  /  TBili  1.2  /  DBili  x   /  AST  43<H>  /  ALT  28  /  AlkPhos  62  10-13

## 2023-10-13 NOTE — CONSULT NOTE ADULT - SUBJECTIVE AND OBJECTIVE BOX
Neurology consult    SHELBIE ARIZA28yMale     Patient is a 28y old  Male who presents with a chief complaint of Severe agitation requiring intubation for patient and staff safety, Psychosis (13 Oct 2023 08:07)      HPI:  Mr. Ariza is a 28 year old male with a PMH of bipolar disorder, s/p prior suicide attempt, h/o cutting and inpatient stay ~1 year ago who presented to [facility] ED earlier today brought in by his friend for "bizarre behavior." Per his friend, pt has recently been stressed and had been smoking marijuana and became noticeably agitated causing his friends to become concerned he was going to have a psychiatric breakdown. Upon arrival tonight, pt fell outside hospital, hit his head and was unable to answer any questions. Prior to this, pt reportedly would not speak or move. While in ED, pt reportedly became extremely agitated, thrashing his body around, hitting his head, swinging his arms / legs with code grey called. He was given ketamine IM, however was requiring 4 pt restraints and ED physician intubated for patient and staff safety. ICU team consulted.    CT head negative, but CT spine noting Cervical intervertebral disc spaces show disc degeneration and spondylosis at C2-3 through C5-6 with mild lossof disc height. Small LEFT paramedian disc herniation noted at C2-3 which indents the ventral cord. Narrowing of the BILATERAL C3-4, C4-5 neural foramina due to uncovertebral spurring and facet osteophytic hypertrophy. ED PA d/w ortho / spine with no immediate intervention required.       (11 Oct 2023 21:17)    MEDICATIONS    chlorhexidine 2% Cloths 1 Application(s) Topical <User Schedule>  dexMEDEtomidine Infusion 1.5 MICROgram(s)/kG/Hr IV Continuous <Continuous>  enoxaparin Injectable 40 milliGRAM(s) SubCutaneous every 24 hours  haloperidol    Injectable 5 milliGRAM(s) IntraMuscular every 6 hours PRN  influenza   Vaccine 0.5 milliLiter(s) IntraMuscular once  lactated ringers. 1000 milliLiter(s) IV Continuous <Continuous>  midazolam Injectable 2 milliGRAM(s) IV Push every 4 hours PRN  pantoprazole  Injectable 40 milliGRAM(s) IV Push daily      PMH: Bipolar disorder    Suicide attempt         PSH:     Family history: No history of dementia, strokes, or seizures   FAMILY HISTORY:      SOCIAL HISTORY:  No history of tobacco or alcohol use     Allergies    Allergy Status Unknown    Intolerances            Vital Signs Last 24 Hrs  T(C): 36.3 (13 Oct 2023 07:20), Max: 37.8 (13 Oct 2023 00:00)  T(F): 97.3 (13 Oct 2023 07:20), Max: 100 (13 Oct 2023 00:00)  HR: 57 (13 Oct 2023 11:00) (44 - 112)  BP: 132/84 (13 Oct 2023 11:00) (120/74 - 166/97)  BP(mean): 96 (13 Oct 2023 11:00) (88 - 126)  RR: 19 (13 Oct 2023 11:00) (7 - 26)  SpO2: 100% (13 Oct 2023 11:00) (96% - 100%)    Parameters below as of 12 Oct 2023 12:01  Patient On (Oxygen Delivery Method): nasal cannula  O2 Flow (L/min): 3        On Neurological Examination:    Mental Status - sedated    Cranial Nerves - PERRL, EOMI, VFF, V1-V3 intact, no gross facial asymmetry,     rest sedated can not examine      LABS:  CBC Full  -  ( 13 Oct 2023 02:35 )  WBC Count : 9.04 K/uL  RBC Count : 5.73 M/uL  Hemoglobin : 12.8 g/dL  Hematocrit : 42.3 %  Platelet Count - Automated : 171 K/uL  Mean Cell Volume : 73.8 fl  Mean Cell Hemoglobin : 22.3 pg  Mean Cell Hemoglobin Concentration : 30.3 g/dL  Auto Neutrophil # : 6.99 K/uL  Auto Lymphocyte # : 1.12 K/uL  Auto Monocyte # : 0.76 K/uL  Auto Eosinophil # : 0.09 K/uL  Auto Basophil # : 0.06 K/uL  Auto Neutrophil % : 77.3 %  Auto Lymphocyte % : 12.4 %  Auto Monocyte % : 8.4 %  Auto Eosinophil % : 1.0 %  Auto Basophil % : 0.7 %    Urinalysis Basic - ( 13 Oct 2023 02:35 )    Color: x / Appearance: x / SG: x / pH: x  Gluc: 99 mg/dL / Ketone: x  / Bili: x / Urobili: x   Blood: x / Protein: x / Nitrite: x   Leuk Esterase: x / RBC: x / WBC x   Sq Epi: x / Non Sq Epi: x / Bacteria: x      10-13    145  |  111<H>  |  10  ----------------------------<  99  4.2   |  26  |  1.04    Ca    8.1<L>      13 Oct 2023 02:35  Phos  3.8     10-13  Mg     2.1     10-13    TPro  6.8  /  Alb  3.1<L>  /  TBili  1.2  /  DBili  x   /  AST  43<H>  /  ALT  28  /  AlkPhos  62  10-13    LIVER FUNCTIONS - ( 13 Oct 2023 02:35 )  Alb: 3.1 g/dL / Pro: 6.8 gm/dL / ALK PHOS: 62 U/L / ALT: 28 U/L / AST: 43 U/L / GGT: x           Hemoglobin A1C:             RADIOLOGY  < from: CT Head No Cont (10.11.23 @ 18:24) >    IMPRESSION:    CT HEAD: No acute abnormality.    Abundant secretions in the nasal cavity   andnasopharynx.    CT cervical spine:   No vertebral fracture is recognized.  Disc   degeneration and spondylosis at C2-3 through C5-6 with mild loss of disc   height. Small LEFT paramedian disc herniation noted at C2-3 which indents   the ventral cord.Narrowing of the BILATERAL C3-4, C4-5 neural foramina   due to uncovertebral spurring and facet osteophytic hypertrophy.    --- End of Report ---    < end of copied text >                   Neurology consult    SHELBIE [id number]     Patient is a 28y old  Male who presents with a chief complaint of Severe agitation requiring intubation for patient and staff safety, Psychosis (13 Oct 2023 08:07)      HPI:  Mr. Ariza is a 28 year old male with a PMH of bipolar disorder, s/p prior suicide attempt, h/o cutting and inpatient stay ~1 year ago who presented to [facility] ED earlier today brought in by his friend for "bizarre behavior." Per his friend, pt has recently been stressed and had been smoking marijuana and became noticeably agitated causing his friends to become concerned he was going to have a psychiatric breakdown. Upon arrival tonight, pt fell outside hospital, hit his head and was unable to answer any questions. Prior to this, pt reportedly would not speak or move. While in ED, pt reportedly became extremely agitated, thrashing his body around, hitting his head, swinging his arms / legs with code grey called. He was given ketamine IM, however was requiring 4 pt restraints and ED physician intubated for patient and staff safety. ICU team consulted.    CT head negative, but CT spine noting Cervical intervertebral disc spaces show disc degeneration and spondylosis at C2-3 through C5-6 with mild lossof disc height. Small LEFT paramedian disc herniation noted at C2-3 which indents the ventral cord. Narrowing of the BILATERAL C3-4, C4-5 neural foramina due to uncovertebral spurring and facet osteophytic hypertrophy. ED PA d/w ortho / spine with no immediate intervention required.       (11 Oct 2023 21:17)    MEDICATIONS    chlorhexidine 2% Cloths 1 Application(s) Topical <User Schedule>  dexMEDEtomidine Infusion 1.5 MICROgram(s)/kG/Hr IV Continuous <Continuous>  enoxaparin Injectable 40 milliGRAM(s) SubCutaneous every 24 hours  haloperidol    Injectable 5 milliGRAM(s) IntraMuscular every 6 hours PRN  influenza   Vaccine 0.5 milliLiter(s) IntraMuscular once  lactated ringers. 1000 milliLiter(s) IV Continuous <Continuous>  midazolam Injectable 2 milliGRAM(s) IV Push every 4 hours PRN  pantoprazole  Injectable 40 milliGRAM(s) IV Push daily      PMH: Bipolar disorder    Suicide attempt         PSH:     Family history: No history of dementia, strokes, or seizures   FAMILY HISTORY:      SOCIAL HISTORY:  No history of tobacco or alcohol use     Allergies    Allergy Status Unknown    Intolerances            Vital Signs Last 24 Hrs  T(C): 36.3 (13 Oct 2023 07:20), Max: 37.8 (13 Oct 2023 00:00)  T(F): 97.3 (13 Oct 2023 07:20), Max: 100 (13 Oct 2023 00:00)  HR: 57 (13 Oct 2023 11:00) (44 - 112)  BP: 132/84 (13 Oct 2023 11:00) (120/74 - 166/97)  BP(mean): 96 (13 Oct 2023 11:00) (88 - 126)  RR: 19 (13 Oct 2023 11:00) (7 - 26)  SpO2: 100% (13 Oct 2023 11:00) (96% - 100%)    Parameters below as of 12 Oct 2023 12:01  Patient On (Oxygen Delivery Method): nasal cannula  O2 Flow (L/min): 3        On Neurological Examination:    Mental Status - sedated    Cranial Nerves - PERRL, EOMI, VFF, V1-V3 intact, no gross facial asymmetry,     rest sedated can not examine      LABS:  CBC Full  -  ( 13 Oct 2023 02:35 )  WBC Count : 9.04 K/uL  RBC Count : 5.73 M/uL  Hemoglobin : 12.8 g/dL  Hematocrit : 42.3 %  Platelet Count - Automated : 171 K/uL  Mean Cell Volume : 73.8 fl  Mean Cell Hemoglobin : 22.3 pg  Mean Cell Hemoglobin Concentration : 30.3 g/dL  Auto Neutrophil # : 6.99 K/uL  Auto Lymphocyte # : 1.12 K/uL  Auto Monocyte # : 0.76 K/uL  Auto Eosinophil # : 0.09 K/uL  Auto Basophil # : 0.06 K/uL  Auto Neutrophil % : 77.3 %  Auto Lymphocyte % : 12.4 %  Auto Monocyte % : 8.4 %  Auto Eosinophil % : 1.0 %  Auto Basophil % : 0.7 %    Urinalysis Basic - ( 13 Oct 2023 02:35 )    Color: x / Appearance: x / SG: x / pH: x  Gluc: 99 mg/dL / Ketone: x  / Bili: x / Urobili: x   Blood: x / Protein: x / Nitrite: x   Leuk Esterase: x / RBC: x / WBC x   Sq Epi: x / Non Sq Epi: x / Bacteria: x      10-13    145  |  111<H>  |  10  ----------------------------<  99  4.2   |  26  |  1.04    Ca    8.1<L>      13 Oct 2023 02:35  Phos  3.8     10-13  Mg     2.1     10-13    TPro  6.8  /  Alb  3.1<L>  /  TBili  1.2  /  DBili  x   /  AST  43<H>  /  ALT  28  /  AlkPhos  62  10-13    LIVER FUNCTIONS - ( 13 Oct 2023 02:35 )  Alb: 3.1 g/dL / Pro: 6.8 gm/dL / ALK PHOS: 62 U/L / ALT: 28 U/L / AST: 43 U/L / GGT: x           Hemoglobin A1C:             RADIOLOGY  < from: CT Head No Cont (10.11.23 @ 18:24) >    IMPRESSION:    CT HEAD: No acute abnormality.    Abundant secretions in the nasal cavity   andnasopharynx.    CT cervical spine:   No vertebral fracture is recognized.  Disc   degeneration and spondylosis at C2-3 through C5-6 with mild loss of disc   height. Small LEFT paramedian disc herniation noted at C2-3 which indents   the ventral cord.Narrowing of the BILATERAL C3-4, C4-5 neural foramina   due to uncovertebral spurring and facet osteophytic hypertrophy.    --- End of Report ---    < end of copied text >                   Neurology consult    SHELBIE [id number]     Patient is a 28y old  Male who presents with a chief complaint of Severe agitation requiring intubation for patient and staff safety, Psychosis (13 Oct 2023 08:07)      HPI:  Mr. Ariza is a 28 year old male with a PMH of bipolar disorder, s/p prior suicide attempt, h/o cutting and inpatient stay ~1 year ago who presented to [facility] ED earlier today brought in by his friend for "bizarre behavior." Per his friend, pt has recently been stressed and had been smoking marijuana and became noticeably agitated causing his friends to become concerned he was going to have a psychiatric breakdown. Upon arrival tonight, pt fell outside hospital, hit his head and was unable to answer any questions. Prior to this, pt reportedly would not speak or move. While in ED, pt reportedly became extremely agitated, thrashing his body around, hitting his head, swinging his arms / legs with code grey called. He was given ketamine IM, however was requiring 4 pt restraints and ED physician intubated for patient and staff safety. ICU team consulted.    CT head negative, but CT spine noting Cervical intervertebral disc spaces show disc degeneration and spondylosis at C2-3 through C5-6 with mild lossof disc height. Small LEFT paramedian disc herniation noted at C2-3 which indents the ventral cord. Narrowing of the BILATERAL C3-4, C4-5 neural foramina due to uncovertebral spurring and facet osteophytic hypertrophy. ED PA d/w ortho / spine with no immediate intervention required.       (11 Oct 2023 21:17)    MEDICATIONS    chlorhexidine 2% Cloths 1 Application(s) Topical <User Schedule>  dexMEDEtomidine Infusion 1.5 MICROgram(s)/kG/Hr IV Continuous <Continuous>  enoxaparin Injectable 40 milliGRAM(s) SubCutaneous every 24 hours  haloperidol    Injectable 5 milliGRAM(s) IntraMuscular every 6 hours PRN  influenza   Vaccine 0.5 milliLiter(s) IntraMuscular once  lactated ringers. 1000 milliLiter(s) IV Continuous <Continuous>  midazolam Injectable 2 milliGRAM(s) IV Push every 4 hours PRN  pantoprazole  Injectable 40 milliGRAM(s) IV Push daily      PMH: Bipolar disorder    Suicide attempt         PSH:     Family history: No history of dementia, strokes, or seizures   FAMILY HISTORY:      SOCIAL HISTORY:  No history of tobacco or alcohol use     Allergies    Allergy Status Unknown    Intolerances            Vital Signs Last 24 Hrs  T(C): 36.3 (13 Oct 2023 07:20), Max: 37.8 (13 Oct 2023 00:00)  T(F): 97.3 (13 Oct 2023 07:20), Max: 100 (13 Oct 2023 00:00)  HR: 57 (13 Oct 2023 11:00) (44 - 112)  BP: 132/84 (13 Oct 2023 11:00) (120/74 - 166/97)  BP(mean): 96 (13 Oct 2023 11:00) (88 - 126)  RR: 19 (13 Oct 2023 11:00) (7 - 26)  SpO2: 100% (13 Oct 2023 11:00) (96% - 100%)    Parameters below as of 12 Oct 2023 12:01  Patient On (Oxygen Delivery Method): nasal cannula  O2 Flow (L/min): 3        On Neurological Examination:    Mental Status - sedated    Cranial Nerves - PERRL, EOMI, VFF, V1-V3 intact, no gross facial asymmetry,     rest sedated can not examine      LABS:  CBC Full  -  ( 13 Oct 2023 02:35 )  WBC Count : 9.04 K/uL  RBC Count : 5.73 M/uL  Hemoglobin : 12.8 g/dL  Hematocrit : 42.3 %  Platelet Count - Automated : 171 K/uL  Mean Cell Volume : 73.8 fl  Mean Cell Hemoglobin : 22.3 pg  Mean Cell Hemoglobin Concentration : 30.3 g/dL  Auto Neutrophil # : 6.99 K/uL  Auto Lymphocyte # : 1.12 K/uL  Auto Monocyte # : 0.76 K/uL  Auto Eosinophil # : 0.09 K/uL  Auto Basophil # : 0.06 K/uL  Auto Neutrophil % : 77.3 %  Auto Lymphocyte % : 12.4 %  Auto Monocyte % : 8.4 %  Auto Eosinophil % : 1.0 %  Auto Basophil % : 0.7 %    Urinalysis Basic - ( 13 Oct 2023 02:35 )    Color: x / Appearance: x / SG: x / pH: x  Gluc: 99 mg/dL / Ketone: x  / Bili: x / Urobili: x   Blood: x / Protein: x / Nitrite: x   Leuk Esterase: x / RBC: x / WBC x   Sq Epi: x / Non Sq Epi: x / Bacteria: x      10-13    145  |  111<H>  |  10  ----------------------------<  99  4.2   |  26  |  1.04    Ca    8.1<L>      13 Oct 2023 02:35  Phos  3.8     10-13  Mg     2.1     10-13    TPro  6.8  /  Alb  3.1<L>  /  TBili  1.2  /  DBili  x   /  AST  43<H>  /  ALT  28  /  AlkPhos  62  10-13    LIVER FUNCTIONS - ( 13 Oct 2023 02:35 )  Alb: 3.1 g/dL / Pro: 6.8 gm/dL / ALK PHOS: 62 U/L / ALT: 28 U/L / AST: 43 U/L / GGT: x           Hemoglobin A1C:             RADIOLOGY  < from: CT Head No Cont (10.11.23 @ 18:24) >    IMPRESSION:    CT HEAD: No acute abnormality.    Abundant secretions in the nasal cavity   andnasopharynx.    CT cervical spine:   No vertebral fracture is recognized.  Disc   degeneration and spondylosis at C2-3 through C5-6 with mild loss of disc   height. Small LEFT paramedian disc herniation noted at C2-3 which indents   the ventral cord.Narrowing of the BILATERAL C3-4, C4-5 neural foramina   due to uncovertebral spurring and facet osteophytic hypertrophy.    --- End of Report ---    < end of copied text >

## 2023-10-13 NOTE — PROGRESS NOTE ADULT - SUBJECTIVE AND OBJECTIVE BOX
CHIEF COMPLAINT:    Interval Events:    REVIEW OF SYSTEMS:  Constitutional: [ ] fevers [ ] chills [ ] weight loss [ ] weight gain  HEENT: [ ] dry eyes [ ] eye irritation [ ] postnasal drip [ ] nasal congestion  CV: [ ] chest pain [ ] orthopnea [ ] palpitations [ ] murmur  Resp: [ ] cough [ ] shortness of breath [ ] dyspnea [ ] wheezing [ ] sputum [ ] hemoptysis  GI: [ ] nausea [ ] vomiting [ ] diarrhea [ ] constipation [ ] abd pain [ ] dysphagia   : [ ] dysuria [ ] nocturia [ ] hematuria [ ] increased urinary frequency  Musculoskeletal: [ ] back pain [ ] myalgias [ ] arthralgias [ ] fracture  Skin: [ ] rash [ ] itch  Neurological: [ ] headache [ ] dizziness [ ] syncope [ ] weakness [ ] numbness  Hematologic/Lymphatic: [ ] anemia [ ] bleeding problem  Allergic/Immunologic: [ ] itchy eyes [ ] nasal discharge [ ] hives [ ] angioedema  [ ] All other systems negative  [ ] Unable to assess ROS because ________    OBJECTIVE:  ICU Vital Signs Last 24 Hrs  T(C): 36.3 (13 Oct 2023 07:20), Max: 37.8 (13 Oct 2023 00:00)  T(F): 97.3 (13 Oct 2023 07:20), Max: 100 (13 Oct 2023 00:00)  HR: 76 (13 Oct 2023 07:20) (44 - 112)  BP: 139/93 (13 Oct 2023 07:00) (120/74 - 166/97)  BP(mean): 106 (13 Oct 2023 07:00) (88 - 126)  ABP: --  ABP(mean): --  RR: 7 (13 Oct 2023 07:20) (7 - 26)  SpO2: 100% (13 Oct 2023 07:20) (96% - 100%)    O2 Parameters below as of 12 Oct 2023 12:01  Patient On (Oxygen Delivery Method): nasal cannula  O2 Flow (L/min): 3        Mode: CPAP with PS, FiO2: 40, PEEP: 5, PS: 5, ITime: 1, MAP: 7, PIP: 15    10-12 @ 07:01  -  10-13 @ 07:00  --------------------------------------------------------  IN: 915.6 mL / OUT: 2630 mL / NET: -1714.4 mL      CAPILLARY BLOOD GLUCOSE          PHYSICAL EXAM:  General:   HEENT:   Neck:   Respiratory:   Cardiovascular:   Abdomen:   Extremities:   Skin:   Neurological:  Psychiatry:    LINES:    HOSPITAL MEDICATIONS:  MEDICATIONS  (STANDING):  chlorhexidine 2% Cloths 1 Application(s) Topical <User Schedule>  dexMEDEtomidine Infusion 1.5 MICROgram(s)/kG/Hr (30.6 mL/Hr) IV Continuous <Continuous>  heparin   Injectable 5000 Unit(s) SubCutaneous every 8 hours  influenza   Vaccine 0.5 milliLiter(s) IntraMuscular once  lactated ringers. 1000 milliLiter(s) (100 mL/Hr) IV Continuous <Continuous>  pantoprazole  Injectable 40 milliGRAM(s) IV Push daily    MEDICATIONS  (PRN):  haloperidol    Injectable 5 milliGRAM(s) IntraMuscular every 6 hours PRN agitation  midazolam Injectable 2 milliGRAM(s) IV Push every 4 hours PRN agitation      LABS:                        12.8   9.04  )-----------( 171      ( 13 Oct 2023 02:35 )             42.3     Hgb Trend: 12.8<--, 11.7<--, 14.0<--  10-13    145  |  111<H>  |  10  ----------------------------<  99  4.2   |  26  |  1.04    Ca    8.1<L>      13 Oct 2023 02:35  Phos  3.8     10-13  Mg     2.1     10-13    TPro  6.8  /  Alb  3.1<L>  /  TBili  1.2  /  DBili  x   /  AST  43<H>  /  ALT  28  /  AlkPhos  62  10-13      Urinalysis Basic - ( 13 Oct 2023 02:35 )    Color: x / Appearance: x / SG: x / pH: x  Gluc: 99 mg/dL / Ketone: x  / Bili: x / Urobili: x   Blood: x / Protein: x / Nitrite: x   Leuk Esterase: x / RBC: x / WBC x   Sq Epi: x / Non Sq Epi: x / Bacteria: x      Arterial Blood Gas:  10-11 @ 17:27  7.41/39/110/25/99.0/0.1  ABG lactate: --        MICROBIOLOGY:     RADIOLOGY:  [ ] Reviewed and interpreted by me CHIEF COMPLAINT:    Interval Events:  received PRN dose of haldol + versed last evening at 9PM and this morning at 8AM  decreasing doses of pressor  slowly waking up but still lethargic    REVIEW OF SYSTEMS:  [ x] Unable to assess ROS because lethargic/under influence of sedatives    OBJECTIVE:  ICU Vital Signs Last 24 Hrs  T(C): 36.3 (13 Oct 2023 07:20), Max: 37.8 (13 Oct 2023 00:00)  T(F): 97.3 (13 Oct 2023 07:20), Max: 100 (13 Oct 2023 00:00)  HR: 76 (13 Oct 2023 07:20) (44 - 112)  BP: 139/93 (13 Oct 2023 07:00) (120/74 - 166/97)  BP(mean): 106 (13 Oct 2023 07:00) (88 - 126)  ABP: --  ABP(mean): --  RR: 7 (13 Oct 2023 07:20) (7 - 26)  SpO2: 100% (13 Oct 2023 07:20) (96% - 100%)    O2 Parameters below as of 12 Oct 2023 12:01  Patient On (Oxygen Delivery Method): nasal cannula  O2 Flow (L/min): 3        Mode: CPAP with PS, FiO2: 40, PEEP: 5, PS: 5, ITime: 1, MAP: 7, PIP: 15    10-12 @ 07:01  -  10-13 @ 07:00  --------------------------------------------------------  IN: 915.6 mL / OUT: 2630 mL / NET: -1714.4 mL      CAPILLARY BLOOD GLUCOSE          PHYSICAL EXAM:  General: NAD, non toxic appearing  HEENT: dry MM, EOMI  Neck: supple  Respiratory: poor inspiratory effort  Cardiovascular: s1s2 RRR  Abdomen: soft, non tender, non distended  Extremities: warm, no edema or clubbing  Skin: intact  Neurological: no focal deficits  Psychiatry: sleepy but answering some questions    LINES:  PIV    HOSPITAL MEDICATIONS:  MEDICATIONS  (STANDING):  chlorhexidine 2% Cloths 1 Application(s) Topical <User Schedule>  dexMEDEtomidine Infusion 1.5 MICROgram(s)/kG/Hr (30.6 mL/Hr) IV Continuous <Continuous>  heparin   Injectable 5000 Unit(s) SubCutaneous every 8 hours  influenza   Vaccine 0.5 milliLiter(s) IntraMuscular once  lactated ringers. 1000 milliLiter(s) (100 mL/Hr) IV Continuous <Continuous>  pantoprazole  Injectable 40 milliGRAM(s) IV Push daily    MEDICATIONS  (PRN):  haloperidol    Injectable 5 milliGRAM(s) IntraMuscular every 6 hours PRN agitation  midazolam Injectable 2 milliGRAM(s) IV Push every 4 hours PRN agitation      LABS:                        12.8   9.04  )-----------( 171      ( 13 Oct 2023 02:35 )             42.3     Hgb Trend: 12.8<--, 11.7<--, 14.0<--  10-13    145  |  111<H>  |  10  ----------------------------<  99  4.2   |  26  |  1.04    Ca    8.1<L>      13 Oct 2023 02:35  Phos  3.8     10-13  Mg     2.1     10-13    TPro  6.8  /  Alb  3.1<L>  /  TBili  1.2  /  DBili  x   /  AST  43<H>  /  ALT  28  /  AlkPhos  62  10-13      Urinalysis Basic - ( 13 Oct 2023 02:35 )    Color: x / Appearance: x / SG: x / pH: x  Gluc: 99 mg/dL / Ketone: x  / Bili: x / Urobili: x   Blood: x / Protein: x / Nitrite: x   Leuk Esterase: x / RBC: x / WBC x   Sq Epi: x / Non Sq Epi: x / Bacteria: x      Arterial Blood Gas:  10-11 @ 17:27  7.41/39/110/25/99.0/0.1  ABG lactate: --        MICROBIOLOGY:     RADIOLOGY:  [ ] Reviewed and interpreted by me

## 2023-10-13 NOTE — BH CONSULTATION LIAISON PROGRESS NOTE - NSBHATTESTBILLING_PSY_A_CORE
05709-Tqlzgsywoi OBS or IP - low complexity OR 25-34 mins 04290-Phazisayst OBS or IP - low complexity OR 25-34 mins 55562-Fzjlkwsaln OBS or IP - low complexity OR 25-34 mins

## 2023-10-13 NOTE — BH CONSULTATION LIAISON PROGRESS NOTE - NSICDXBHPRIMARYDX_PSY_ALL_CORE
AMS (altered mental status)   R41.83   AMS (altered mental status)   R41.84   AMS (altered mental status)   R41.38

## 2023-10-13 NOTE — PROGRESS NOTE ADULT - ASSESSMENT
28M w/ bipolar disorder, s/p prior suicide attempt, inpatient psych admission. Admitted w/ bizarre behavior after smoking marijuana with friends. Pt reportedly became increasingly agitated w/ bizarre behavior, thrashing around, hitting head requiring 4 point restraints and subsequent intubation. Pt was extubated the day after admission w/ resumption of aggressive bizarre behavior requiring placement in 4 point restraints, precedex, haldol and versed. Agitation has improved overall. Suspect posisble ingestion of laced substance while smoking marijuana vs acute psychotic/stress reaction or suicide attempt.     #Neuro - remains on decreasing doses of precedex, continue PRN versed + haldol; psych following to do full assessment prior to recommending any oral long term meds; neuro consult to r/o PNES or other causes, low suspicion for seizure disorder; can be switched to routine observation  #CV - HD stable  #Pulm - satting well on RA  #ID- no infectious issues  #Renal/metabolic - KIM now resolved; monitor I/Os, electrolytes  #GI- can start PO diet and give if pt awake enough  #Heme - no active issues  #Endo - monitor BG  #PPx - switch to lovenox qd  #Dispo- remains in ICU on precedex for severe agitation; full code    Plan of care discussed w/ pt's mother at bedside

## 2023-10-13 NOTE — DIETITIAN INITIAL EVALUATION ADULT - OTHER INFO
Per chart review, pt c severe agitation requiring intubation for patient and staff safety, psychosis, s/p Behavioral Health consult 10/13.

## 2023-10-13 NOTE — BH CONSULTATION LIAISON PROGRESS NOTE - NSBHFUPINTERVALHXFT_PSY_A_CORE
Much appreciate Neurology consultation - EEG recommended once Patient is able to tolerate it. Patient with episodes of agitation, restlessness overnight. Presentation not consistent with any type of withdrawal or intoxication.  Much appreciate Neurology consultation - EEG recommended once Patient is able to tolerate it. Patient with episodes of agitation, restlessness overnight. Presentation not consistent with any type of withdrawal or intoxication. Patient is much better today. Seen at bedside. Awake, alert, stable affect, + does not remember anything from the day of admission, only that 'I woke up here." Patient does not even remember working on music or getting a ride to [place] in his friend's car. Different experience that 2 years prior when he was psychiatrically admitted. Patient also hazy on recall in general , + has latency. Acknowledges that he has had some life stressors but takes a while to answer the question and cannot recall/describe exactly what these stressors are. He denies that he has had any suicidal ideation, intent or plan and denies presentation was an overdose. Admits to "smoking a lot" (of marijuana). COLLATERAL FROM PATIENT'S MOTHER ELI AND PASTOR RODRIGUEZ NEXT DOOR NEIGHBOR AND FAMILY FRIEND (at bedside, interviewed privately): Ms Benitez confirms that this "recording studio: was actually in her basement in her house, saw 7 people come in and hang out with Patient and saw them use drugs/smoke. Ms Benitez says that people brought their own drugs which was combined and then rolled into a joint. (she has videos in the house). Ms Eli and Pastor Rodriguez are both convinced that Patient had some laced substance / had a bad reaction to something his friends happen not to have bad reaction to. They are not concerned that this was a suicide attempt, Patient has not been making suicidal statements or gestures and they have no acute safety concerns.

## 2023-10-13 NOTE — CONSULT NOTE ADULT - ASSESSMENT
28 year old male with a PMH of bipolar disorder, s/p prior suicide attempt, h/o cutting and inpatient stay ~1 year ago brought in by his friend for "bizarre behavior." Patient had shaking episode.   PE very limited due to sedation  CTH no acute findings  CT C-spine degenrative changes    Impression: episode of shaking    PE limited by sedation limiting evaluation  Reviewed Dr Gomez's detailed note. Event described is suggestive of PNES, but should be evaluated further when stable  EEG once patient is stable  Will reevaluate once off sedation

## 2023-10-13 NOTE — PROGRESS NOTE ADULT - SUBJECTIVE AND OBJECTIVE BOX
Pt seen at bedside. No acute complaints. Denies pain, numbness, tingling, fevers, chills, CP, SOB, N/V/D.    Vital Signs (24 Hrs):  T(C): 37 (10-13-23 @ 15:06), Max: 37.8 (10-13-23 @ 00:00)  HR: 59 (10-13-23 @ 14:00) (44 - 80)  BP: 123/62 (10-13-23 @ 14:00) (123/62 - 156/104)  RR: 17 (10-13-23 @ 14:00) (7 - 26)  SpO2: 100% (10-13-23 @ 12:00) (96% - 100%)  Wt(kg): --    LABS:                          12.8   9.04  )-----------( 171      ( 13 Oct 2023 02:35 )             42.3     10-13    145  |  111<H>  |  10  ----------------------------<  99  4.2   |  26  |  1.04    Ca    8.1<L>      13 Oct 2023 02:35  Phos  3.8     10-13  Mg     2.1     10-13    TPro  6.8  /  Alb  3.1<L>  /  TBili  1.2  /  DBili  x   /  AST  43<H>  /  ALT  28  /  AlkPhos  62  10-13    LIVER FUNCTIONS - ( 13 Oct 2023 02:35 )  Alb: 3.1 g/dL / Pro: 6.8 gm/dL / ALK PHOS: 62 U/L / ALT: 28 U/L / AST: 43 U/L / GGT: x           Physical Exam:   General: NAD, patient laying in bed comfortably  Calves nontender  Compartments compressible, soft    Spine:  NTTP of C-,T-,L-Spine, no step offs    Motor:       C5   C6   C7   C8   T1  L   5/5  5/5  5/5  5/5  5/5  R   5/5  5/5  5/5  5/5  5/5         L2   L3    L4   L5   S1  L   5/5  5/5  5/5  5/5  5/5  R   5/5  5/5  5/5  5/5  5/5    Sensory:       C5   C6   C7   C8   T1  L    2     2     2     2     2   R    2     2     2     2     2          L2   L3    L4   L5   S1  L    2     2     2     2     2   R    2     2     2     2     2     No pain with SLR, no clonus, no babinksi, no coleman    Imaging:  CT CSp: Disc degeneration and spondylosis at C2-3 through C5-6 with mild loss of disc height. Small LEFT paramedian disc herniation noted at C2-3 which indents the ventral cord. Narrowing of the BILATERAL C3-4, C4-5 neural foramina due to uncovertebral spurring and facet osteophytic hypertrophy.    A/P:    28M who presents with small L paramedian disc herniation found on CT imaging     WBAT  PT/OT  Analgesics  DVT PPx per primary team  No acute orthopaedic interventions at this time  Stable for discharge from orthopaedic standpoint   Can follow up with Dr. Schrader in office for further management  Ortho to sign off, can reconsult with any changes in clinical course  Discussed plan with Dr. Schrader who agrees with above

## 2023-10-13 NOTE — BH CONSULTATION LIAISON PROGRESS NOTE - NSBHCONSULTRECOMMENDOTHER_PSY_A_CORE FT
10/12/23: Neurology consultation indicated in this case. Meanwhile would get MRI and EEG  - consider possible ingestion of a laced substance  - degenerative spine disease not correlating to Pt's young age (incidental finding)   - for severe agitation, would give combination of haldol 5mg IVP with Versed 2mg IVP q6hrs PRN   10/13/23: much appreciate Neurology consultation  - can use clonidine 0.1mg PO up to TID if agitation is suspected to be secondary to Precedex rebound/withdrawal; if not PO, can use transdermally   - agree with Neurology regarding need for EEG in this case. 24 hour video monitoring recommended   - if PNES, anticonvulsants are not indicated and could make symptoms worst actually  - at this time, too soon to give a definitive diagnosis and further work up is needed  - Patient CANNOT leave AMA  - Writer away next week on vacation so case will be followed by  Telepsychiatry  10/12/23: Neurology consultation indicated in this case. 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 24 hour video monitoring recommended   - if PNES, anticonvulsants are not indicated and could make symptoms worst actually  - at this time, too soon to give a definitive diagnosis and further work up is needed  - Patient CANNOT leave AMA at this time...will regain capacity once his mental status further improves and returns to baseline. He is aware the EEG stidy will be done   - Writer away next week on vacation so case will be followed by  Telepsychiatry

## 2023-10-13 NOTE — DIETITIAN INITIAL EVALUATION ADULT - PERTINENT MEDS FT
MEDICATIONS  (STANDING):  chlorhexidine 2% Cloths 1 Application(s) Topical <User Schedule>  enoxaparin Injectable 40 milliGRAM(s) SubCutaneous every 24 hours  influenza   Vaccine 0.5 milliLiter(s) IntraMuscular once  lactated ringers. 1000 milliLiter(s) (100 mL/Hr) IV Continuous <Continuous>  pantoprazole  Injectable 40 milliGRAM(s) IV Push daily    MEDICATIONS  (PRN):  haloperidol    Injectable 5 milliGRAM(s) IntraMuscular every 6 hours PRN agitation  midazolam Injectable 2 milliGRAM(s) IV Push every 4 hours PRN agitation

## 2023-10-13 NOTE — CONSULT NOTE ADULT - REASON FOR ADMISSION
Severe agitation requiring intubation for patient and staff safety, Psychosis
Severe agitation requiring intubation for patient and staff safety, Psychosis

## 2023-10-14 LAB
ALBUMIN SERPL ELPH-MCNC: 2.5 G/DL — LOW (ref 3.3–5)
ALP SERPL-CCNC: 51 U/L — SIGNIFICANT CHANGE UP (ref 40–120)
ALT FLD-CCNC: 24 U/L — SIGNIFICANT CHANGE UP (ref 12–78)
ANION GAP SERPL CALC-SCNC: 11 MMOL/L — SIGNIFICANT CHANGE UP (ref 5–17)
AST SERPL-CCNC: 42 U/L — HIGH (ref 15–37)
BASOPHILS # BLD AUTO: 0.06 K/UL — SIGNIFICANT CHANGE UP (ref 0–0.2)
BASOPHILS NFR BLD AUTO: 0.9 % — SIGNIFICANT CHANGE UP (ref 0–2)
BILIRUB SERPL-MCNC: 1.1 MG/DL — SIGNIFICANT CHANGE UP (ref 0.2–1.2)
BUN SERPL-MCNC: 7 MG/DL — SIGNIFICANT CHANGE UP (ref 7–23)
CALCIUM SERPL-MCNC: 7 MG/DL — LOW (ref 8.5–10.1)
CHLORIDE SERPL-SCNC: 108 MMOL/L — SIGNIFICANT CHANGE UP (ref 96–108)
CK SERPL-CCNC: 1051 U/L — HIGH (ref 26–308)
CO2 SERPL-SCNC: 19 MMOL/L — LOW (ref 22–31)
CREAT SERPL-MCNC: 0.74 MG/DL — SIGNIFICANT CHANGE UP (ref 0.5–1.3)
EGFR: 125 ML/MIN/1.73M2 — SIGNIFICANT CHANGE UP
EOSINOPHIL # BLD AUTO: 0.08 K/UL — SIGNIFICANT CHANGE UP (ref 0–0.5)
EOSINOPHIL NFR BLD AUTO: 1.2 % — SIGNIFICANT CHANGE UP (ref 0–6)
GLUCOSE SERPL-MCNC: 81 MG/DL — SIGNIFICANT CHANGE UP (ref 70–99)
HCT VFR BLD CALC: 35.9 % — LOW (ref 39–50)
HGB BLD-MCNC: 11.2 G/DL — LOW (ref 13–17)
IMM GRANULOCYTES NFR BLD AUTO: 0.3 % — SIGNIFICANT CHANGE UP (ref 0–0.9)
LYMPHOCYTES # BLD AUTO: 0.8 K/UL — LOW (ref 1–3.3)
LYMPHOCYTES # BLD AUTO: 12.3 % — LOW (ref 13–44)
MAGNESIUM SERPL-MCNC: 1.5 MG/DL — LOW (ref 1.6–2.6)
MANUAL SMEAR VERIFICATION: SIGNIFICANT CHANGE UP
MCHC RBC-ENTMCNC: 22.8 PG — LOW (ref 27–34)
MCHC RBC-ENTMCNC: 31.2 G/DL — LOW (ref 32–36)
MCV RBC AUTO: 73.1 FL — LOW (ref 80–100)
MONOCYTES # BLD AUTO: 0.65 K/UL — SIGNIFICANT CHANGE UP (ref 0–0.9)
MONOCYTES NFR BLD AUTO: 10 % — SIGNIFICANT CHANGE UP (ref 2–14)
NEUTROPHILS # BLD AUTO: 4.92 K/UL — SIGNIFICANT CHANGE UP (ref 1.8–7.4)
NEUTROPHILS NFR BLD AUTO: 75.3 % — SIGNIFICANT CHANGE UP (ref 43–77)
NRBC # BLD: 0 /100 WBCS — SIGNIFICANT CHANGE UP (ref 0–0)
PHOSPHATE SERPL-MCNC: 2.8 MG/DL — SIGNIFICANT CHANGE UP (ref 2.5–4.5)
PLAT MORPH BLD: NORMAL — SIGNIFICANT CHANGE UP
PLATELET # BLD AUTO: 111 K/UL — LOW (ref 150–400)
POTASSIUM SERPL-MCNC: 3.1 MMOL/L — LOW (ref 3.5–5.3)
POTASSIUM SERPL-SCNC: 3.1 MMOL/L — LOW (ref 3.5–5.3)
PROT SERPL-MCNC: 5.8 GM/DL — LOW (ref 6–8.3)
RBC # BLD: 4.91 M/UL — SIGNIFICANT CHANGE UP (ref 4.2–5.8)
RBC # FLD: 16.7 % — HIGH (ref 10.3–14.5)
RBC BLD AUTO: ABNORMAL
SODIUM SERPL-SCNC: 138 MMOL/L — SIGNIFICANT CHANGE UP (ref 135–145)
WBC # BLD: 6.53 K/UL — SIGNIFICANT CHANGE UP (ref 3.8–10.5)
WBC # FLD AUTO: 6.53 K/UL — SIGNIFICANT CHANGE UP (ref 3.8–10.5)

## 2023-10-14 PROCEDURE — 99291 CRITICAL CARE FIRST HOUR: CPT

## 2023-10-14 RX ORDER — MAGNESIUM SULFATE 500 MG/ML
2 VIAL (ML) INJECTION ONCE
Refills: 0 | Status: COMPLETED | OUTPATIENT
Start: 2023-10-14 | End: 2023-10-14

## 2023-10-14 RX ORDER — DEXMEDETOMIDINE HYDROCHLORIDE IN 0.9% SODIUM CHLORIDE 4 UG/ML
0.5 INJECTION INTRAVENOUS
Qty: 200 | Refills: 0 | Status: DISCONTINUED | OUTPATIENT
Start: 2023-10-14 | End: 2023-10-17

## 2023-10-14 RX ORDER — MIDAZOLAM HYDROCHLORIDE 1 MG/ML
2 INJECTION, SOLUTION INTRAMUSCULAR; INTRAVENOUS ONCE
Refills: 0 | Status: DISCONTINUED | OUTPATIENT
Start: 2023-10-14 | End: 2023-10-14

## 2023-10-14 RX ORDER — DIAZEPAM 5 MG
5 TABLET ORAL ONCE
Refills: 0 | Status: DISCONTINUED | OUTPATIENT
Start: 2023-10-14 | End: 2023-10-14

## 2023-10-14 RX ORDER — DIPHENHYDRAMINE HCL 50 MG
50 CAPSULE ORAL ONCE
Refills: 0 | Status: COMPLETED | OUTPATIENT
Start: 2023-10-14 | End: 2023-10-14

## 2023-10-14 RX ORDER — POTASSIUM CHLORIDE 20 MEQ
10 PACKET (EA) ORAL
Refills: 0 | Status: COMPLETED | OUTPATIENT
Start: 2023-10-14 | End: 2023-10-14

## 2023-10-14 RX ADMIN — MIDAZOLAM HYDROCHLORIDE 2 MILLIGRAM(S): 1 INJECTION, SOLUTION INTRAMUSCULAR; INTRAVENOUS at 04:48

## 2023-10-14 RX ADMIN — Medication 5 MILLIGRAM(S): at 10:37

## 2023-10-14 RX ADMIN — ENOXAPARIN SODIUM 40 MILLIGRAM(S): 100 INJECTION SUBCUTANEOUS at 10:37

## 2023-10-14 RX ADMIN — HALOPERIDOL DECANOATE 5 MILLIGRAM(S): 100 INJECTION INTRAMUSCULAR at 09:59

## 2023-10-14 RX ADMIN — DEXMEDETOMIDINE HYDROCHLORIDE IN 0.9% SODIUM CHLORIDE 10.2 MICROGRAM(S)/KG/HR: 4 INJECTION INTRAVENOUS at 21:35

## 2023-10-14 RX ADMIN — MIDAZOLAM HYDROCHLORIDE 2 MILLIGRAM(S): 1 INJECTION, SOLUTION INTRAMUSCULAR; INTRAVENOUS at 07:54

## 2023-10-14 RX ADMIN — CHLORHEXIDINE GLUCONATE 1 APPLICATION(S): 213 SOLUTION TOPICAL at 05:40

## 2023-10-14 RX ADMIN — PANTOPRAZOLE SODIUM 40 MILLIGRAM(S): 20 TABLET, DELAYED RELEASE ORAL at 11:11

## 2023-10-14 RX ADMIN — Medication 100 MILLIEQUIVALENT(S): at 07:09

## 2023-10-14 RX ADMIN — DEXMEDETOMIDINE HYDROCHLORIDE IN 0.9% SODIUM CHLORIDE 10.2 MICROGRAM(S)/KG/HR: 4 INJECTION INTRAVENOUS at 11:11

## 2023-10-14 RX ADMIN — Medication 50 MILLIGRAM(S): at 11:11

## 2023-10-14 RX ADMIN — Medication 100 MILLIEQUIVALENT(S): at 08:17

## 2023-10-14 RX ADMIN — Medication 25 GRAM(S): at 06:10

## 2023-10-14 RX ADMIN — HALOPERIDOL DECANOATE 5 MILLIGRAM(S): 100 INJECTION INTRAMUSCULAR at 17:01

## 2023-10-14 RX ADMIN — MIDAZOLAM HYDROCHLORIDE 2 MILLIGRAM(S): 1 INJECTION, SOLUTION INTRAMUSCULAR; INTRAVENOUS at 01:39

## 2023-10-14 RX ADMIN — MIDAZOLAM HYDROCHLORIDE 2 MILLIGRAM(S): 1 INJECTION, SOLUTION INTRAMUSCULAR; INTRAVENOUS at 20:25

## 2023-10-14 RX ADMIN — Medication 100 MILLIEQUIVALENT(S): at 06:10

## 2023-10-14 RX ADMIN — SODIUM CHLORIDE 50 MILLILITER(S): 9 INJECTION, SOLUTION INTRAVENOUS at 06:09

## 2023-10-14 RX ADMIN — MIDAZOLAM HYDROCHLORIDE 2 MILLIGRAM(S): 1 INJECTION, SOLUTION INTRAMUSCULAR; INTRAVENOUS at 14:11

## 2023-10-14 NOTE — PROGRESS NOTE ADULT - SUBJECTIVE AND OBJECTIVE BOX
Neurology Progress Note    S: Patient seen and examined in ICU. on 1:1 in restraints     Medication:  chlorhexidine 2% Cloths 1 Application(s) Topical <User Schedule>  dexMEDEtomidine Infusion 0.5 MICROgram(s)/kG/Hr IV Continuous <Continuous>  enoxaparin Injectable 40 milliGRAM(s) SubCutaneous every 24 hours  haloperidol    Injectable 5 milliGRAM(s) IntraMuscular every 6 hours PRN  influenza   Vaccine 0.5 milliLiter(s) IntraMuscular once  midazolam Injectable 2 milliGRAM(s) IV Push every 4 hours PRN  pantoprazole  Injectable 40 milliGRAM(s) IV Push daily      Vitals:  Vital Signs Last 24 Hrs  T(C): 36.8 (14 Oct 2023 16:17), Max: 37.3 (13 Oct 2023 23:00)  T(F): 98.2 (14 Oct 2023 16:17), Max: 99.2 (13 Oct 2023 23:00)  HR: 58 (14 Oct 2023 19:00) (52 - 154)  BP: 133/93 (14 Oct 2023 19:00) (126/77 - 162/102)  BP(mean): 91 (14 Oct 2023 18:00) (82 - 119)  RR: 18 (14 Oct 2023 19:00) (14 - 69)  SpO2: 100% (14 Oct 2023 19:00) (95% - 100%)    Parameters below as of 14 Oct 2023 16:17  Patient On (Oxygen Delivery Method): room air        General Exam:   General Appearance: Appropriately dressed and in no acute distress       Head: Normocephalic, atraumatic and no dysmorphic features  Ear, Nose, and Throat: Moist mucous membranes  CVS: S1S2+  Resp: No SOB, no wheeze or rhonchi  Abd: soft NTND  Extremities: No edema, no cyanosis  Skin: No bruises, no rashes     Neurological Exam:  Mental Status: Awake, alert and oriented x 2-3.  Able to follow simple and complex verbal commands. Able to name and repeat. fluent speech. No obvious aphasia or dysarthria noted.   Cranial Nerves: PERRL, EOMI, VFFC, sensation V1-V3 intact,  no obvious facial asymmetry , equal elevation of palate, scm/trap 5/5, tongue is midline on protrusion. no obvious papilledema on fundoscopic exam. Hearing is grossly intact.   Motor: Normal bulk, tone and strength throughout. Fine finger movements were intact and symmetric. no tremors or drift noted.    Sensation: Intact to light touch and pinprick throughout. no right/left confusion. no extinction to tactile on DSS.   Reflexes: 1+ throughout at biceps, brachioradialis, triceps, patellars and ankles bilaterally and equal. No clonus. R toe and L toe were both downgoing.  Coordination: No dysmetria on FNF   Gait: deferred     I personally reviewed the below data/images/labs:      CBC Full  -  ( 14 Oct 2023 02:50 )  WBC Count : 6.53 K/uL  RBC Count : 4.91 M/uL  Hemoglobin : 11.2 g/dL  Hematocrit : 35.9 %  Platelet Count - Automated : 111 K/uL  Mean Cell Volume : 73.1 fl  Mean Cell Hemoglobin : 22.8 pg  Mean Cell Hemoglobin Concentration : 31.2 g/dL  Auto Neutrophil # : 4.92 K/uL  Auto Lymphocyte # : 0.80 K/uL  Auto Monocyte # : 0.65 K/uL  Auto Eosinophil # : 0.08 K/uL  Auto Basophil # : 0.06 K/uL  Auto Neutrophil % : 75.3 %  Auto Lymphocyte % : 12.3 %  Auto Monocyte % : 10.0 %  Auto Eosinophil % : 1.2 %  Auto Basophil % : 0.9 %    10-14    138  |  108  |  7   ----------------------------<  81  3.1<L>   |  19<L>  |  0.74    Ca    7.0<L>      14 Oct 2023 02:50  Phos  2.8     10-14  Mg     1.5     10-14    TPro  5.8<L>  /  Alb  2.5<L>  /  TBili  1.1  /  DBili  x   /  AST  42<H>  /  ALT  24  /  AlkPhos  51  10-14    LIVER FUNCTIONS - ( 14 Oct 2023 02:50 )  Alb: 2.5 g/dL / Pro: 5.8 gm/dL / ALK PHOS: 51 U/L / ALT: 24 U/L / AST: 42 U/L / GGT: x             Urinalysis Basic - ( 14 Oct 2023 02:50 )    Color: x / Appearance: x / SG: x / pH: x  Gluc: 81 mg/dL / Ketone: x  / Bili: x / Urobili: x   Blood: x / Protein: x / Nitrite: x   Leuk Esterase: x / RBC: x / WBC x   Sq Epi: x / Non Sq Epi: x / Bacteria: x          CT HEAD: No acute abnormality.    Abundant secretions in the nasal cavity   andnasopharynx.    CT cervical spine:   No vertebral fracture is recognized.  Disc   degeneration and spondylosis at C2-3 through C5-6 with mild loss of disc   height. Small LEFT paramedian disc herniation noted at C2-3 which indents   the ventral cord.Narrowing of the BILATERAL C3-4, C4-5 neural foramina   due to uncovertebral spurring and facet osteophytic hypertrophy.    --- End of Report ---

## 2023-10-14 NOTE — PROGRESS NOTE ADULT - ASSESSMENT
28M w/ bipolar disorder, s/p prior suicide attempt, inpatient psych admission. Admitted w/ bizarre behavior after smoking marijuana with friends. Pt reportedly became increasingly agitated w/ bizarre behavior, thrashing around, hitting head requiring 4 point restraints and subsequent intubation. Pt was extubated the day after admission w/ resumption of aggressive bizarre behavior requiring placement in 4 point restraints, precedex, haldol and versed. Agitation has improved overall. Suspect posisble ingestion of laced substance while smoking marijuana vs acute psychotic/stress reaction or suicide attempt.     #Neuro - off precedex but remains very anxious, continue versed + haldol PRN for anxiety; psych following, recommending short acting meds for now; neuro consult to r/o PNES or other causes, EEG and MRI when able; low suspicion for seizure disorder  #CV - HD stable  #Pulm - satting well on RA  #ID- no infectious issues  #Renal/metabolic - KIM now resolved; monitor I/Os, electrolytes; d/c IVF since eating  #GI- PO diet  #Heme - no active issues  #Endo - monitor BG  #PPx - lovenox qd  #Dispo- remains in ICU with episodes of anxiety, high HR, receiving multiple pushes of BDZ and haldol; full code    Plan of care discussed w/ pt at bedside     28M w/ bipolar disorder, s/p prior suicide attempt, inpatient psych admission. Admitted w/ bizarre behavior after smoking marijuana with friends. Pt reportedly became increasingly agitated w/ bizarre behavior, thrashing around, hitting head requiring 4 point restraints and subsequent intubation. Pt was extubated the day after admission w/ resumption of aggressive bizarre behavior requiring placement in 4 point restraints, precedex, haldol and versed. Agitation has improved overall. Suspect posisble ingestion of laced substance while smoking marijuana vs acute psychotic/stress reaction or suicide attempt.     #Neuro - off precedex but remains very anxious, continue versed + haldol PRN for anxiety; psych following, recommending short acting meds for now; neuro consult to r/o PNES or other causes, EEG and MRI when able; low suspicion for seizure disorder  #CV - HD stable  #Pulm - satting well on RA  #ID- no infectious issues  #Renal/metabolic - KIM now resolved; hypoK repleted; monitor I/Os, electrolytes; d/c IVF since eating  #GI- PO diet  #Heme - no active issues  #Endo - monitor BG  #PPx - lovenox qd  #Dispo- remains in ICU with episodes of anxiety, high HR, receiving multiple pushes of BDZ and haldol; full code    Plan of care discussed w/ pt at bedside

## 2023-10-14 NOTE — PROGRESS NOTE ADULT - ASSESSMENT
28 year old male with a PMH of bipolar disorder, s/p prior suicide attempt, h/o cutting and inpatient stay ~1 year ago brought in by his friend for "bizarre behavior." Patient had shaking episode.   PE very limited due to sedation  CTH no acute findings  CT C-spine degenrative changes  no obvious tongue bite  no incontienence     Impression: episode of shaking, possible PNES  Reviewed Dr Gomez's detailed note. Event described is suggestive of PNES, but should be evaluated further when stable    - EEG and MRI brain   once patient is stable   - psych recs appreciated  - on 1:1 and restraints   - haldol PRN monitor QTC make sure < 500   - wean precedex as tolerated     Joseph Clark MD  Vascular Neurology  Office: [phone]    28 year old male with a PMH of bipolar disorder, s/p prior suicide attempt, h/o cutting and inpatient stay ~1 year ago brought in by his friend for "bizarre behavior." Patient had shaking episode.   PE very limited due to sedation  CTH no acute findings  CT C-spine degenrative changes  no obvious tongue bite  no incontienence     Impression: episode of shaking, possible PNES  Reviewed Dr Gomez's detailed note. Event described is suggestive of PNES, but should be evaluated further when stable    - EEG and MRI brain   once patient is stable   - psych recs appreciated  - on 1:1 and restraints   - haldol PRN monitor QTC make sure < 500   - wean precedex as tolerated     Joseph Clark MD  Vascular Neurology  Office: [phone]    28 year old male with a PMH of bipolar disorder, s/p prior suicide attempt, h/o cutting and inpatient stay ~1 year ago brought in by his friend for "bizarre behavior." Patient had shaking episode.   PE very limited due to sedation  CTH no acute findings  CT C-spine degenrative changes  no obvious tongue bite  no incontienence     Impression: episode of shaking, possible PNES  Reviewed Dr Gomez's detailed note. Event described is suggestive of PNES, but should be evaluated further when stable    - EEG and MRI brain   once patient is stable   - psych recs appreciated  - on 1:1 and restraints   - haldol PRN monitor QTC make sure < 500   - wean precedex as tolerated     Joseph Clark MD  Vascular Neurology  Office: [phone]

## 2023-10-14 NOTE — PROGRESS NOTE ADULT - SUBJECTIVE AND OBJECTIVE BOX
CHIEF COMPLAINT:    Interval Events:    REVIEW OF SYSTEMS:  Constitutional: [ ] fevers [ ] chills [ ] weight loss [ ] weight gain  HEENT: [ ] dry eyes [ ] eye irritation [ ] postnasal drip [ ] nasal congestion  CV: [ ] chest pain [ ] orthopnea [ ] palpitations [ ] murmur  Resp: [ ] cough [ ] shortness of breath [ ] dyspnea [ ] wheezing [ ] sputum [ ] hemoptysis  GI: [ ] nausea [ ] vomiting [ ] diarrhea [ ] constipation [ ] abd pain [ ] dysphagia   : [ ] dysuria [ ] nocturia [ ] hematuria [ ] increased urinary frequency  Musculoskeletal: [ ] back pain [ ] myalgias [ ] arthralgias [ ] fracture  Skin: [ ] rash [ ] itch  Neurological: [ ] headache [ ] dizziness [ ] syncope [ ] weakness [ ] numbness  Hematologic/Lymphatic: [ ] anemia [ ] bleeding problem  Allergic/Immunologic: [ ] itchy eyes [ ] nasal discharge [ ] hives [ ] angioedema  [ ] All other systems negative  [ ] Unable to assess ROS because ________    OBJECTIVE:  ICU Vital Signs Last 24 Hrs  T(C): 37.1 (14 Oct 2023 07:39), Max: 37.4 (13 Oct 2023 19:01)  T(F): 98.8 (14 Oct 2023 07:39), Max: 99.4 (13 Oct 2023 19:01)  HR: 98 (14 Oct 2023 07:39) (52 - 108)  BP: 152/66 (14 Oct 2023 07:00) (123/62 - 162/102)  BP(mean): 89 (14 Oct 2023 07:00) (79 - 117)  ABP: --  ABP(mean): --  RR: 17 (14 Oct 2023 07:39) (15 - 37)  SpO2: 100% (14 Oct 2023 07:39) (98% - 100%)    O2 Parameters below as of 14 Oct 2023 00:00  Patient On (Oxygen Delivery Method): room air              10-13 @ 07:01  -  10-14 @ 07:00  --------------------------------------------------------  IN: 2394.8 mL / OUT: 1550 mL / NET: 844.8 mL      CAPILLARY BLOOD GLUCOSE          PHYSICAL EXAM:  General:   HEENT:   Neck:   Respiratory:   Cardiovascular:   Abdomen:   Extremities:   Skin:   Neurological:  Psychiatry:    LINES:    HOSPITAL MEDICATIONS:  MEDICATIONS  (STANDING):  chlorhexidine 2% Cloths 1 Application(s) Topical <User Schedule>  enoxaparin Injectable 40 milliGRAM(s) SubCutaneous every 24 hours  influenza   Vaccine 0.5 milliLiter(s) IntraMuscular once  lactated ringers. 1000 milliLiter(s) (50 mL/Hr) IV Continuous <Continuous>  pantoprazole  Injectable 40 milliGRAM(s) IV Push daily  potassium chloride  10 mEq/100 mL IVPB 10 milliEquivalent(s) IV Intermittent every 1 hour    MEDICATIONS  (PRN):  haloperidol    Injectable 5 milliGRAM(s) IntraMuscular every 6 hours PRN agitation  midazolam Injectable 2 milliGRAM(s) IV Push every 4 hours PRN agitation      LABS:                        11.2   6.53  )-----------( 111      ( 14 Oct 2023 02:50 )             35.9     Hgb Trend: 11.2<--, 12.8<--, 11.7<--, 14.0<--  10-14    138  |  108  |  7   ----------------------------<  81  3.1<L>   |  19<L>  |  0.74    Ca    7.0<L>      14 Oct 2023 02:50  Phos  2.8     10-14  Mg     1.5     10-14    TPro  5.8<L>  /  Alb  2.5<L>  /  TBili  1.1  /  DBili  x   /  AST  42<H>  /  ALT  24  /  AlkPhos  51  10-14      Urinalysis Basic - ( 14 Oct 2023 02:50 )    Color: x / Appearance: x / SG: x / pH: x  Gluc: 81 mg/dL / Ketone: x  / Bili: x / Urobili: x   Blood: x / Protein: x / Nitrite: x   Leuk Esterase: x / RBC: x / WBC x   Sq Epi: x / Non Sq Epi: x / Bacteria: x            MICROBIOLOGY:     RADIOLOGY:  [ ] Reviewed and interpreted by me CHIEF COMPLAINT:    Interval Events:  having panic attacks w/ diaphoresis, tachycardia, received versed x2  off precedex  feeling very anxious this morning    REVIEW OF SYSTEMS:  Constitutional: [- ] fevers [ -] chills [ ] weight loss [ ] weight gain  HEENT: [ ] dry eyes [ ] eye irritation [ ] postnasal drip [ ] nasal congestion  CV: [- ] chest pain [ -] orthopnea [- ] palpitations [ ] murmur  Resp: [ -] cough [- ] shortness of breath [- ] dyspnea [ ] wheezing [ ] sputum [ ] hemoptysis  GI: [ -] nausea [ -] vomiting [- ] diarrhea [- ] constipation [- ] abd pain [ ] dysphagia   : [ ] dysuria [ ] nocturia [ ] hematuria [ ] increased urinary frequency  Musculoskeletal: [- ] back pain [ ] myalgias [ ] arthralgias [ ] fracture  Skin: [ ] rash [ ] itch  Neurological: [ -] headache [- ] dizziness [- ] syncope [- ] weakness [ -] numbness  Hematologic/Lymphatic: [ ] anemia [ ] bleeding problem  Allergic/Immunologic: [ ] itchy eyes [ ] nasal discharge [ ] hives [ ] angioedema  [ x] All other systems negative    OBJECTIVE:  ICU Vital Signs Last 24 Hrs  T(C): 37.1 (14 Oct 2023 07:39), Max: 37.4 (13 Oct 2023 19:01)  T(F): 98.8 (14 Oct 2023 07:39), Max: 99.4 (13 Oct 2023 19:01)  HR: 98 (14 Oct 2023 07:39) (52 - 108)  BP: 152/66 (14 Oct 2023 07:00) (123/62 - 162/102)  BP(mean): 89 (14 Oct 2023 07:00) (79 - 117)  ABP: --  ABP(mean): --  RR: 17 (14 Oct 2023 07:39) (15 - 37)  SpO2: 100% (14 Oct 2023 07:39) (98% - 100%)    O2 Parameters below as of 14 Oct 2023 00:00  Patient On (Oxygen Delivery Method): room air              10-13 @ 07:01  -  10-14 @ 07:00  --------------------------------------------------------  IN: 2394.8 mL / OUT: 1550 mL / NET: 844.8 mL      CAPILLARY BLOOD GLUCOSE          PHYSICAL EXAM:  General: NAD, non toxic appearing  HEENT: dry MM, EOMI  Neck: supple  Respiratory: poor inspiratory effort  Cardiovascular: s1s2 RRR  Abdomen: soft, non tender, non distended  Extremities: warm, no edema or clubbing  Skin: intact  Neurological: no focal deficits  Psychiatry: pleasant but very anxious, AAOx3    LINES:  [facility]    HOSPITAL MEDICATIONS:  MEDICATIONS  (STANDING):  chlorhexidine 2% Cloths 1 Application(s) Topical <User Schedule>  enoxaparin Injectable 40 milliGRAM(s) SubCutaneous every 24 hours  influenza   Vaccine 0.5 milliLiter(s) IntraMuscular once  lactated ringers. 1000 milliLiter(s) (50 mL/Hr) IV Continuous <Continuous>  pantoprazole  Injectable 40 milliGRAM(s) IV Push daily  potassium chloride  10 mEq/100 mL IVPB 10 milliEquivalent(s) IV Intermittent every 1 hour    MEDICATIONS  (PRN):  haloperidol    Injectable 5 milliGRAM(s) IntraMuscular every 6 hours PRN agitation  midazolam Injectable 2 milliGRAM(s) IV Push every 4 hours PRN agitation      LABS:                        11.2   6.53  )-----------( 111      ( 14 Oct 2023 02:50 )             35.9     Hgb Trend: 11.2<--, 12.8<--, 11.7<--, 14.0<--  10-14    138  |  108  |  7   ----------------------------<  81  3.1<L>   |  19<L>  |  0.74    Ca    7.0<L>      14 Oct 2023 02:50  Phos  2.8     10-14  Mg     1.5     10-14    TPro  5.8<L>  /  Alb  2.5<L>  /  TBili  1.1  /  DBili  x   /  AST  42<H>  /  ALT  24  /  AlkPhos  51  10-14      Urinalysis Basic - ( 14 Oct 2023 02:50 )    Color: x / Appearance: x / SG: x / pH: x  Gluc: 81 mg/dL / Ketone: x  / Bili: x / Urobili: x   Blood: x / Protein: x / Nitrite: x   Leuk Esterase: x / RBC: x / WBC x   Sq Epi: x / Non Sq Epi: x / Bacteria: x            MICROBIOLOGY:     RADIOLOGY:  [ ] Reviewed and interpreted by me CHIEF COMPLAINT:    Interval Events:  having panic attacks w/ diaphoresis, tachycardia, received versed x2  off precedex  feeling very anxious this morning    REVIEW OF SYSTEMS:  Constitutional: [- ] fevers [ -] chills [ ] weight loss [ ] weight gain  HEENT: [ ] dry eyes [ ] eye irritation [ ] postnasal drip [ ] nasal congestion  CV: [- ] chest pain [ -] orthopnea [- ] palpitations [ ] murmur  Resp: [ -] cough [- ] shortness of breath [- ] dyspnea [ ] wheezing [ ] sputum [ ] hemoptysis  GI: [ -] nausea [ -] vomiting [- ] diarrhea [- ] constipation [- ] abd pain [ ] dysphagia   : [ ] dysuria [ ] nocturia [ ] hematuria [ ] increased urinary frequency  Musculoskeletal: [- ] back pain [ ] myalgias [ ] arthralgias [ ] fracture  Skin: [ ] rash [ ] itch  Neurological: [ -] headache [- ] dizziness [- ] syncope [- ] weakness [ -] numbness  Hematologic/Lymphatic: [ ] anemia [ ] bleeding problem  Allergic/Immunologic: [ ] itchy eyes [ ] nasal discharge [ ] hives [ ] angioedema  [ x] All other systems negative    OBJECTIVE:  ICU Vital Signs Last 24 Hrs  T(C): 37.1 (14 Oct 2023 07:39), Max: 37.4 (13 Oct 2023 19:01)  T(F): 98.8 (14 Oct 2023 07:39), Max: 99.4 (13 Oct 2023 19:01)  HR: 98 (14 Oct 2023 07:39) (52 - 108)  BP: 152/66 (14 Oct 2023 07:00) (123/62 - 162/102)  BP(mean): 89 (14 Oct 2023 07:00) (79 - 117)  ABP: --  ABP(mean): --  RR: 17 (14 Oct 2023 07:39) (15 - 37)  SpO2: 100% (14 Oct 2023 07:39) (98% - 100%)    O2 Parameters below as of 14 Oct 2023 00:00  Patient On (Oxygen Delivery Method): room air              10-13 @ 07:01  -  10-14 @ 07:00  --------------------------------------------------------  IN: 2394.8 mL / OUT: 1550 mL / NET: 844.8 mL      CAPILLARY BLOOD GLUCOSE          PHYSICAL EXAM:  General: NAD, non toxic appearing  HEENT: dry MM, EOMI  Neck: supple  Respiratory: poor inspiratory effort  Cardiovascular: s1s2 RRR  Abdomen: soft, non tender, non distended  Extremities: warm, no edema or clubbing  Skin: intact  Neurological: no focal deficits  Psychiatry: pleasant but very anxious, AAOx3    LINES:  \[facility]\""    HOSPITAL MEDICATIONS:  MEDICATIONS  (STANDING):  chlorhexidine 2% Cloths 1 Application(s) Topical <User Schedule>  enoxaparin Injectable 40 milliGRAM(s) SubCutaneous every 24 hours  influenza   Vaccine 0.5 milliLiter(s) IntraMuscular once  lactated ringers. 1000 milliLiter(s) (50 mL/Hr) IV Continuous <Continuous>  pantoprazole  Injectable 40 milliGRAM(s) IV Push daily  potassium chloride  10 mEq/100 mL IVPB 10 milliEquivalent(s) IV Intermittent every 1 hour    MEDICATIONS  (PRN):  haloperidol    Injectable 5 milliGRAM(s) IntraMuscular every 6 hours PRN agitation  midazolam Injectable 2 milliGRAM(s) IV Push every 4 hours PRN agitation      LABS:                        11.2   6.53  )-----------( 111      ( 14 Oct 2023 02:50 )             35.9     Hgb Trend: 11.2<--, 12.8<--, 11.7<--, 14.0<--  10-14    138  |  108  |  7   ----------------------------<  81  3.1<L>   |  19<L>  |  0.74    Ca    7.0<L>      14 Oct 2023 02:50  Phos  2.8     10-14  Mg     1.5     10-14    TPro  5.8<L>  /  Alb  2.5<L>  /  TBili  1.1  /  DBili  x   /  AST  42<H>  /  ALT  24  /  AlkPhos  51  10-14      Urinalysis Basic - ( 14 Oct 2023 02:50 )    Color: x / Appearance: x / SG: x / pH: x  Gluc: 81 mg/dL / Ketone: x  / Bili: x / Urobili: x   Blood: x / Protein: x / Nitrite: x   Leuk Esterase: x / RBC: x / WBC x   Sq Epi: x / Non Sq Epi: x / Bacteria: x            MICROBIOLOGY:     RADIOLOGY:  [ ] Reviewed and interpreted by me CHIEF COMPLAINT:    Interval Events:  having panic attacks w/ diaphoresis, tachycardia, received versed x2  off precedex  feeling very anxious this morning    REVIEW OF SYSTEMS:  Constitutional: [- ] fevers [ -] chills [ ] weight loss [ ] weight gain  HEENT: [ ] dry eyes [ ] eye irritation [ ] postnasal drip [ ] nasal congestion  CV: [- ] chest pain [ -] orthopnea [- ] palpitations [ ] murmur  Resp: [ -] cough [- ] shortness of breath [- ] dyspnea [ ] wheezing [ ] sputum [ ] hemoptysis  GI: [ -] nausea [ -] vomiting [- ] diarrhea [- ] constipation [- ] abd pain [ ] dysphagia   : [ ] dysuria [ ] nocturia [ ] hematuria [ ] increased urinary frequency  Musculoskeletal: [- ] back pain [ ] myalgias [ ] arthralgias [ ] fracture  Skin: [ ] rash [ ] itch  Neurological: [ -] headache [- ] dizziness [- ] syncope [- ] weakness [ -] numbness  Hematologic/Lymphatic: [ ] anemia [ ] bleeding problem  Allergic/Immunologic: [ ] itchy eyes [ ] nasal discharge [ ] hives [ ] angioedema  [ x] All other systems negative    OBJECTIVE:  ICU Vital Signs Last 24 Hrs  T(C): 37.1 (14 Oct 2023 07:39), Max: 37.4 (13 Oct 2023 19:01)  T(F): 98.8 (14 Oct 2023 07:39), Max: 99.4 (13 Oct 2023 19:01)  HR: 98 (14 Oct 2023 07:39) (52 - 108)  BP: 152/66 (14 Oct 2023 07:00) (123/62 - 162/102)  BP(mean): 89 (14 Oct 2023 07:00) (79 - 117)  ABP: --  ABP(mean): --  RR: 17 (14 Oct 2023 07:39) (15 - 37)  SpO2: 100% (14 Oct 2023 07:39) (98% - 100%)    O2 Parameters below as of 14 Oct 2023 00:00  Patient On (Oxygen Delivery Method): room air              10-13 @ 07:01  -  10-14 @ 07:00  --------------------------------------------------------  IN: 2394.8 mL / OUT: 1550 mL / NET: 844.8 mL      CAPILLARY BLOOD GLUCOSE          PHYSICAL EXAM:  General: NAD, non toxic appearing  HEENT: dry MM, EOMI  Neck: supple  Respiratory: poor inspiratory effort  Cardiovascular: s1s2 RRR  Abdomen: soft, non tender, non distended  Extremities: warm, no edema or clubbing  Skin: intact  Neurological: no focal deficits  Psychiatry: pleasant but very anxious, AAOx3    LINES:  [facility] [place]    HOSPITAL MEDICATIONS:  MEDICATIONS  (STANDING):  chlorhexidine 2% Cloths 1 Application(s) Topical <User Schedule>  enoxaparin Injectable 40 milliGRAM(s) SubCutaneous every 24 hours  influenza   Vaccine 0.5 milliLiter(s) IntraMuscular once  lactated ringers. 1000 milliLiter(s) (50 mL/Hr) IV Continuous <Continuous>  pantoprazole  Injectable 40 milliGRAM(s) IV Push daily  potassium chloride  10 mEq/100 mL IVPB 10 milliEquivalent(s) IV Intermittent every 1 hour    MEDICATIONS  (PRN):  haloperidol    Injectable 5 milliGRAM(s) IntraMuscular every 6 hours PRN agitation  midazolam Injectable 2 milliGRAM(s) IV Push every 4 hours PRN agitation      LABS:                        11.2   6.53  )-----------( 111      ( 14 Oct 2023 02:50 )             35.9     Hgb Trend: 11.2<--, 12.8<--, 11.7<--, 14.0<--  10-14    138  |  108  |  7   ----------------------------<  81  3.1<L>   |  19<L>  |  0.74    Ca    7.0<L>      14 Oct 2023 02:50  Phos  2.8     10-14  Mg     1.5     10-14    TPro  5.8<L>  /  Alb  2.5<L>  /  TBili  1.1  /  DBili  x   /  AST  42<H>  /  ALT  24  /  AlkPhos  51  10-14      Urinalysis Basic - ( 14 Oct 2023 02:50 )    Color: x / Appearance: x / SG: x / pH: x  Gluc: 81 mg/dL / Ketone: x  / Bili: x / Urobili: x   Blood: x / Protein: x / Nitrite: x   Leuk Esterase: x / RBC: x / WBC x   Sq Epi: x / Non Sq Epi: x / Bacteria: x            MICROBIOLOGY:     RADIOLOGY:  [ ] Reviewed and interpreted by me

## 2023-10-15 LAB
ALBUMIN SERPL ELPH-MCNC: 3.2 G/DL — LOW (ref 3.3–5)
ALP SERPL-CCNC: 66 U/L — SIGNIFICANT CHANGE UP (ref 40–120)
ALT FLD-CCNC: 35 U/L — SIGNIFICANT CHANGE UP (ref 12–78)
AMPHET UR-MCNC: NEGATIVE NG/ML — SIGNIFICANT CHANGE UP
ANION GAP SERPL CALC-SCNC: 7 MMOL/L — SIGNIFICANT CHANGE UP (ref 5–17)
AST SERPL-CCNC: 47 U/L — HIGH (ref 15–37)
BARBITURATES UR QL SCN: NEGATIVE NG/ML — SIGNIFICANT CHANGE UP
BARBITURATES UR-MCNC: NEGATIVE NG/ML — SIGNIFICANT CHANGE UP
BASOPHILS # BLD AUTO: 0.05 K/UL — SIGNIFICANT CHANGE UP (ref 0–0.2)
BASOPHILS NFR BLD AUTO: 0.5 % — SIGNIFICANT CHANGE UP (ref 0–2)
BENZODIAZ UR-MCNC: NEGATIVE NG/ML — SIGNIFICANT CHANGE UP
BILIRUB SERPL-MCNC: 1.3 MG/DL — HIGH (ref 0.2–1.2)
BUN SERPL-MCNC: 8 MG/DL — SIGNIFICANT CHANGE UP (ref 7–23)
CALCIUM SERPL-MCNC: 8.8 MG/DL — SIGNIFICANT CHANGE UP (ref 8.5–10.1)
CANNABINOID RESULT, UR: POSITIVE
CANNABINOIDS UR QL SCN: POSITIVE
CHLORIDE SERPL-SCNC: 106 MMOL/L — SIGNIFICANT CHANGE UP (ref 96–108)
CO2 SERPL-SCNC: 25 MMOL/L — SIGNIFICANT CHANGE UP (ref 22–31)
COCAINE METAB.OTHER UR-MCNC: NEGATIVE NG/ML — SIGNIFICANT CHANGE UP
CREAT SERPL-MCNC: 1 MG/DL — SIGNIFICANT CHANGE UP (ref 0.5–1.3)
CREATININE, URINE THERAPEUTIC: 359.2 MG/DL — HIGH (ref 20–300)
EGFR: 104 ML/MIN/1.73M2 — SIGNIFICANT CHANGE UP
EOSINOPHIL # BLD AUTO: 0.24 K/UL — SIGNIFICANT CHANGE UP (ref 0–0.5)
EOSINOPHIL NFR BLD AUTO: 2.4 % — SIGNIFICANT CHANGE UP (ref 0–6)
FENTANYL RESULT, UR: NEGATIVE NG/ML — SIGNIFICANT CHANGE UP
FENTANYL UR QL SCN: NEGATIVE NG/ML — SIGNIFICANT CHANGE UP
GLUCOSE SERPL-MCNC: 121 MG/DL — HIGH (ref 70–99)
HCT VFR BLD CALC: 43.4 % — SIGNIFICANT CHANGE UP (ref 39–50)
HGB BLD-MCNC: 13.6 G/DL — SIGNIFICANT CHANGE UP (ref 13–17)
IMM GRANULOCYTES NFR BLD AUTO: 0.5 % — SIGNIFICANT CHANGE UP (ref 0–0.9)
LYMPHOCYTES # BLD AUTO: 2.26 K/UL — SIGNIFICANT CHANGE UP (ref 1–3.3)
LYMPHOCYTES # BLD AUTO: 22.4 % — SIGNIFICANT CHANGE UP (ref 13–44)
Lab: SIGNIFICANT CHANGE UP
MAGNESIUM SERPL-MCNC: 2 MG/DL — SIGNIFICANT CHANGE UP (ref 1.6–2.6)
MCHC RBC-ENTMCNC: 22.8 PG — LOW (ref 27–34)
MCHC RBC-ENTMCNC: 31.3 G/DL — LOW (ref 32–36)
MCV RBC AUTO: 72.7 FL — LOW (ref 80–100)
METHADONE UR-MCNC: NEGATIVE NG/ML — SIGNIFICANT CHANGE UP
MONOCYTES # BLD AUTO: 1.19 K/UL — HIGH (ref 0–0.9)
MONOCYTES NFR BLD AUTO: 11.8 % — SIGNIFICANT CHANGE UP (ref 2–14)
NEUTROPHILS # BLD AUTO: 6.3 K/UL — SIGNIFICANT CHANGE UP (ref 1.8–7.4)
NEUTROPHILS NFR BLD AUTO: 62.4 % — SIGNIFICANT CHANGE UP (ref 43–77)
NRBC # BLD: 0 /100 WBCS — SIGNIFICANT CHANGE UP (ref 0–0)
OPIATES UR-MCNC: NEGATIVE NG/ML — SIGNIFICANT CHANGE UP
OXYCODONE UR QL SCN: NEGATIVE NG/ML — SIGNIFICANT CHANGE UP
PCP UR-MCNC: NEGATIVE NG/ML — SIGNIFICANT CHANGE UP
PH, URINE RESULT: 5.4 — SIGNIFICANT CHANGE UP (ref 4.5–8.9)
PHOSPHATE SERPL-MCNC: 4.3 MG/DL — SIGNIFICANT CHANGE UP (ref 2.5–4.5)
PLATELET # BLD AUTO: 163 K/UL — SIGNIFICANT CHANGE UP (ref 150–400)
POTASSIUM SERPL-MCNC: 4 MMOL/L — SIGNIFICANT CHANGE UP (ref 3.5–5.3)
POTASSIUM SERPL-SCNC: 4 MMOL/L — SIGNIFICANT CHANGE UP (ref 3.5–5.3)
PROT SERPL-MCNC: 7.5 GM/DL — SIGNIFICANT CHANGE UP (ref 6–8.3)
RBC # BLD: 5.97 M/UL — HIGH (ref 4.2–5.8)
RBC # FLD: 17.5 % — HIGH (ref 10.3–14.5)
SODIUM SERPL-SCNC: 138 MMOL/L — SIGNIFICANT CHANGE UP (ref 135–145)
THC UR CFM-MCNC: >300 NG/ML — SIGNIFICANT CHANGE UP
THC UR QL: SIGNIFICANT CHANGE UP NG/ML
WBC # BLD: 10.09 K/UL — SIGNIFICANT CHANGE UP (ref 3.8–10.5)
WBC # FLD AUTO: 10.09 K/UL — SIGNIFICANT CHANGE UP (ref 3.8–10.5)

## 2023-10-15 PROCEDURE — 93010 ELECTROCARDIOGRAM REPORT: CPT

## 2023-10-15 PROCEDURE — 99291 CRITICAL CARE FIRST HOUR: CPT

## 2023-10-15 RX ORDER — MIDAZOLAM HYDROCHLORIDE 1 MG/ML
2 INJECTION, SOLUTION INTRAMUSCULAR; INTRAVENOUS ONCE
Refills: 0 | Status: DISCONTINUED | OUTPATIENT
Start: 2023-10-15 | End: 2023-10-15

## 2023-10-15 RX ORDER — DIPHENHYDRAMINE HCL 50 MG
25 CAPSULE ORAL ONCE
Refills: 0 | Status: COMPLETED | OUTPATIENT
Start: 2023-10-15 | End: 2023-10-15

## 2023-10-15 RX ADMIN — PANTOPRAZOLE SODIUM 40 MILLIGRAM(S): 20 TABLET, DELAYED RELEASE ORAL at 11:54

## 2023-10-15 RX ADMIN — DEXMEDETOMIDINE HYDROCHLORIDE IN 0.9% SODIUM CHLORIDE 10.2 MICROGRAM(S)/KG/HR: 4 INJECTION INTRAVENOUS at 17:11

## 2023-10-15 RX ADMIN — MIDAZOLAM HYDROCHLORIDE 2 MILLIGRAM(S): 1 INJECTION, SOLUTION INTRAMUSCULAR; INTRAVENOUS at 06:45

## 2023-10-15 RX ADMIN — ENOXAPARIN SODIUM 40 MILLIGRAM(S): 100 INJECTION SUBCUTANEOUS at 11:53

## 2023-10-15 RX ADMIN — MIDAZOLAM HYDROCHLORIDE 2 MILLIGRAM(S): 1 INJECTION, SOLUTION INTRAMUSCULAR; INTRAVENOUS at 21:36

## 2023-10-15 RX ADMIN — HALOPERIDOL DECANOATE 5 MILLIGRAM(S): 100 INJECTION INTRAMUSCULAR at 08:25

## 2023-10-15 RX ADMIN — MIDAZOLAM HYDROCHLORIDE 2 MILLIGRAM(S): 1 INJECTION, SOLUTION INTRAMUSCULAR; INTRAVENOUS at 10:11

## 2023-10-15 RX ADMIN — DEXMEDETOMIDINE HYDROCHLORIDE IN 0.9% SODIUM CHLORIDE 10.2 MICROGRAM(S)/KG/HR: 4 INJECTION INTRAVENOUS at 00:28

## 2023-10-15 RX ADMIN — DEXMEDETOMIDINE HYDROCHLORIDE IN 0.9% SODIUM CHLORIDE 10.2 MICROGRAM(S)/KG/HR: 4 INJECTION INTRAVENOUS at 21:07

## 2023-10-15 RX ADMIN — DEXMEDETOMIDINE HYDROCHLORIDE IN 0.9% SODIUM CHLORIDE 10.2 MICROGRAM(S)/KG/HR: 4 INJECTION INTRAVENOUS at 08:25

## 2023-10-15 RX ADMIN — DEXMEDETOMIDINE HYDROCHLORIDE IN 0.9% SODIUM CHLORIDE 10.2 MICROGRAM(S)/KG/HR: 4 INJECTION INTRAVENOUS at 19:24

## 2023-10-15 RX ADMIN — DEXMEDETOMIDINE HYDROCHLORIDE IN 0.9% SODIUM CHLORIDE 10.2 MICROGRAM(S)/KG/HR: 4 INJECTION INTRAVENOUS at 04:38

## 2023-10-15 RX ADMIN — MIDAZOLAM HYDROCHLORIDE 2 MILLIGRAM(S): 1 INJECTION, SOLUTION INTRAMUSCULAR; INTRAVENOUS at 17:11

## 2023-10-15 RX ADMIN — MIDAZOLAM HYDROCHLORIDE 2 MILLIGRAM(S): 1 INJECTION, SOLUTION INTRAMUSCULAR; INTRAVENOUS at 14:36

## 2023-10-15 RX ADMIN — CHLORHEXIDINE GLUCONATE 1 APPLICATION(S): 213 SOLUTION TOPICAL at 12:40

## 2023-10-15 RX ADMIN — DEXMEDETOMIDINE HYDROCHLORIDE IN 0.9% SODIUM CHLORIDE 10.2 MICROGRAM(S)/KG/HR: 4 INJECTION INTRAVENOUS at 12:39

## 2023-10-15 RX ADMIN — Medication 25 MILLIGRAM(S): at 10:11

## 2023-10-15 RX ADMIN — HALOPERIDOL DECANOATE 5 MILLIGRAM(S): 100 INJECTION INTRAMUSCULAR at 14:36

## 2023-10-15 NOTE — PROGRESS NOTE ADULT - ASSESSMENT
28M w/ bipolar disorder, s/p prior suicide attempt, inpatient psych admission. Admitted w/ bizarre behavior after smoking marijuana with friends. Pt reportedly became increasingly agitated w/ bizarre behavior, thrashing around, hitting head requiring 4 point restraints and subsequent intubation. Pt was extubated the day after admission w/ resumption of aggressive bizarre behavior requiring placement in 4 point restraints, precedex, haldol and versed. Agitation has improved overall. Suspect posisble ingestion of laced substance while smoking marijuana vs acute psychotic/stress reaction or suicide attempt.     #Neuro - restarted on precedex for agitation/anxiety; continue versed + haldol PRN for anxiety, w/ benadryl as well; psych following, recommending short acting meds for now; neuro consult to r/o PNES or other causes, EEG and MRI when able; low suspicion for seizure disorder  #CV - HD stable  #Pulm - satting well on RA  #ID- no infectious issues  #Renal/metabolic - KIM now resolved; monitor I/Os, electrolytes  #GI- PO diet  #Heme - no active issues  #Endo - monitor BG; check TSH  #PPx - lovenox qd  #Dispo- remains in ICU with episodes of anxiety, high HR, receiving multiple pushes of BDZ and haldol and restarted on precedex; full code

## 2023-10-15 NOTE — PROGRESS NOTE ADULT - SUBJECTIVE AND OBJECTIVE BOX
CHIEF COMPLAINT:    Interval Events:    REVIEW OF SYSTEMS:  Constitutional: [ ] fevers [ ] chills [ ] weight loss [ ] weight gain  HEENT: [ ] dry eyes [ ] eye irritation [ ] postnasal drip [ ] nasal congestion  CV: [ ] chest pain [ ] orthopnea [ ] palpitations [ ] murmur  Resp: [ ] cough [ ] shortness of breath [ ] dyspnea [ ] wheezing [ ] sputum [ ] hemoptysis  GI: [ ] nausea [ ] vomiting [ ] diarrhea [ ] constipation [ ] abd pain [ ] dysphagia   : [ ] dysuria [ ] nocturia [ ] hematuria [ ] increased urinary frequency  Musculoskeletal: [ ] back pain [ ] myalgias [ ] arthralgias [ ] fracture  Skin: [ ] rash [ ] itch  Neurological: [ ] headache [ ] dizziness [ ] syncope [ ] weakness [ ] numbness  Hematologic/Lymphatic: [ ] anemia [ ] bleeding problem  Allergic/Immunologic: [ ] itchy eyes [ ] nasal discharge [ ] hives [ ] angioedema  [ ] All other systems negative  [ ] Unable to assess ROS because ________    OBJECTIVE:  ICU Vital Signs Last 24 Hrs  T(C): 36.4 (15 Oct 2023 04:25), Max: 37.1 (14 Oct 2023 11:30)  T(F): 97.6 (15 Oct 2023 04:25), Max: 98.7 (14 Oct 2023 11:30)  HR: 76 (15 Oct 2023 07:16) (49 - 164)  BP: 133/84 (15 Oct 2023 07:16) (107/59 - 161/57)  BP(mean): 96 (15 Oct 2023 07:16) (71 - 136)  ABP: --  ABP(mean): --  RR: 21 (15 Oct 2023 07:16) (14 - 69)  SpO2: 97% (15 Oct 2023 07:16) (95% - 100%)    O2 Parameters below as of 14 Oct 2023 16:17  Patient On (Oxygen Delivery Method): room air              10-14 @ 07:01  -  10-15 @ 07:00  --------------------------------------------------------  IN: 361.2 mL / OUT: 2525 mL / NET: -2163.8 mL      CAPILLARY BLOOD GLUCOSE          PHYSICAL EXAM:  General:   HEENT:   Neck:   Respiratory:   Cardiovascular:   Abdomen:   Extremities:   Skin:   Neurological:  Psychiatry:    LINES:    HOSPITAL MEDICATIONS:  MEDICATIONS  (STANDING):  chlorhexidine 2% Cloths 1 Application(s) Topical <User Schedule>  dexMEDEtomidine Infusion 0.5 MICROgram(s)/kG/Hr (10.2 mL/Hr) IV Continuous <Continuous>  enoxaparin Injectable 40 milliGRAM(s) SubCutaneous every 24 hours  influenza   Vaccine 0.5 milliLiter(s) IntraMuscular once  pantoprazole  Injectable 40 milliGRAM(s) IV Push daily    MEDICATIONS  (PRN):  haloperidol    Injectable 5 milliGRAM(s) IntraMuscular every 6 hours PRN agitation  midazolam Injectable 2 milliGRAM(s) IV Push every 4 hours PRN agitation      LABS:                        13.6   10.09 )-----------( 163      ( 15 Oct 2023 04:05 )             43.4     Hgb Trend: 13.6<--, 11.2<--, 12.8<--, 11.7<--, 14.0<--  10-15    138  |  106  |  8   ----------------------------<  121<H>  4.0   |  25  |  1.00    Ca    8.8      15 Oct 2023 04:05  Phos  4.3     10-15  Mg     2.0     10-15    TPro  7.5  /  Alb  3.2<L>  /  TBili  1.3<H>  /  DBili  x   /  AST  47<H>  /  ALT  35  /  AlkPhos  66  10-15      Urinalysis Basic - ( 15 Oct 2023 04:05 )    Color: x / Appearance: x / SG: x / pH: x  Gluc: 121 mg/dL / Ketone: x  / Bili: x / Urobili: x   Blood: x / Protein: x / Nitrite: x   Leuk Esterase: x / RBC: x / WBC x   Sq Epi: x / Non Sq Epi: x / Bacteria: x            MICROBIOLOGY:     RADIOLOGY:  [ ] Reviewed and interpreted by me CHIEF COMPLAINT:    Interval Events:  restarted on precedex yesterday for worsening anxiety  received versed at 8 pm and 6AM  very anxious this morning    REVIEW OF SYSTEMS:  Constitutional: [- ] fevers [- ] chills [ ] weight loss [ ] weight gain  HEENT: [ ] dry eyes [ ] eye irritation [ ] postnasal drip [ ] nasal congestion  CV: [ -] chest pain [ -] orthopnea [ ] palpitations [ ] murmur  Resp: [ -] cough [- ] shortness of breath [ -] dyspnea [- ] wheezing [ ] sputum [ ] hemoptysis  GI: [- ] nausea [- ] vomiting [- ] diarrhea [- ] constipation [ ] abd pain [ ] dysphagia   : [ ] dysuria [ ] nocturia [ ] hematuria [ ] increased urinary frequency  Musculoskeletal: [ ] back pain [ ] myalgias [ ] arthralgias [ ] fracture  Skin: [ ] rash [ ] itch  Neurological: [ ] headache [ ] dizziness [ ] syncope [ ] weakness [ ] numbness  Hematologic/Lymphatic: [ ] anemia [ ] bleeding problem  Allergic/Immunologic: [ ] itchy eyes [ ] nasal discharge [ ] hives [ ] angioedema  [ x] All other systems negative    OBJECTIVE:  ICU Vital Signs Last 24 Hrs  T(C): 36.4 (15 Oct 2023 04:25), Max: 37.1 (14 Oct 2023 11:30)  T(F): 97.6 (15 Oct 2023 04:25), Max: 98.7 (14 Oct 2023 11:30)  HR: 76 (15 Oct 2023 07:16) (49 - 164)  BP: 133/84 (15 Oct 2023 07:16) (107/59 - 161/57)  BP(mean): 96 (15 Oct 2023 07:16) (71 - 136)  ABP: --  ABP(mean): --  RR: 21 (15 Oct 2023 07:16) (14 - 69)  SpO2: 97% (15 Oct 2023 07:16) (95% - 100%)    O2 Parameters below as of 14 Oct 2023 16:17  Patient On (Oxygen Delivery Method): room air              10-14 @ 07:01  -  10-15 @ 07:00  --------------------------------------------------------  IN: 361.2 mL / OUT: 2525 mL / NET: -2163.8 mL      CAPILLARY BLOOD GLUCOSE          PHYSICAL EXAM:  General: NAD, non toxic appearing  HEENT: dry MM, EOMI  Neck: supple  Respiratory: poor inspiratory effort  Cardiovascular: s1s2 RRR  Abdomen: soft, non tender, non distended  Extremities: warm, no edema or clubbing  Skin: intact  Neurological: no focal deficits  Psychiatry: pleasant but very anxious, AAOx3    LINES:  [facility]    HOSPITAL MEDICATIONS:  MEDICATIONS  (STANDING):  chlorhexidine 2% Cloths 1 Application(s) Topical <User Schedule>  dexMEDEtomidine Infusion 0.5 MICROgram(s)/kG/Hr (10.2 mL/Hr) IV Continuous <Continuous>  enoxaparin Injectable 40 milliGRAM(s) SubCutaneous every 24 hours  influenza   Vaccine 0.5 milliLiter(s) IntraMuscular once  pantoprazole  Injectable 40 milliGRAM(s) IV Push daily    MEDICATIONS  (PRN):  haloperidol    Injectable 5 milliGRAM(s) IntraMuscular every 6 hours PRN agitation  midazolam Injectable 2 milliGRAM(s) IV Push every 4 hours PRN agitation      LABS:                        13.6   10.09 )-----------( 163      ( 15 Oct 2023 04:05 )             43.4     Hgb Trend: 13.6<--, 11.2<--, 12.8<--, 11.7<--, 14.0<--  10-15    138  |  106  |  8   ----------------------------<  121<H>  4.0   |  25  |  1.00    Ca    8.8      15 Oct 2023 04:05  Phos  4.3     10-15  Mg     2.0     10-15    TPro  7.5  /  Alb  3.2<L>  /  TBili  1.3<H>  /  DBili  x   /  AST  47<H>  /  ALT  35  /  AlkPhos  66  10-15      Urinalysis Basic - ( 15 Oct 2023 04:05 )    Color: x / Appearance: x / SG: x / pH: x  Gluc: 121 mg/dL / Ketone: x  / Bili: x / Urobili: x   Blood: x / Protein: x / Nitrite: x   Leuk Esterase: x / RBC: x / WBC x   Sq Epi: x / Non Sq Epi: x / Bacteria: x            MICROBIOLOGY:     RADIOLOGY:  [ ] Reviewed and interpreted by me CHIEF COMPLAINT:    Interval Events:  restarted on precedex yesterday for worsening anxiety  received versed at 8 pm and 6AM  very anxious this morning    REVIEW OF SYSTEMS:  Constitutional: [- ] fevers [- ] chills [ ] weight loss [ ] weight gain  HEENT: [ ] dry eyes [ ] eye irritation [ ] postnasal drip [ ] nasal congestion  CV: [ -] chest pain [ -] orthopnea [ ] palpitations [ ] murmur  Resp: [ -] cough [- ] shortness of breath [ -] dyspnea [- ] wheezing [ ] sputum [ ] hemoptysis  GI: [- ] nausea [- ] vomiting [- ] diarrhea [- ] constipation [ ] abd pain [ ] dysphagia   : [ ] dysuria [ ] nocturia [ ] hematuria [ ] increased urinary frequency  Musculoskeletal: [ ] back pain [ ] myalgias [ ] arthralgias [ ] fracture  Skin: [ ] rash [ ] itch  Neurological: [ ] headache [ ] dizziness [ ] syncope [ ] weakness [ ] numbness  Hematologic/Lymphatic: [ ] anemia [ ] bleeding problem  Allergic/Immunologic: [ ] itchy eyes [ ] nasal discharge [ ] hives [ ] angioedema  [ x] All other systems negative    OBJECTIVE:  ICU Vital Signs Last 24 Hrs  T(C): 36.4 (15 Oct 2023 04:25), Max: 37.1 (14 Oct 2023 11:30)  T(F): 97.6 (15 Oct 2023 04:25), Max: 98.7 (14 Oct 2023 11:30)  HR: 76 (15 Oct 2023 07:16) (49 - 164)  BP: 133/84 (15 Oct 2023 07:16) (107/59 - 161/57)  BP(mean): 96 (15 Oct 2023 07:16) (71 - 136)  ABP: --  ABP(mean): --  RR: 21 (15 Oct 2023 07:16) (14 - 69)  SpO2: 97% (15 Oct 2023 07:16) (95% - 100%)    O2 Parameters below as of 14 Oct 2023 16:17  Patient On (Oxygen Delivery Method): room air              10-14 @ 07:01  -  10-15 @ 07:00  --------------------------------------------------------  IN: 361.2 mL / OUT: 2525 mL / NET: -2163.8 mL      CAPILLARY BLOOD GLUCOSE          PHYSICAL EXAM:  General: NAD, non toxic appearing  HEENT: dry MM, EOMI  Neck: supple  Respiratory: poor inspiratory effort  Cardiovascular: s1s2 RRR  Abdomen: soft, non tender, non distended  Extremities: warm, no edema or clubbing  Skin: intact  Neurological: no focal deficits  Psychiatry: pleasant but very anxious, AAOx3    LINES:  [facility]    HOSPITAL MEDICATIONS:  MEDICATIONS  (STANDING):  chlorhexidine 2% Cloths 1 Application(s) Topical <User Schedule>  dexMEDEtomidine Infusion 0.5 MICROgram(s)/kG/Hr (10.2 mL/Hr) IV Continuous <Continuous>  enoxaparin Injectable 40 milliGRAM(s) SubCutaneous every 24 hours  influenza   Vaccine 0.5 milliLiter(s) IntraMuscular once  pantoprazole  Injectable 40 milliGRAM(s) IV Push daily    MEDICATIONS  (PRN):  haloperidol    Injectable 5 milliGRAM(s) IntraMuscular every 6 hours PRN agitation  midazolam Injectable 2 milliGRAM(s) IV Push every 4 hours PRN agitation      LABS:                        13.6   10.09 )-----------( 163      ( 15 Oct 2023 04:05 )             43.4     Hgb Trend: 13.6<--, 11.2<--, 12.8<--, 11.7<--, 14.0<--  10-15    138  |  106  |  8   ----------------------------<  121<H>  4.0   |  25  |  1.00    Ca    8.8      15 Oct 2023 04:05  Phos  4.3     10-15  Mg     2.0     10-15    TPro  7.5  /  Alb  3.2<L>  /  TBili  1.3<H>  /  DBili  x   /  AST  47<H>  /  ALT  35  /  AlkPhos  66  10-15      Urinalysis Basic - ( 15 Oct 2023 04:05 )    Color: x / Appearance: x / SG: x / pH: x  Gluc: 121 mg/dL / Ketone: x  / Bili: x / Urobili: x   Blood: x / Protein: x / Nitrite: x   Leuk Esterase: x / RBC: x / WBC x   Sq Epi: x / Non Sq Epi: x / Bacteria: x            MICROBIOLOGY:     RADIOLOGY:  [ ] Reviewed and interpreted by me CHIEF COMPLAINT:    Interval Events:  restarted on precedex yesterday for worsening anxiety  received versed at 8 pm and 6AM  very anxious this morning    REVIEW OF SYSTEMS:  Constitutional: [- ] fevers [- ] chills [ ] weight loss [ ] weight gain  HEENT: [ ] dry eyes [ ] eye irritation [ ] postnasal drip [ ] nasal congestion  CV: [ -] chest pain [ -] orthopnea [ ] palpitations [ ] murmur  Resp: [ -] cough [- ] shortness of breath [ -] dyspnea [- ] wheezing [ ] sputum [ ] hemoptysis  GI: [- ] nausea [- ] vomiting [- ] diarrhea [- ] constipation [ ] abd pain [ ] dysphagia   : [ ] dysuria [ ] nocturia [ ] hematuria [ ] increased urinary frequency  Musculoskeletal: [ ] back pain [ ] myalgias [ ] arthralgias [ ] fracture  Skin: [ ] rash [ ] itch  Neurological: [ ] headache [ ] dizziness [ ] syncope [ ] weakness [ ] numbness  Hematologic/Lymphatic: [ ] anemia [ ] bleeding problem  Allergic/Immunologic: [ ] itchy eyes [ ] nasal discharge [ ] hives [ ] angioedema  [ x] All other systems negative    OBJECTIVE:  ICU Vital Signs Last 24 Hrs  T(C): 36.4 (15 Oct 2023 04:25), Max: 37.1 (14 Oct 2023 11:30)  T(F): 97.6 (15 Oct 2023 04:25), Max: 98.7 (14 Oct 2023 11:30)  HR: 76 (15 Oct 2023 07:16) (49 - 164)  BP: 133/84 (15 Oct 2023 07:16) (107/59 - 161/57)  BP(mean): 96 (15 Oct 2023 07:16) (71 - 136)  ABP: --  ABP(mean): --  RR: 21 (15 Oct 2023 07:16) (14 - 69)  SpO2: 97% (15 Oct 2023 07:16) (95% - 100%)    O2 Parameters below as of 14 Oct 2023 16:17  Patient On (Oxygen Delivery Method): room air              10-14 @ 07:01  -  10-15 @ 07:00  --------------------------------------------------------  IN: 361.2 mL / OUT: 2525 mL / NET: -2163.8 mL      CAPILLARY BLOOD GLUCOSE          PHYSICAL EXAM:  General: NAD, non toxic appearing  HEENT: dry MM, EOMI  Neck: supple  Respiratory: poor inspiratory effort  Cardiovascular: s1s2 RRR  Abdomen: soft, non tender, non distended  Extremities: warm, no edema or clubbing  Skin: intact  Neurological: no focal deficits  Psychiatry: pleasant but very anxious, AAOx3    LINES:  [facility]    HOSPITAL MEDICATIONS:  MEDICATIONS  (STANDING):  chlorhexidine 2% Cloths 1 Application(s) Topical <User Schedule>  dexMEDEtomidine Infusion 0.5 MICROgram(s)/kG/Hr (10.2 mL/Hr) IV Continuous <Continuous>  enoxaparin Injectable 40 milliGRAM(s) SubCutaneous every 24 hours  influenza   Vaccine 0.5 milliLiter(s) IntraMuscular once  pantoprazole  Injectable 40 milliGRAM(s) IV Push daily    MEDICATIONS  (PRN):  haloperidol    Injectable 5 milliGRAM(s) IntraMuscular every 6 hours PRN agitation  midazolam Injectable 2 milliGRAM(s) IV Push every 4 hours PRN agitation      LABS:                        13.6   10.09 )-----------( 163      ( 15 Oct 2023 04:05 )             43.4     Hgb Trend: 13.6<--, 11.2<--, 12.8<--, 11.7<--, 14.0<--  10-15    138  |  106  |  8   ----------------------------<  121<H>  4.0   |  25  |  1.00    Ca    8.8      15 Oct 2023 04:05  Phos  4.3     10-15  Mg     2.0     10-15    TPro  7.5  /  Alb  3.2<L>  /  TBili  1.3<H>  /  DBili  x   /  AST  47<H>  /  ALT  35  /  AlkPhos  66  10-15      Urinalysis Basic - ( 15 Oct 2023 04:05 )    Color: x / Appearance: x / SG: x / pH: x  Gluc: 121 mg/dL / Ketone: x  / Bili: x / Urobili: x   Blood: x / Protein: x / Nitrite: x   Leuk Esterase: x / RBC: x / WBC x   Sq Epi: x / Non Sq Epi: x / Bacteria: x            MICROBIOLOGY:     RADIOLOGY:  [ ] Reviewed and interpreted by me

## 2023-10-16 LAB
ALBUMIN SERPL ELPH-MCNC: 3.1 G/DL — LOW (ref 3.3–5)
ALP SERPL-CCNC: 58 U/L — SIGNIFICANT CHANGE UP (ref 40–120)
ALT FLD-CCNC: 43 U/L — SIGNIFICANT CHANGE UP (ref 12–78)
ANION GAP SERPL CALC-SCNC: 8 MMOL/L — SIGNIFICANT CHANGE UP (ref 5–17)
AST SERPL-CCNC: 69 U/L — HIGH (ref 15–37)
BASOPHILS # BLD AUTO: 0.06 K/UL — SIGNIFICANT CHANGE UP (ref 0–0.2)
BASOPHILS NFR BLD AUTO: 0.7 % — SIGNIFICANT CHANGE UP (ref 0–2)
BILIRUB SERPL-MCNC: 1.3 MG/DL — HIGH (ref 0.2–1.2)
BUN SERPL-MCNC: 9 MG/DL — SIGNIFICANT CHANGE UP (ref 7–23)
CALCIUM SERPL-MCNC: 8.3 MG/DL — LOW (ref 8.5–10.1)
CHLORIDE SERPL-SCNC: 104 MMOL/L — SIGNIFICANT CHANGE UP (ref 96–108)
CO2 SERPL-SCNC: 24 MMOL/L — SIGNIFICANT CHANGE UP (ref 22–31)
CREAT SERPL-MCNC: 0.99 MG/DL — SIGNIFICANT CHANGE UP (ref 0.5–1.3)
EGFR: 105 ML/MIN/1.73M2 — SIGNIFICANT CHANGE UP
EOSINOPHIL # BLD AUTO: 0.17 K/UL — SIGNIFICANT CHANGE UP (ref 0–0.5)
EOSINOPHIL NFR BLD AUTO: 2 % — SIGNIFICANT CHANGE UP (ref 0–6)
GLUCOSE SERPL-MCNC: 98 MG/DL — SIGNIFICANT CHANGE UP (ref 70–99)
HCT VFR BLD CALC: 41.2 % — SIGNIFICANT CHANGE UP (ref 39–50)
HGB BLD-MCNC: 12.8 G/DL — LOW (ref 13–17)
IMM GRANULOCYTES NFR BLD AUTO: 0.2 % — SIGNIFICANT CHANGE UP (ref 0–0.9)
LYMPHOCYTES # BLD AUTO: 1.84 K/UL — SIGNIFICANT CHANGE UP (ref 1–3.3)
LYMPHOCYTES # BLD AUTO: 22 % — SIGNIFICANT CHANGE UP (ref 13–44)
MAGNESIUM SERPL-MCNC: 1.7 MG/DL — SIGNIFICANT CHANGE UP (ref 1.6–2.6)
MCHC RBC-ENTMCNC: 22.3 PG — LOW (ref 27–34)
MCHC RBC-ENTMCNC: 31.1 G/DL — LOW (ref 32–36)
MCV RBC AUTO: 71.9 FL — LOW (ref 80–100)
MONOCYTES # BLD AUTO: 0.89 K/UL — SIGNIFICANT CHANGE UP (ref 0–0.9)
MONOCYTES NFR BLD AUTO: 10.6 % — SIGNIFICANT CHANGE UP (ref 2–14)
NEUTROPHILS # BLD AUTO: 5.4 K/UL — SIGNIFICANT CHANGE UP (ref 1.8–7.4)
NEUTROPHILS NFR BLD AUTO: 64.5 % — SIGNIFICANT CHANGE UP (ref 43–77)
NRBC # BLD: 0 /100 WBCS — SIGNIFICANT CHANGE UP (ref 0–0)
PHOSPHATE SERPL-MCNC: 4.4 MG/DL — SIGNIFICANT CHANGE UP (ref 2.5–4.5)
PLATELET # BLD AUTO: 163 K/UL — SIGNIFICANT CHANGE UP (ref 150–400)
POTASSIUM SERPL-MCNC: 3.8 MMOL/L — SIGNIFICANT CHANGE UP (ref 3.5–5.3)
POTASSIUM SERPL-SCNC: 3.8 MMOL/L — SIGNIFICANT CHANGE UP (ref 3.5–5.3)
PROT SERPL-MCNC: 7 GM/DL — SIGNIFICANT CHANGE UP (ref 6–8.3)
RBC # BLD: 5.73 M/UL — SIGNIFICANT CHANGE UP (ref 4.2–5.8)
RBC # FLD: 16.2 % — HIGH (ref 10.3–14.5)
SODIUM SERPL-SCNC: 136 MMOL/L — SIGNIFICANT CHANGE UP (ref 135–145)
TSH SERPL-MCNC: 1.26 UIU/ML — SIGNIFICANT CHANGE UP (ref 0.36–3.74)
WBC # BLD: 8.38 K/UL — SIGNIFICANT CHANGE UP (ref 3.8–10.5)
WBC # FLD AUTO: 8.38 K/UL — SIGNIFICANT CHANGE UP (ref 3.8–10.5)

## 2023-10-16 PROCEDURE — 99291 CRITICAL CARE FIRST HOUR: CPT

## 2023-10-16 RX ORDER — AMLODIPINE BESYLATE 2.5 MG/1
5 TABLET ORAL DAILY
Refills: 0 | Status: DISCONTINUED | OUTPATIENT
Start: 2023-10-16 | End: 2023-10-17

## 2023-10-16 RX ORDER — BENZTROPINE MESYLATE 1 MG
2 TABLET ORAL ONCE
Refills: 0 | Status: COMPLETED | OUTPATIENT
Start: 2023-10-16 | End: 2023-10-16

## 2023-10-16 RX ORDER — MAGNESIUM SULFATE 500 MG/ML
2 VIAL (ML) INJECTION ONCE
Refills: 0 | Status: COMPLETED | OUTPATIENT
Start: 2023-10-16 | End: 2023-10-16

## 2023-10-16 RX ORDER — DIPHENHYDRAMINE HCL 50 MG
25 CAPSULE ORAL ONCE
Refills: 0 | Status: COMPLETED | OUTPATIENT
Start: 2023-10-16 | End: 2023-10-16

## 2023-10-16 RX ORDER — METOPROLOL TARTRATE 50 MG
25 TABLET ORAL
Refills: 0 | Status: DISCONTINUED | OUTPATIENT
Start: 2023-10-16 | End: 2023-10-17

## 2023-10-16 RX ADMIN — DEXMEDETOMIDINE HYDROCHLORIDE IN 0.9% SODIUM CHLORIDE 10.2 MICROGRAM(S)/KG/HR: 4 INJECTION INTRAVENOUS at 06:06

## 2023-10-16 RX ADMIN — CHLORHEXIDINE GLUCONATE 1 APPLICATION(S): 213 SOLUTION TOPICAL at 12:06

## 2023-10-16 RX ADMIN — DEXMEDETOMIDINE HYDROCHLORIDE IN 0.9% SODIUM CHLORIDE 10.2 MICROGRAM(S)/KG/HR: 4 INJECTION INTRAVENOUS at 00:33

## 2023-10-16 RX ADMIN — Medication 25 MILLIGRAM(S): at 18:15

## 2023-10-16 RX ADMIN — PANTOPRAZOLE SODIUM 40 MILLIGRAM(S): 20 TABLET, DELAYED RELEASE ORAL at 12:06

## 2023-10-16 RX ADMIN — AMLODIPINE BESYLATE 5 MILLIGRAM(S): 2.5 TABLET ORAL at 15:22

## 2023-10-16 RX ADMIN — MIDAZOLAM HYDROCHLORIDE 2 MILLIGRAM(S): 1 INJECTION, SOLUTION INTRAMUSCULAR; INTRAVENOUS at 07:36

## 2023-10-16 RX ADMIN — Medication 25 MILLIGRAM(S): at 06:50

## 2023-10-16 RX ADMIN — Medication 25 MILLIGRAM(S): at 04:39

## 2023-10-16 RX ADMIN — MIDAZOLAM HYDROCHLORIDE 2 MILLIGRAM(S): 1 INJECTION, SOLUTION INTRAMUSCULAR; INTRAVENOUS at 02:21

## 2023-10-16 RX ADMIN — Medication 2 MILLIGRAM(S): at 10:15

## 2023-10-16 RX ADMIN — HALOPERIDOL DECANOATE 5 MILLIGRAM(S): 100 INJECTION INTRAMUSCULAR at 04:39

## 2023-10-16 RX ADMIN — ENOXAPARIN SODIUM 40 MILLIGRAM(S): 100 INJECTION SUBCUTANEOUS at 12:06

## 2023-10-16 RX ADMIN — Medication 2 MILLIGRAM(S): at 10:16

## 2023-10-16 RX ADMIN — Medication 25 GRAM(S): at 05:10

## 2023-10-16 NOTE — BH CONSULTATION LIAISON PROGRESS NOTE - NSBHCONSULTRECOMMENDOTHER_PSY_A_CORE FT
10/12/23: Neurology consultation indicated in this case. 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 24 hour video monitoring recommended   - if PNES, anticonvulsants are not indicated and could make symptoms worst actually  - at this time, too soon to give a definitive diagnosis and further work up is needed  - Patient CANNOT leave AMA at this time...will regain capacity once his mental status further improves and returns to baseline. He is aware the EEG stidy will be done   - Writer away next week on vacation so case will be followed by  Telepsychiatry

## 2023-10-16 NOTE — BH CONSULTATION LIAISON PROGRESS NOTE - NSBHATTESTBILLING_PSY_A_CORE
[id number] OBS or IP - moderate complexity OR 35-49 mins [id number] OBS or IP - moderate complexity OR 35-49 mins [id number] OBS or IP - moderate complexity OR 35-49 mins

## 2023-10-16 NOTE — PROGRESS NOTE ADULT - ASSESSMENT
Pt is a 29 yo BM with h/o bipolar disorder, s/p prior suicide attempt, h/o cutting and inpatient stay ~1 year ago who presented to [facility] 10/11 brought in by his friend for "bizarre behavior." Per his friend, pt has recently been stressed and had been smoking marijuana and became noticeably agitated causing his friends to become concerned he was going to have a psychiatric breakdown. Upon arrival tonight, pt fell outside hospital, hit his head and was unable to answer any questions. Prior to this, pt reportedly would not speak or move. While in ER pt reportedly became extremely agitated, thrashing his body around, hitting his head, swinging his arms / legs with code grey called. He was given ketamine IM, however was requiring 4 pt restraints and ER physician intubated for patient and staff safety. ICU dx: Psychotic episode with extreme agitation requiring sedation protocol and intubation. Overnight uneventful pt sedated on MV. This am sedation held, pt calm and weaned well on CPAP 5 + PS 5; pt extubated. Later as pt became more awake he became agitated, shaking and tried to get out of bed. Multiple members of the ICU team required to hold pt down and pt Rx'd with multiple pushes of Propofol 30mg and Haldol 5mg. Pt calmed down initially and then became agitated again + he put both hands in his mouth pulling his mandible down. Pt started on a Precedex drip and calmed down    Resp: Elevate head/ Place on CO2 monitor  HEME: DVT prophylaxis with sq Heparin  FEN: NPO until calm  Psych: Obtain psych hx/ Recommendations as per Psych consult  Neuro: Doubt pt had Sz   Pt is a 29 yo BM with h/o bipolar disorder, s/p prior suicide attempt, h/o cutting and inpatient stay ~1 year ago who presented to [facility] 10/11 brought in by his friend for "bizarre behavior." Per his friend, pt has recently been stressed and had been smoking marijuana and became noticeably agitated causing his friends to become concerned he was going to have a psychiatric breakdown. Upon arrival tonight, pt fell outside hospital, hit his head and was unable to answer any questions. Prior to this, pt reportedly would not speak or move. While in ER pt reportedly became extremely agitated, thrashing his body around, hitting his head, swinging his arms / legs with code grey called. He was given ketamine IM, however was requiring 4 pt restraints and ER physician intubated for patient and staff safety. ICU dx: Psychotic episode with extreme agitation requiring sedation protocol and intubation. Overnight uneventful pt sedated on MV. This am sedation held, pt calm and weaned well on CPAP 5 + PS 5; pt extubated. Later as pt became more awake he became agitated, shaking and tried to get out of bed. Multiple members of the ICU team required to hold pt down and pt Rx'd with multiple pushes of Propofol 30mg and Haldol 5mg. Pt calmed down initially and then became agitated again + he put both hands in his mouth pulling his mandible down. Pt started on a Precedex drip and calmed down    Resp: Elevate head/ Place on CO2 monitor  HEME: DVT prophylaxis with sq Heparin  FEN: NPO until calm  Psych: Obtain psych hx/ Recommendations as per Psych consult  Neuro: Doubt pt had Sz   Pt is a 27 yo BM with h/o bipolar disorder, s/p prior suicide attempt, h/o cutting and inpatient stay ~1 year ago who presented to [facility] 10/11 brought in by his friend for "bizarre behavior." Per his friend, pt has recently been stressed and had been smoking marijuana and became noticeably agitated causing his friends to become concerned he was going to have a psychiatric breakdown. Upon arrival tonight, pt fell outside hospital, hit his head and was unable to answer any questions. Prior to this, pt reportedly would not speak or move. While in ER pt reportedly became extremely agitated, thrashing his body around, hitting his head, swinging his arms / legs with code grey called. He was given ketamine IM, however was requiring 4 pt restraints and ER physician intubated for patient and staff safety. ICU dx: Psychotic episode with extreme agitation requiring sedation protocol and intubation. Overnight uneventful pt sedated on MV. This am sedation held, pt calm and weaned well on CPAP 5 + PS 5; pt extubated. Later as pt became more awake he became agitated, shaking and tried to get out of bed. Multiple members of the ICU team required to hold pt down and pt Rx'd with multiple pushes of Propofol 30mg and Haldol 5mg. Pt calmed down initially and then became agitated again + he put both hands in his mouth pulling his mandible down. Pt started on a Precedex drip and calmed down    Resp: Elevate head/ Place on CO2 monitor  HEME: DVT prophylaxis with sq Heparin  FEN: NPO until calm  Psych: Obtain psych hx/ Recommendations as per Psych consult  Neuro: Doubt pt had Sz   Pt is a 29 yo BM with h/o bipolar disorder, s/p prior suicide attempt, h/o cutting and inpatient stay ~1 year ago who presented to [facility] 10/11 brought in by his friend for "bizarre behavior." Per his friend, pt has recently been stressed and had been smoking marijuana and became noticeably agitated causing his friends to become concerned he was going to have a psychiatric breakdown. Upon 10/11 night, pt fell outside hospital, hit his head and was unable to answer any questions. Prior to this, pt reportedly would not speak or move. While in ER pt reportedly became extremely agitated, thrashing his body around, hitting his head, swinging his arms / legs with code grey called. He was given ketamine IM, however was requiring 4 pt restraints and ER physician intubated for patient and staff safety. ICU dx: Psychotic episode with extreme agitation requiring sedation protocol and intubation. 10/12 am sedation held, pt calm and weaned well on CPAP 5 + PS 5; pt extubated. Later as pt became more awake he became agitated, shaking and tried to get out of bed. Multiple members of the ICU team required to hold pt down and pt Rx'd with multiple pushes of Propofol 30mg and Haldol 5mg. Pt calmed down initially and then became agitated again + he put both hands in his mouth pulling his mandible down. Pt started on a Precedex drip and calmed down. Over this weekend pt with periods of agitation. This am pt diaphoretic, hyperventilating, restless with increased motor tone of all extremities; under the direction of telepsych pt  Rx'd with Ativan 2mg im and Cogentin 2mg im. This afternoon pt calm and sitting in a chair. R/o NMS vs extrapyramidal symptoms     Resp: Breathing comfortably on RA  HEME: DVT prophylaxis with Lovenox  FEN: Po diet only when pt is calm  Psych: Agree with holding Haldol/ Ativan prn im/ Cogentin im prn/ F/u as per Psych  Neuro: Doubt pt had Sz/ Neuro f/u noted   Pt is a 29 yo BM with h/o bipolar disorder, s/p prior suicide attempt, h/o cutting and inpatient stay ~1 year ago who presented to [facility] 10/11 brought in by his friend for "bizarre behavior." Per his friend, pt has recently been stressed and had been smoking marijuana and became noticeably agitated causing his friends to become concerned he was going to have a psychiatric breakdown. Upon 10/11 night, pt fell outside hospital, hit his head and was unable to answer any questions. Prior to this, pt reportedly would not speak or move. While in ER pt reportedly became extremely agitated, thrashing his body around, hitting his head, swinging his arms / legs with code grey called. He was given ketamine IM, however was requiring 4 pt restraints and ER physician intubated for patient and staff safety. ICU dx: Psychotic episode with extreme agitation requiring sedation protocol and intubation. 10/12 am sedation held, pt calm and weaned well on CPAP 5 + PS 5; pt extubated. Later as pt became more awake he became agitated, shaking and tried to get out of bed. Multiple members of the ICU team required to hold pt down and pt Rx'd with multiple pushes of Propofol 30mg and Haldol 5mg. Pt calmed down initially and then became agitated again + he put both hands in his mouth pulling his mandible down. Pt started on a Precedex drip and calmed down. Over this weekend pt with periods of agitation. This am pt diaphoretic, hyperventilating, restless with increased motor tone of all extremities; under the direction of telepsych pt  Rx'd with Ativan 2mg im and Cogentin 2mg im. This afternoon pt calm and sitting in a chair. R/o NMS vs extrapyramidal symptoms     Resp: Breathing comfortably on RA  HEME: DVT prophylaxis with Lovenox  FEN: Po diet only when pt is calm  Psych: Agree with holding Haldol/ Ativan prn im/ Cogentin im prn/ F/u as per Psych  Neuro: Doubt pt had Sz/ Neuro f/u noted   Pt is a 29 yo BM with h/o bipolar disorder, s/p prior suicide attempt, h/o cutting and inpatient stay ~1 year ago who presented to [facility] 10/11 brought in by his friend for "bizarre behavior." Per his friend, pt has recently been stressed and had been smoking marijuana and became noticeably agitated causing his friends to become concerned he was going to have a psychiatric breakdown. Upon 10/11 night, pt fell outside hospital, hit his head and was unable to answer any questions. Prior to this, pt reportedly would not speak or move. While in ER pt reportedly became extremely agitated, thrashing his body around, hitting his head, swinging his arms / legs with code grey called. He was given ketamine IM, however was requiring 4 pt restraints and ER physician intubated for patient and staff safety. ICU dx: Psychotic episode with extreme agitation requiring sedation protocol and intubation. 10/12 am sedation held, pt calm and weaned well on CPAP 5 + PS 5; pt extubated. Later as pt became more awake he became agitated, shaking and tried to get out of bed. Multiple members of the ICU team required to hold pt down and pt Rx'd with multiple pushes of Propofol 30mg and Haldol 5mg. Pt calmed down initially and then became agitated again + he put both hands in his mouth pulling his mandible down. Pt started on a Precedex drip and calmed down. Over this weekend pt with periods of agitation. This am pt diaphoretic, hyperventilating, restless with increased motor tone of all extremities; under the direction of telepsych pt  Rx'd with Ativan 2mg im and Cogentin 2mg im. This afternoon pt calm and sitting in a chair. R/o NMS vs extrapyramidal symptoms     Resp: Breathing comfortably on RA  HEME: DVT prophylaxis with Lovenox  FEN: Po diet only when pt is calm  Psych: Agree with holding Haldol/ Ativan prn im/ Cogentin im prn/ F/u as per Psych  Neuro: Doubt pt had Sz/ Neuro f/u noted

## 2023-10-16 NOTE — PROGRESS NOTE ADULT - SUBJECTIVE AND OBJECTIVE BOX
HPI:  Mr. Ariza is a 28 year old male with a PMH of bipolar disorder, s/p prior suicide attempt, h/o cutting and inpatient stay ~1 year ago who presented to [facility] ED earlier today brought in by his friend for "bizarre behavior." Per his friend, pt has recently been stressed and had been smoking marijuana and became noticeably agitated causing his friends to become concerned he was going to have a psychiatric breakdown. Upon arrival tonight, pt fell outside hospital, hit his head and was unable to answer any questions. Prior to this, pt reportedly would not speak or move. While in ED, pt reportedly became extremely agitated, thrashing his body around, hitting his head, swinging his arms / legs with code grey called. He was given ketamine IM, however was requiring 4 pt restraints and ED physician intubated for patient and staff safety. ICU team consulted.    CT head negative, but CT spine noting Cervical intervertebral disc spaces show disc degeneration and spondylosis at C2-3 through C5-6 with mild lossof disc height. Small LEFT paramedian disc herniation noted at C2-3 which indents the ventral cord. Narrowing of the BILATERAL C3-4, C4-5 neural foramina due to uncovertebral spurring and facet osteophytic hypertrophy. ED PA d/w ortho / spine with no immediate intervention required.       (11 Oct 2023 21:17)      24 hr events:      ## Labs:  CBC:                        12.8   8.38  )-----------( 163      ( 16 Oct 2023 02:10 )             41.2     Chem:  10-16    136  |  104  |  9   ----------------------------<  98  3.8   |  24  |  0.99    Ca    8.3<L>      16 Oct 2023 02:10  Phos  4.4     10-16  Mg     1.7     10-16    TPro  7.0  /  Alb  3.1<L>  /  TBili  1.3<H>  /  DBili  x   /  AST  69<H>  /  ALT  43  /  AlkPhos  58  10-16    Coags:          ## Imaging:    ## Medications:          enoxaparin Injectable 40 milliGRAM(s) SubCutaneous every 24 hours    pantoprazole  Injectable 40 milliGRAM(s) IV Push daily    dexMEDEtomidine Infusion 0.5 MICROgram(s)/kG/Hr IV Continuous <Continuous>  midazolam Injectable 2 milliGRAM(s) IV Push every 4 hours PRN      ## Vitals:  T(C): 37 (10-16-23 @ 07:52), Max: 37 (10-16-23 @ 07:52)  HR: 106 (10-16-23 @ 12:00) (46 - 185)  BP: 141/101 (10-16-23 @ 12:00) (114/70 - 182/114)  BP(mean): 114 (10-16-23 @ 12:00) (83 - 146)  RR: 17 (10-16-23 @ 12:00) (10 - 57)  SpO2: 98% (10-16-23 @ 12:00) (97% - 100%)  Wt(kg): --  Vent:   ABG:       10-15 @ 07:01  -  10-16 @ 07:00  --------------------------------------------------------  IN: 1261.9 mL / OUT: 1450 mL / NET: -188.1 mL          ## P/E:  Gen: lying comfortably in bed in no apparent distress  Mouth: (+) ETT/ OGT  Lungs: CTA  Heart: RRR  Abd: Soft/(+)BS  Ext: No edema  Neuro: Sedated    CENTRAL LINE: [ ] YES [ ] NO  LOCATION:   DATE INSERTED:  REMOVE: [ ] YES [ ] NO      FREEMAN: [ ] YES [ ] NO    DATE INSERTED:  REMOVE:  [ ] YES [ ] NO      A-LINE:  [ ] YES [ ] NO  LOCATION:   DATE INSERTED:  REMOVE:  [ ] YES [ ] NO  EXPLAIN:    CODE STATUS: [x ] full code  [ ] DNR  [ ] DNI  [ ] [facility]  Goals of care discussion: [ ] yes    HPI:  Mr. Ariza is a 28 year old male with a PMH of bipolar disorder, s/p prior suicide attempt, h/o cutting and inpatient stay ~1 year ago who presented to [facility] ED earlier today brought in by his friend for "bizarre behavior." Per his friend, pt has recently been stressed and had been smoking marijuana and became noticeably agitated causing his friends to become concerned he was going to have a psychiatric breakdown. Upon arrival tonight, pt fell outside hospital, hit his head and was unable to answer any questions. Prior to this, pt reportedly would not speak or move. While in ED, pt reportedly became extremely agitated, thrashing his body around, hitting his head, swinging his arms / legs with code grey called. He was given ketamine IM, however was requiring 4 pt restraints and ED physician intubated for patient and staff safety. ICU team consulted.    CT head negative, but CT spine noting Cervical intervertebral disc spaces show disc degeneration and spondylosis at C2-3 through C5-6 with mild lossof disc height. Small LEFT paramedian disc herniation noted at C2-3 which indents the ventral cord. Narrowing of the BILATERAL C3-4, C4-5 neural foramina due to uncovertebral spurring and facet osteophytic hypertrophy. ED PA d/w ortho / spine with no immediate intervention required.       (11 Oct 2023 21:17)      24 hr events:      ## Labs:  CBC:                        12.8   8.38  )-----------( 163      ( 16 Oct 2023 02:10 )             41.2     Chem:  10-16    136  |  104  |  9   ----------------------------<  98  3.8   |  24  |  0.99    Ca    8.3<L>      16 Oct 2023 02:10  Phos  4.4     10-16  Mg     1.7     10-16    TPro  7.0  /  Alb  3.1<L>  /  TBili  1.3<H>  /  DBili  x   /  AST  69<H>  /  ALT  43  /  AlkPhos  58  10-16    Coags:          ## Imaging:    ## Medications:          enoxaparin Injectable 40 milliGRAM(s) SubCutaneous every 24 hours    pantoprazole  Injectable 40 milliGRAM(s) IV Push daily    dexMEDEtomidine Infusion 0.5 MICROgram(s)/kG/Hr IV Continuous <Continuous>  midazolam Injectable 2 milliGRAM(s) IV Push every 4 hours PRN      ## Vitals:  T(C): 37 (10-16-23 @ 07:52), Max: 37 (10-16-23 @ 07:52)  HR: 106 (10-16-23 @ 12:00) (46 - 185)  BP: 141/101 (10-16-23 @ 12:00) (114/70 - 182/114)  BP(mean): 114 (10-16-23 @ 12:00) (83 - 146)  RR: 17 (10-16-23 @ 12:00) (10 - 57)  SpO2: 98% (10-16-23 @ 12:00) (97% - 100%)  Wt(kg): --  Vent:   ABG:       10-15 @ 07:01  -  10-16 @ 07:00  --------------------------------------------------------  IN: 1261.9 mL / OUT: 1450 mL / NET: -188.1 mL          ## P/E:  Gen: lying comfortably in bed in no apparent distress  Mouth: (+) ETT/ OGT  Lungs: CTA  Heart: RRR  Abd: Soft/(+)BS  Ext: No edema  Neuro: Sedated    CENTRAL LINE: [ ] YES [ ] NO  LOCATION:   DATE INSERTED:  REMOVE: [ ] YES [ ] NO      FREEMAN: [ ] YES [ ] NO    DATE INSERTED:  REMOVE:  [ ] YES [ ] NO      A-LINE:  [ ] YES [ ] NO  LOCATION:   DATE INSERTED:  REMOVE:  [ ] YES [ ] NO  EXPLAIN:    CODE STATUS: [x ] full code  [ ] DNR  [ ] DNI  [ ] [facility]  Goals of care discussion: [ ] yes    HPI:  Mr. Ariza is a 28 year old male with a PMH of bipolar disorder, s/p prior suicide attempt, h/o cutting and inpatient stay ~1 year ago who presented to [facility] ED earlier today brought in by his friend for "bizarre behavior." Per his friend, pt has recently been stressed and had been smoking marijuana and became noticeably agitated causing his friends to become concerned he was going to have a psychiatric breakdown. Upon arrival tonight, pt fell outside hospital, hit his head and was unable to answer any questions. Prior to this, pt reportedly would not speak or move. While in ED, pt reportedly became extremely agitated, thrashing his body around, hitting his head, swinging his arms / legs with code grey called. He was given ketamine IM, however was requiring 4 pt restraints and ED physician intubated for patient and staff safety. ICU team consulted.    CT head negative, but CT spine noting Cervical intervertebral disc spaces show disc degeneration and spondylosis at C2-3 through C5-6 with mild lossof disc height. Small LEFT paramedian disc herniation noted at C2-3 which indents the ventral cord. Narrowing of the BILATERAL C3-4, C4-5 neural foramina due to uncovertebral spurring and facet osteophytic hypertrophy. ED PA d/w ortho / spine with no immediate intervention required.       (11 Oct 2023 21:17)      24 hr events:      ## Labs:  CBC:                        12.8   8.38  )-----------( 163      ( 16 Oct 2023 02:10 )             41.2     Chem:  10-16    136  |  104  |  9   ----------------------------<  98  3.8   |  24  |  0.99    Ca    8.3<L>      16 Oct 2023 02:10  Phos  4.4     10-16  Mg     1.7     10-16    TPro  7.0  /  Alb  3.1<L>  /  TBili  1.3<H>  /  DBili  x   /  AST  69<H>  /  ALT  43  /  AlkPhos  58  10-16    Coags:          ## Imaging:    ## Medications:          enoxaparin Injectable 40 milliGRAM(s) SubCutaneous every 24 hours    pantoprazole  Injectable 40 milliGRAM(s) IV Push daily    dexMEDEtomidine Infusion 0.5 MICROgram(s)/kG/Hr IV Continuous <Continuous>  midazolam Injectable 2 milliGRAM(s) IV Push every 4 hours PRN      ## Vitals:  T(C): 37 (10-16-23 @ 07:52), Max: 37 (10-16-23 @ 07:52)  HR: 106 (10-16-23 @ 12:00) (46 - 185)  BP: 141/101 (10-16-23 @ 12:00) (114/70 - 182/114)  BP(mean): 114 (10-16-23 @ 12:00) (83 - 146)  RR: 17 (10-16-23 @ 12:00) (10 - 57)  SpO2: 98% (10-16-23 @ 12:00) (97% - 100%)  Wt(kg): --  Vent:   ABG:       10-15 @ 07:01  -  10-16 @ 07:00  --------------------------------------------------------  IN: 1261.9 mL / OUT: 1450 mL / NET: -188.1 mL          ## P/E:  Gen: lying comfortably in bed in no apparent distress  Mouth: (+) ETT/ OGT  Lungs: CTA  Heart: RRR  Abd: Soft/(+)BS  Ext: No edema  Neuro: Sedated    CENTRAL LINE: [ ] YES [ ] NO  LOCATION:   DATE INSERTED:  REMOVE: [ ] YES [ ] NO      FREEMAN: [ ] YES [ ] NO    DATE INSERTED:  REMOVE:  [ ] YES [ ] NO      A-LINE:  [ ] YES [ ] NO  LOCATION:   DATE INSERTED:  REMOVE:  [ ] YES [ ] NO  EXPLAIN:    CODE STATUS: [x ] full code  [ ] DNR  [ ] DNI  [ ] [facility]  Goals of care discussion: [ ] yes    HPI: Pt is a 27 yo BM with h/o bipolar disorder, s/p prior suicide attempt, h/o cutting and inpatient stay ~1 year ago who presented to [facility] 10/11 brought in by his friend for "bizarre behavior." Per his friend, pt has recently been stressed and had been smoking marijuana and became noticeably agitated causing his friends to become concerned he was going to have a psychiatric breakdown. Upon 10/11 night, pt fell outside hospital, hit his head and was unable to answer any questions. Prior to this, pt reportedly would not speak or move. While in ER pt reportedly became extremely agitated, thrashing his body around, hitting his head, swinging his arms / legs with code grey called. He was given ketamine IM, however was requiring 4 pt restraints and ER physician intubated for patient and staff safety. ICU dx: Psychotic episode with extreme agitation requiring sedation protocol and intubation. 10/12 am sedation held, pt calm and weaned well on CPAP 5 + PS 5; pt extubated. Later as pt became more awake he became agitated, shaking and tried to get out of bed. Multiple members of the ICU team required to hold pt down and pt Rx'd with multiple pushes of Propofol 30mg and Haldol 5mg. Pt calmed down initially and then became agitated again + he put both hands in his mouth pulling his mandible down. Pt started on a Precedex drip and calmed down. Over this weekend pt with periods of agitation. This am pt diaphoretic, hyperventilating, restless with increased motor tone of all extremities; under the direction of telepsych pt Rx'd with Ativan 2mg im and Cogentin 2mg im ->this afternoon pt calm and sitting in a chair. R/o NMS vs extrapyramidal symptoms       ## Labs:  CBC:                        12.8   8.38  )-----------( 163      ( 16 Oct 2023 02:10 )             41.2     Chem:  10-16    136  |  104  |  9   ----------------------------<  98  3.8   |  24  |  0.99    Ca    8.3<L>      16 Oct 2023 02:10  Phos  4.4     10-16  Mg     1.7     10-16    TPro  7.0  /  Alb  3.1<L>  /  TBili  1.3<H>  /  DBili  x   /  AST  69<H>  /  ALT  43  /  AlkPhos  58  10-16    Coags:          ## Imaging:    ## Medications:          enoxaparin Injectable 40 milliGRAM(s) SubCutaneous every 24 hours    pantoprazole  Injectable 40 milliGRAM(s) IV Push daily    dexMEDEtomidine Infusion 0.5 MICROgram(s)/kG/Hr IV Continuous <Continuous>  midazolam Injectable 2 milliGRAM(s) IV Push every 4 hours PRN      ## Vitals:  T(C): 37 (10-16-23 @ 07:52), Max: 37 (10-16-23 @ 07:52)  HR: 106 (10-16-23 @ 12:00) (46 - 185)  BP: 141/101 (10-16-23 @ 12:00) (114/70 - 182/114)  BP(mean): 114 (10-16-23 @ 12:00) (83 - 146)  RR: 17 (10-16-23 @ 12:00) (10 - 57)  SpO2: 98% (10-16-23 @ 12:00) (97% - 100%)  Wt(kg): --  Vent:   ABG:       10-15 @ 07:01  -  10-16 @ 07:00  --------------------------------------------------------  IN: 1261.9 mL / OUT: 1450 mL / NET: -188.1 mL          ## P/E:  Gen: lying comfortably in bed in no apparent distress  Mouth: (+) ETT/ OGT  Lungs: CTA  Heart: RRR  Abd: Soft/(+)BS  Ext: No edema  Neuro: Sedated    CENTRAL LINE: [ ] YES [ ] NO  LOCATION:   DATE INSERTED:  REMOVE: [ ] YES [ ] NO      FREEMAN: [ ] YES [ ] NO    DATE INSERTED:  REMOVE:  [ ] YES [ ] NO      A-LINE:  [ ] YES [ ] NO  LOCATION:   DATE INSERTED:  REMOVE:  [ ] YES [ ] NO  EXPLAIN:    CODE STATUS: [x ] full code  [ ] DNR  [ ] DNI  [ ] [facility]  Goals of care discussion: [ ] yes    HPI: Pt is a 27 yo BM with h/o bipolar disorder, s/p prior suicide attempt, h/o cutting and inpatient stay ~1 year ago who presented to [facility] 10/11 brought in by his friend for "bizarre behavior." Per his friend, pt has recently been stressed and had been smoking marijuana and became noticeably agitated causing his friends to become concerned he was going to have a psychiatric breakdown. Upon 10/11 night, pt fell outside hospital, hit his head and was unable to answer any questions. Prior to this, pt reportedly would not speak or move. While in ER pt reportedly became extremely agitated, thrashing his body around, hitting his head, swinging his arms / legs with code grey called. He was given ketamine IM, however was requiring 4 pt restraints and ER physician intubated for patient and staff safety. ICU dx: Psychotic episode with extreme agitation requiring sedation protocol and intubation. 10/12 am sedation held, pt calm and weaned well on CPAP 5 + PS 5; pt extubated. Later as pt became more awake he became agitated, shaking and tried to get out of bed. Multiple members of the ICU team required to hold pt down and pt Rx'd with multiple pushes of Propofol 30mg and Haldol 5mg. Pt calmed down initially and then became agitated again + he put both hands in his mouth pulling his mandible down. Pt started on a Precedex drip and calmed down. Over this weekend pt with periods of agitation. This am pt diaphoretic, hyperventilating, restless with increased motor tone of all extremities; under the direction of telepsych pt Rx'd with Ativan 2mg im and Cogentin 2mg im ->this afternoon pt calm and sitting in a chair. R/o NMS vs extrapyramidal symptoms       ## Labs:  CBC:                        12.8   8.38  )-----------( 163      ( 16 Oct 2023 02:10 )             41.2     Chem:  10-16    136  |  104  |  9   ----------------------------<  98  3.8   |  24  |  0.99    Ca    8.3<L>      16 Oct 2023 02:10  Phos  4.4     10-16  Mg     1.7     10-16    TPro  7.0  /  Alb  3.1<L>  /  TBili  1.3<H>  /  DBili  x   /  AST  69<H>  /  ALT  43  /  AlkPhos  58  10-16    Coags:          ## Imaging:    ## Medications:          enoxaparin Injectable 40 milliGRAM(s) SubCutaneous every 24 hours    pantoprazole  Injectable 40 milliGRAM(s) IV Push daily    dexMEDEtomidine Infusion 0.5 MICROgram(s)/kG/Hr IV Continuous <Continuous>  midazolam Injectable 2 milliGRAM(s) IV Push every 4 hours PRN      ## Vitals:  T(C): 37 (10-16-23 @ 07:52), Max: 37 (10-16-23 @ 07:52)  HR: 106 (10-16-23 @ 12:00) (46 - 185)  BP: 141/101 (10-16-23 @ 12:00) (114/70 - 182/114)  BP(mean): 114 (10-16-23 @ 12:00) (83 - 146)  RR: 17 (10-16-23 @ 12:00) (10 - 57)  SpO2: 98% (10-16-23 @ 12:00) (97% - 100%)  Wt(kg): --  Vent:   ABG:       10-15 @ 07:01  -  10-16 @ 07:00  --------------------------------------------------------  IN: 1261.9 mL / OUT: 1450 mL / NET: -188.1 mL          ## P/E:  Gen: lying comfortably in bed in no apparent distress  Mouth: (+) ETT/ OGT  Lungs: CTA  Heart: RRR  Abd: Soft/(+)BS  Ext: No edema  Neuro: Sedated    CENTRAL LINE: [ ] YES [ ] NO  LOCATION:   DATE INSERTED:  REMOVE: [ ] YES [ ] NO      FREEMAN: [ ] YES [ ] NO    DATE INSERTED:  REMOVE:  [ ] YES [ ] NO      A-LINE:  [ ] YES [ ] NO  LOCATION:   DATE INSERTED:  REMOVE:  [ ] YES [ ] NO  EXPLAIN:    CODE STATUS: [x ] full code  [ ] DNR  [ ] DNI  [ ] [facility]  Goals of care discussion: [ ] yes    HPI: Pt is a 29 yo BM with h/o bipolar disorder, s/p prior suicide attempt, h/o cutting and inpatient stay ~1 year ago who presented to [facility] 10/11 brought in by his friend for "bizarre behavior." Per his friend, pt has recently been stressed and had been smoking marijuana and became noticeably agitated causing his friends to become concerned he was going to have a psychiatric breakdown. Upon 10/11 night, pt fell outside hospital, hit his head and was unable to answer any questions. Prior to this, pt reportedly would not speak or move. While in ER pt reportedly became extremely agitated, thrashing his body around, hitting his head, swinging his arms / legs with code grey called. He was given ketamine IM, however was requiring 4 pt restraints and ER physician intubated for patient and staff safety. ICU dx: Psychotic episode with extreme agitation requiring sedation protocol and intubation. 10/12 am sedation held, pt calm and weaned well on CPAP 5 + PS 5; pt extubated. Later as pt became more awake he became agitated, shaking and tried to get out of bed. Multiple members of the ICU team required to hold pt down and pt Rx'd with multiple pushes of Propofol 30mg and Haldol 5mg. Pt calmed down initially and then became agitated again + he put both hands in his mouth pulling his mandible down. Pt started on a Precedex drip and calmed down. Over this weekend pt with periods of agitation. This am pt diaphoretic, hyperventilating, restless with increased motor tone of all extremities; under the direction of telepsych pt Rx'd with Ativan 2mg im and Cogentin 2mg im ->this afternoon pt calm and sitting in a chair. R/o NMS vs extrapyramidal symptoms       ## Labs:  CBC:                        12.8   8.38  )-----------( 163      ( 16 Oct 2023 02:10 )             41.2     Chem:  10-16    136  |  104  |  9   ----------------------------<  98  3.8   |  24  |  0.99    Ca    8.3<L>      16 Oct 2023 02:10  Phos  4.4     10-16  Mg     1.7     10-16    TPro  7.0  /  Alb  3.1<L>  /  TBili  1.3<H>  /  DBili  x   /  AST  69<H>  /  ALT  43  /  AlkPhos  58  10-16    Coags:          ## Imaging:    ## Medications:          enoxaparin Injectable 40 milliGRAM(s) SubCutaneous every 24 hours    pantoprazole  Injectable 40 milliGRAM(s) IV Push daily    dexMEDEtomidine Infusion 0.5 MICROgram(s)/kG/Hr IV Continuous <Continuous>  midazolam Injectable 2 milliGRAM(s) IV Push every 4 hours PRN      ## Vitals:  T(C): 37 (10-16-23 @ 07:52), Max: 37 (10-16-23 @ 07:52)  HR: 106 (10-16-23 @ 12:00) (46 - 185)  BP: 141/101 (10-16-23 @ 12:00) (114/70 - 182/114)  BP(mean): 114 (10-16-23 @ 12:00) (83 - 146)  RR: 17 (10-16-23 @ 12:00) (10 - 57)  SpO2: 98% (10-16-23 @ 12:00) (97% - 100%)  Wt(kg): --  Vent:   ABG:       10-15 @ 07:01  -  10-16 @ 07:00  --------------------------------------------------------  IN: 1261.9 mL / OUT: 1450 mL / NET: -188.1 mL          ## P/E:  Gen: lying comfortably in bed in no apparent distress  Mouth: (+) ETT/ OGT  Lungs: CTA  Heart: RRR  Abd: Soft/(+)BS  Ext: No edema  Neuro: Sedated    CENTRAL LINE: [ ] YES [ ] NO  LOCATION:   DATE INSERTED:  REMOVE: [ ] YES [ ] NO      FREEMAN: [ ] YES [ ] NO    DATE INSERTED:  REMOVE:  [ ] YES [ ] NO      A-LINE:  [ ] YES [ ] NO  LOCATION:   DATE INSERTED:  REMOVE:  [ ] YES [ ] NO  EXPLAIN:    CODE STATUS: [x ] full code  [ ] DNR  [ ] DNI  [ ] [facility]  Goals of care discussion: [ ] yes    HPI: Pt is a 27 yo BM with h/o bipolar disorder, s/p prior suicide attempt, h/o cutting and inpatient stay ~1 year ago who presented to [facility] 10/11 brought in by his friend for "bizarre behavior." Per his friend, pt has recently been stressed and had been smoking marijuana and became noticeably agitated causing his friends to become concerned he was going to have a psychiatric breakdown. Upon 10/11 night, pt fell outside hospital, hit his head and was unable to answer any questions. Prior to this, pt reportedly would not speak or move. While in ER pt reportedly became extremely agitated, thrashing his body around, hitting his head, swinging his arms / legs with code grey called. He was given ketamine IM, however was requiring 4 pt restraints and ER physician intubated for patient and staff safety. ICU dx: Psychotic episode with extreme agitation requiring sedation protocol and intubation. 10/12 am sedation held, pt calm and weaned well on CPAP 5 + PS 5; pt extubated. Later as pt became more awake he became agitated, shaking and tried to get out of bed. Multiple members of the ICU team required to hold pt down and pt Rx'd with multiple pushes of Propofol 30mg and Haldol 5mg. Pt calmed down initially and then became agitated again + he put both hands in his mouth pulling his mandible down. Pt started on a Precedex drip and calmed down. Over this weekend pt with periods of agitation. This am pt diaphoretic, hyperventilating, restless with increased motor tone of all extremities; under the direction of telepsych pt Rx'd with Ativan 2mg im and Cogentin 2mg im ->this afternoon pt calm and sitting in a chair. R/o NMS vs extrapyramidal symptoms       ## Labs:  CBC:                        12.8   8.38  )-----------( 163      ( 16 Oct 2023 02:10 )             41.2     Chem:  10-16    136  |  104  |  9   ----------------------------<  98  3.8   |  24  |  0.99    Ca    8.3<L>      16 Oct 2023 02:10  Phos  4.4     10-16  Mg     1.7     10-16    TPro  7.0  /  Alb  3.1<L>  /  TBili  1.3<H>  /  DBili  x   /  AST  69<H>  /  ALT  43  /  AlkPhos  58  10-16    Coags:          ## Imaging:    ## Medications:          enoxaparin Injectable 40 milliGRAM(s) SubCutaneous every 24 hours    pantoprazole  Injectable 40 milliGRAM(s) IV Push daily    dexMEDEtomidine Infusion 0.5 MICROgram(s)/kG/Hr IV Continuous <Continuous>  midazolam Injectable 2 milliGRAM(s) IV Push every 4 hours PRN      ## Vitals:  T(C): 37 (10-16-23 @ 07:52), Max: 37 (10-16-23 @ 07:52)  HR: 106 (10-16-23 @ 12:00) (46 - 185)  BP: 141/101 (10-16-23 @ 12:00) (114/70 - 182/114)  BP(mean): 114 (10-16-23 @ 12:00) (83 - 146)  RR: 17 (10-16-23 @ 12:00) (10 - 57)  SpO2: 98% (10-16-23 @ 12:00) (97% - 100%)  Wt(kg): --  Vent:   ABG:       10-15 @ 07:01  -  10-16 @ 07:00  --------------------------------------------------------  IN: 1261.9 mL / OUT: 1450 mL / NET: -188.1 mL          ## P/E:  Gen: pt diaphoretic with increased motor tone and restless  Lungs: CTA  Heart: Tachy  Abd: Soft/(+)BS  Ext: No edema  Neuro: Able to answer questions, increased motor tone and restless    CENTRAL LINE: [ ] YES [ ] NO  LOCATION:   DATE INSERTED:  REMOVE: [ ] YES [ ] NO      FREEMAN: [ ] YES [ ] NO    DATE INSERTED:  REMOVE:  [ ] YES [ ] NO      A-LINE:  [ ] YES [ ] NO  LOCATION:   DATE INSERTED:  REMOVE:  [ ] YES [ ] NO  EXPLAIN:    CODE STATUS: [x ] full code  [ ] DNR  [ ] DNI  [ ] [facility]  Goals of care discussion: [ ] yes    HPI: Pt is a 27 yo BM with h/o bipolar disorder, s/p prior suicide attempt, h/o cutting and inpatient stay ~1 year ago who presented to [facility] 10/11 brought in by his friend for "bizarre behavior." Per his friend, pt has recently been stressed and had been smoking marijuana and became noticeably agitated causing his friends to become concerned he was going to have a psychiatric breakdown. Upon 10/11 night, pt fell outside hospital, hit his head and was unable to answer any questions. Prior to this, pt reportedly would not speak or move. While in ER pt reportedly became extremely agitated, thrashing his body around, hitting his head, swinging his arms / legs with code grey called. He was given ketamine IM, however was requiring 4 pt restraints and ER physician intubated for patient and staff safety. ICU dx: Psychotic episode with extreme agitation requiring sedation protocol and intubation. 10/12 am sedation held, pt calm and weaned well on CPAP 5 + PS 5; pt extubated. Later as pt became more awake he became agitated, shaking and tried to get out of bed. Multiple members of the ICU team required to hold pt down and pt Rx'd with multiple pushes of Propofol 30mg and Haldol 5mg. Pt calmed down initially and then became agitated again + he put both hands in his mouth pulling his mandible down. Pt started on a Precedex drip and calmed down. Over this weekend pt with periods of agitation. This am pt diaphoretic, hyperventilating, restless with increased motor tone of all extremities; under the direction of telepsych pt Rx'd with Ativan 2mg im and Cogentin 2mg im ->this afternoon pt calm and sitting in a chair. R/o NMS vs extrapyramidal symptoms       ## Labs:  CBC:                        12.8   8.38  )-----------( 163      ( 16 Oct 2023 02:10 )             41.2     Chem:  10-16    136  |  104  |  9   ----------------------------<  98  3.8   |  24  |  0.99    Ca    8.3<L>      16 Oct 2023 02:10  Phos  4.4     10-16  Mg     1.7     10-16    TPro  7.0  /  Alb  3.1<L>  /  TBili  1.3<H>  /  DBili  x   /  AST  69<H>  /  ALT  43  /  AlkPhos  58  10-16    Coags:          ## Imaging:    ## Medications:          enoxaparin Injectable 40 milliGRAM(s) SubCutaneous every 24 hours    pantoprazole  Injectable 40 milliGRAM(s) IV Push daily    dexMEDEtomidine Infusion 0.5 MICROgram(s)/kG/Hr IV Continuous <Continuous>  midazolam Injectable 2 milliGRAM(s) IV Push every 4 hours PRN      ## Vitals:  T(C): 37 (10-16-23 @ 07:52), Max: 37 (10-16-23 @ 07:52)  HR: 106 (10-16-23 @ 12:00) (46 - 185)  BP: 141/101 (10-16-23 @ 12:00) (114/70 - 182/114)  BP(mean): 114 (10-16-23 @ 12:00) (83 - 146)  RR: 17 (10-16-23 @ 12:00) (10 - 57)  SpO2: 98% (10-16-23 @ 12:00) (97% - 100%)  Wt(kg): --  Vent:   ABG:       10-15 @ 07:01  -  10-16 @ 07:00  --------------------------------------------------------  IN: 1261.9 mL / OUT: 1450 mL / NET: -188.1 mL          ## P/E:  Gen: pt diaphoretic with increased motor tone and restless  Lungs: CTA  Heart: Tachy  Abd: Soft/(+)BS  Ext: No edema  Neuro: Able to answer questions, increased motor tone and restless    CENTRAL LINE: [ ] YES [ ] NO  LOCATION:   DATE INSERTED:  REMOVE: [ ] YES [ ] NO      FREEMAN: [ ] YES [ ] NO    DATE INSERTED:  REMOVE:  [ ] YES [ ] NO      A-LINE:  [ ] YES [ ] NO  LOCATION:   DATE INSERTED:  REMOVE:  [ ] YES [ ] NO  EXPLAIN:    CODE STATUS: [x ] full code  [ ] DNR  [ ] DNI  [ ] MOLST  Goals of care discussion: [ ] yes    HPI: Pt is a 27 yo BM with h/o bipolar disorder, s/p prior suicide attempt, h/o cutting and inpatient stay ~1 year ago who presented to [facility] 10/11 brought in by his friend for "bizarre behavior." Per his friend, pt has recently been stressed and had been smoking marijuana and became noticeably agitated causing his friends to become concerned he was going to have a psychiatric breakdown. Upon 10/11 night, pt fell outside hospital, hit his head and was unable to answer any questions. Prior to this, pt reportedly would not speak or move. While in ER pt reportedly became extremely agitated, thrashing his body around, hitting his head, swinging his arms / legs with code grey called. He was given ketamine IM, however was requiring 4 pt restraints and ER physician intubated for patient and staff safety. ICU dx: Psychotic episode with extreme agitation requiring sedation protocol and intubation. 10/12 am sedation held, pt calm and weaned well on CPAP 5 + PS 5; pt extubated. Later as pt became more awake he became agitated, shaking and tried to get out of bed. Multiple members of the ICU team required to hold pt down and pt Rx'd with multiple pushes of Propofol 30mg and Haldol 5mg. Pt calmed down initially and then became agitated again + he put both hands in his mouth pulling his mandible down. Pt started on a Precedex drip and calmed down. Over this weekend pt with periods of agitation. This am pt diaphoretic, hyperventilating, restless with increased motor tone of all extremities; under the direction of telepsych pt Rx'd with Ativan 2mg im and Cogentin 2mg im ->this afternoon pt calm and sitting in a chair. R/o NMS vs extrapyramidal symptoms       ## Labs:  CBC:                        12.8   8.38  )-----------( 163      ( 16 Oct 2023 02:10 )             41.2     Chem:  10-16    136  |  104  |  9   ----------------------------<  98  3.8   |  24  |  0.99    Ca    8.3<L>      16 Oct 2023 02:10  Phos  4.4     10-16  Mg     1.7     10-16    TPro  7.0  /  Alb  3.1<L>  /  TBili  1.3<H>  /  DBili  x   /  AST  69<H>  /  ALT  43  /  AlkPhos  58  10-16    Coags:          ## Imaging:    ## Medications:          enoxaparin Injectable 40 milliGRAM(s) SubCutaneous every 24 hours    pantoprazole  Injectable 40 milliGRAM(s) IV Push daily    dexMEDEtomidine Infusion 0.5 MICROgram(s)/kG/Hr IV Continuous <Continuous>  midazolam Injectable 2 milliGRAM(s) IV Push every 4 hours PRN      ## Vitals:  T(C): 37 (10-16-23 @ 07:52), Max: 37 (10-16-23 @ 07:52)  HR: 106 (10-16-23 @ 12:00) (46 - 185)  BP: 141/101 (10-16-23 @ 12:00) (114/70 - 182/114)  BP(mean): 114 (10-16-23 @ 12:00) (83 - 146)  RR: 17 (10-16-23 @ 12:00) (10 - 57)  SpO2: 98% (10-16-23 @ 12:00) (97% - 100%)  Wt(kg): --  Vent:   ABG:       10-15 @ 07:01  -  10-16 @ 07:00  --------------------------------------------------------  IN: 1261.9 mL / OUT: 1450 mL / NET: -188.1 mL          ## P/E:  Gen: pt diaphoretic with increased motor tone and restless  Lungs: CTA  Heart: Tachy  Abd: Soft/(+)BS  Ext: No edema  Neuro: Able to answer questions, increased motor tone and restless    CENTRAL LINE: [ ] YES [ ] NO  LOCATION:   DATE INSERTED:  REMOVE: [ ] YES [ ] NO      FREEMAN: [ ] YES [ ] NO    DATE INSERTED:  REMOVE:  [ ] YES [ ] NO      A-LINE:  [ ] YES [ ] NO  LOCATION:   DATE INSERTED:  REMOVE:  [ ] YES [ ] NO  EXPLAIN:    CODE STATUS: [x ] full code  [ ] DNR  [ ] DNI  [ ] MOLST  Goals of care discussion: [ ] yes

## 2023-10-16 NOTE — PROGRESS NOTE ADULT - ASSESSMENT
28 year old male with a PMH of bipolar disorder, s/p prior suicide attempt, h/o cutting and inpatient stay ~1 year ago brought in by his friend for "bizarre behavior." Patient had shaking episode.   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

## 2023-10-16 NOTE — BH CONSULTATION LIAISON PROGRESS NOTE - NSBHASSESSMENTFT_PSY_ALL_CORE
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), with hx of Marijuana Abuse (reportedly has been smoking weed every day since 2020 including day of admission), including hx of substance induced psychosis involving unpredictable, disorganized behavior including trying to cut his own throat & has a collapsed lung/catatonic features resulting in admission to [facility] 1/19-1/24/2022 with referral to [facility] for addiction services from 2/5/22-6/9/23, Py still currently attends outpatient mental health services with a psychiatrist and psychologist for 2x/week th+ [facility] for addiction services from 2/5/22-6/9/23, who was experiencing life stressors, some mood changes, asking his brother to come stay with him in the last few days, with poor sleep the night before admission, but was able to go to work and do his job well the day of admission during which time everyone there was smoking the same Marijuana with no one else reportedly experiencing any issues/side effects. At one point in time, Pt was looking at his phone and his face changed. His brother at this time asked if all was okay and offered to drive him home. In the car ride sad nothing and remained quiet, affect constricted and was staring ahead. Once brother dropped off his daughter, Patient started to have a "nervous shake" which later evolved to the whole body. Brother decided to drive him back to [facility] as he was there before but the shaking became so strong that he ended up coming to the nearest ED. As per Pt's ex-girlfriend, she spoke to him 10/4 where he seemed "manic," pacing and not making sense when speaking. Night of coming to the hospital patient smoked weed with his friends, "seized up" and started becoming aggressive, which prompted EMS call. Patient was found on ground of ER parking lot with seizure like activity. While in ED, pt reportedly became extremely agitated, thrashing his body around, hitting his head, swinging his arms / legs with Code Grey called. He was given ketamine IM, however was requiring 4 pt restraints and ED physician intubated for patient and staff safety. Admitted to ICU, extubated on 10/12/23. CT head negative, but CT spine noting Cervical intervertebral disc spaces show disc degeneration and spondylosis at C2-3 through C5-6 with mild loss of disc height. Small LEFT paramedian disc herniation noted at C2-3 which indents the ventral cord. Narrowing of the BILATERAL C3-4, C4-5 neural foramina due to uncovertebral spurring and facet osteophytic hypertrophy. Cr elevated at 1.65, ED PA d/w ortho / spine with no immediate intervention required. UTOX + THC, benzo, serum tox negative.     10/16  pt has been gettting prn valium haldol benadryl and versed frequently last three days for bizarre posturing and anxiety  exam today was notable for pt holding bizarre postures, grimacing, resisting movement by team, that I found quite  suspicious for catatonia. he also seemed to have rigid and garcia large muscles.   I think the differential should include catatonia, dystonia, NMS, encephalopathy and status epilepticus, among other conditions  would cont icu support, neuro work up, would avoid haldol or other antipsychotics and can use   ativan 2mg po/im/iv q4 prn signs of catatonia  and cogentin 2mg po/im q8 prn signs of dystonia  telecl will follow up. 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 for addiction services from 2/5/22-6/9/23, Py still currently attends outpatient mental health services with a psychiatrist and psychologist for 2x/week th+ [facility] for addiction services from 2/5/22-6/9/23, who was experiencing life stressors, some mood changes, asking his brother to come stay with him in the last few days, with poor sleep the night before admission, but was able to go to work and do his job well the day of admission during which time everyone there was smoking the same Marijuana with no one else reportedly experiencing any issues/side effects. At one point in time, Pt was looking at his phone and his face changed. His brother at this time asked if all was okay and offered to drive him home. In the car ride sad nothing and remained quiet, affect constricted and was staring ahead. Once brother dropped off his daughter, Patient started to have a "nervous shake" which later evolved to the whole body. Brother decided to drive him back to [facility] as he was there before but the shaking became so strong that he ended up coming to the nearest ED. As per Pt's ex-girlfriend, she spoke to him 10/4 where he seemed "manic," pacing and not making sense when speaking. Night of coming to the hospital patient smoked weed with his friends, "seized up" and started becoming aggressive, which prompted EMS call. Patient was found on ground of ER parking lot with seizure like activity. While in ED, pt reportedly became extremely agitated, thrashing his body around, hitting his head, swinging his arms / legs with Code Grey called. He was given ketamine IM, however was requiring 4 pt restraints and ED physician intubated for patient and staff safety. Admitted to ICU, extubated on 10/12/23. CT head negative, but CT spine noting Cervical intervertebral disc spaces show disc degeneration and spondylosis at C2-3 through C5-6 with mild loss of disc height. Small LEFT paramedian disc herniation noted at C2-3 which indents the ventral cord. Narrowing of the BILATERAL C3-4, C4-5 neural foramina due to uncovertebral spurring and facet osteophytic hypertrophy. Cr elevated at 1.65, ED PA d/w ortho / spine with no immediate intervention required. UTOX + THC, benzo, serum tox negative.     10/16  pt has been gettting prn valium haldol benadryl and versed frequently last three days for bizarre posturing and anxiety  exam today was notable for pt holding bizarre postures, grimacing, resisting movement by team, that I found quite  suspicious for catatonia. he also seemed to have rigid and garcia large muscles.   I think the differential should include catatonia, dystonia, NMS, encephalopathy and status epilepticus, among other conditions  would cont icu support, neuro work up, would avoid haldol or other antipsychotics and can use   ativan 2mg po/im/iv q4 prn signs of catatonia  and cogentin 2mg po/im q8 prn signs of dystonia  telecl will follow up. 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), with hx of Marijuana Abuse (reportedly has been smoking weed every day since 2020 including day of admission), including hx of substance induced psychosis involving unpredictable, disorganized behavior including trying to cut his own throat & has a collapsed lung/catatonic features resulting in admission to [facility] 1/19-1/24/2022 with referral to [facility] for addiction services from 2/5/22-6/9/23, Py still currently attends outpatient mental health services with a psychiatrist and psychologist for 2x/week th+ [facility] for addiction services from 2/5/22-6/9/23, who was experiencing life stressors, some mood changes, asking his brother to come stay with him in the last few days, with poor sleep the night before admission, but was able to go to work and do his job well the day of admission during which time everyone there was smoking the same Marijuana with no one else reportedly experiencing any issues/side effects. At one point in time, Pt was looking at his phone and his face changed. His brother at this time asked if all was okay and offered to drive him home. In the car ride sad nothing and remained quiet, affect constricted and was staring ahead. Once brother dropped off his daughter, Patient started to have a "nervous shake" which later evolved to the whole body. Brother decided to drive him back to [facility] as he was there before but the shaking became so strong that he ended up coming to the nearest ED. As per Pt's ex-girlfriend, she spoke to him 10/4 where he seemed "manic," pacing and not making sense when speaking. Night of coming to the hospital patient smoked weed with his friends, "seized up" and started becoming aggressive, which prompted EMS call. Patient was found on ground of ER parking lot with seizure like activity. While in ED, pt reportedly became extremely agitated, thrashing his body around, hitting his head, swinging his arms / legs with Code Grey called. He was given ketamine IM, however was requiring 4 pt restraints and ED physician intubated for patient and staff safety. Admitted to ICU, extubated on 10/12/23. CT head negative, but CT spine noting Cervical intervertebral disc spaces show disc degeneration and spondylosis at C2-3 through C5-6 with mild loss of disc height. Small LEFT paramedian disc herniation noted at C2-3 which indents the ventral cord. Narrowing of the BILATERAL C3-4, C4-5 neural foramina due to uncovertebral spurring and facet osteophytic hypertrophy. Cr elevated at 1.65, ED PA d/w ortho / spine with no immediate intervention required. UTOX + THC, benzo, serum tox negative.     10/16  pt has been gettting prn valium haldol benadryl and versed frequently last three days for bizarre posturing and anxiety  exam today was notable for pt holding bizarre postures, grimacing, resisting movement by team, that I found quite  suspicious for catatonia. he also seemed to have rigid and garcia large muscles.   I think the differential should include catatonia, dystonia, NMS, encephalopathy and status epilepticus, among other conditions  would cont icu support, neuro work up, would avoid haldol or other antipsychotics and can use   ativan 2mg po/im/iv q4 prn signs of catatonia  and cogentin 2mg po/im q8 prn signs of dystonia  telecl will follow up. 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 1/19-1/24/2022 with referral to [facility] for addiction services from 2/5/22-6/9/23, Py still currently attends outpatient mental health services with a psychiatrist and psychologist for 2x/week th+ [facility] for addiction services from 2/5/22-6/9/23, who was experiencing life stressors, some mood changes, asking his brother to come stay with him in the last few days, with poor sleep the night before admission, but was able to go to work and do his job well the day of admission during which time everyone there was smoking the same Marijuana with no one else reportedly experiencing any issues/side effects. At one point in time, Pt was looking at his phone and his face changed. His brother at this time asked if all was okay and offered to drive him home. In the car ride sad nothing and remained quiet, affect constricted and was staring ahead. Once brother dropped off his daughter, Patient started to have a "nervous shake" which later evolved to the whole body. Brother decided to drive him back to [facility] as he was there before but the shaking became so strong that he ended up coming to the nearest ED. As per Pt's ex-girlfriend, she spoke to him 10/4 where he seemed "manic," pacing and not making sense when speaking. Night of coming to the hospital patient smoked weed with his friends, "seized up" and started becoming aggressive, which prompted EMS call. Patient was found on ground of ER parking lot with seizure like activity. While in ED, pt reportedly became extremely agitated, thrashing his body around, hitting his head, swinging his arms / legs with Code Grey called. He was given ketamine IM, however was requiring 4 pt restraints and ED physician intubated for patient and staff safety. Admitted to ICU, extubated on 10/12/23. CT head negative, but CT spine noting Cervical intervertebral disc spaces show disc degeneration and spondylosis at C2-3 through C5-6 with mild loss of disc height. Small LEFT paramedian disc herniation noted at C2-3 which indents the ventral cord. Narrowing of the BILATERAL C3-4, C4-5 neural foramina due to uncovertebral spurring and facet osteophytic hypertrophy. Cr elevated at 1.65, ED PA d/w ortho / spine with no immediate intervention required. UTOX + THC, benzo, serum tox negative.     10/16  pt has been gettting prn valium haldol benadryl and versed frequently last three days for bizarre posturing and anxiety  exam today was notable for pt holding bizarre postures, grimacing, resisting movement by team, that I found quite  suspicious for catatonia. he also seemed to have rigid and garcia large muscles.   I think the differential should include catatonia, dystonia, malignant hyperthermia or NMS, encephalopathy, PNES or conversion  disorder, and status epilepticus, among other conditions  would cont icu support, neuro work up, would avoid haldol or other antipsychotics and can use   ativan 2mg po/im/iv q4 prn signs of catatonia  and cogentin 2mg po/im q8 prn signs of dystonia  telecl will follow up. 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), with hx of Marijuana Abuse (reportedly has been smoking weed every day since 2020 including day of admission), including hx of substance induced psychosis involving unpredictable, disorganized behavior including trying to cut his own throat & has a collapsed lung/catatonic features resulting in admission to [facility] 1/19-1/24/2022 with referral to [facility] for addiction services from 2/5/22-6/9/23, Py still currently attends outpatient mental health services with a psychiatrist and psychologist for 2x/week th+ [facility] for addiction services from 2/5/22-6/9/23, who was experiencing life stressors, some mood changes, asking his brother to come stay with him in the last few days, with poor sleep the night before admission, but was able to go to work and do his job well the day of admission during which time everyone there was smoking the same Marijuana with no one else reportedly experiencing any issues/side effects. At one point in time, Pt was looking at his phone and his face changed. His brother at this time asked if all was okay and offered to drive him home. In the car ride sad nothing and remained quiet, affect constricted and was staring ahead. Once brother dropped off his daughter, Patient started to have a "nervous shake" which later evolved to the whole body. Brother decided to drive him back to [facility] as he was there before but the shaking became so strong that he ended up coming to the nearest ED. As per Pt's ex-girlfriend, she spoke to him 10/4 where he seemed "manic," pacing and not making sense when speaking. Night of coming to the hospital patient smoked weed with his friends, "seized up" and started becoming aggressive, which prompted EMS call. Patient was found on ground of ER parking lot with seizure like activity. While in ED, pt reportedly became extremely agitated, thrashing his body around, hitting his head, swinging his arms / legs with Code Grey called. He was given ketamine IM, however was requiring 4 pt restraints and ED physician intubated for patient and staff safety. Admitted to ICU, extubated on 10/12/23. CT head negative, but CT spine noting Cervical intervertebral disc spaces show disc degeneration and spondylosis at C2-3 through C5-6 with mild loss of disc height. Small LEFT paramedian disc herniation noted at C2-3 which indents the ventral cord. Narrowing of the BILATERAL C3-4, C4-5 neural foramina due to uncovertebral spurring and facet osteophytic hypertrophy. Cr elevated at 1.65, ED PA d/w ortho / spine with no immediate intervention required. UTOX + THC, benzo, serum tox negative.     10/16  pt has been gettting prn valium haldol benadryl and versed frequently last three days for bizarre posturing and anxiety  exam today was notable for pt holding bizarre postures, grimacing, resisting movement by team, that I found quite  suspicious for catatonia. he also seemed to have rigid and garcia large muscles.   I think the differential should include catatonia, dystonia, malignant hyperthermia or NMS, encephalopathy, PNES or conversion  disorder, and status epilepticus, among other conditions  would cont icu support, neuro work up, would avoid haldol or other antipsychotics and can use   ativan 2mg po/im/iv q4 prn signs of catatonia  and cogentin 2mg po/im q8 prn signs of dystonia  telecl will follow up. 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), with hx of Marijuana Abuse (reportedly has been smoking weed every day since 2020 including day of admission), including hx of substance induced psychosis involving unpredictable, disorganized behavior including trying to cut his own throat & has a collapsed lung/catatonic features resulting in admission to [facility] 1/19-1/24/2022 with referral to [facility] for addiction services from 2/5/22-6/9/23, Py still currently attends outpatient mental health services with a psychiatrist and psychologist for 2x/week th+ [facility] for addiction services from 2/5/22-6/9/23, who was experiencing life stressors, some mood changes, asking his brother to come stay with him in the last few days, with poor sleep the night before admission, but was able to go to work and do his job well the day of admission during which time everyone there was smoking the same Marijuana with no one else reportedly experiencing any issues/side effects. At one point in time, Pt was looking at his phone and his face changed. His brother at this time asked if all was okay and offered to drive him home. In the car ride sad nothing and remained quiet, affect constricted and was staring ahead. Once brother dropped off his daughter, Patient started to have a "nervous shake" which later evolved to the whole body. Brother decided to drive him back to [facility] as he was there before but the shaking became so strong that he ended up coming to the nearest ED. As per Pt's ex-girlfriend, she spoke to him 10/4 where he seemed "manic," pacing and not making sense when speaking. Night of coming to the hospital patient smoked weed with his friends, "seized up" and started becoming aggressive, which prompted EMS call. Patient was found on ground of ER parking lot with seizure like activity. While in ED, pt reportedly became extremely agitated, thrashing his body around, hitting his head, swinging his arms / legs with Code Grey called. He was given ketamine IM, however was requiring 4 pt restraints and ED physician intubated for patient and staff safety. Admitted to ICU, extubated on 10/12/23. CT head negative, but CT spine noting Cervical intervertebral disc spaces show disc degeneration and spondylosis at C2-3 through C5-6 with mild loss of disc height. Small LEFT paramedian disc herniation noted at C2-3 which indents the ventral cord. Narrowing of the BILATERAL C3-4, C4-5 neural foramina due to uncovertebral spurring and facet osteophytic hypertrophy. Cr elevated at 1.65, ED PA d/w ortho / spine with no immediate intervention required. UTOX + THC, benzo, serum tox negative.     10/16  pt has been gettting prn valium haldol benadryl and versed frequently last three days for bizarre posturing and anxiety  exam today was notable for pt holding bizarre postures, grimacing, resisting movement by team, that I found quite  suspicious for catatonia. he also seemed to have rigid and garcia large muscles.   I think the differential should include catatonia, dystonia, malignant hyperthermia or NMS, encephalopathy, PNES or conversion  disorder, and status epilepticus, among other conditions  would cont icu support, neuro work up, would avoid haldol or other antipsychotics and can use   ativan 2mg po/im/iv q4 prn signs of catatonia  and cogentin 2mg po/im q8 prn signs of dystonia  telecl will follow up. pt is not psychiatrically cleared

## 2023-10-16 NOTE — PROGRESS NOTE ADULT - SUBJECTIVE AND OBJECTIVE BOX
Neurology Progress Note    S: Patient seen and examined in ICU.       MEDICATIONS:    chlorhexidine 2% Cloths 1 Application(s) Topical <User Schedule>  dexMEDEtomidine Infusion 0.5 MICROgram(s)/kG/Hr IV Continuous <Continuous>  enoxaparin Injectable 40 milliGRAM(s) SubCutaneous every 24 hours  influenza   Vaccine 0.5 milliLiter(s) IntraMuscular once  midazolam Injectable 2 milliGRAM(s) IV Push every 4 hours PRN  pantoprazole  Injectable 40 milliGRAM(s) IV Push daily      LABS:                          12.8   8.38  )-----------( 163      ( 16 Oct 2023 02:10 )             41.2     10-16    136  |  104  |  9   ----------------------------<  98  3.8   |  24  |  0.99    Ca    8.3<L>      16 Oct 2023 02:10  Phos  4.4     10-16  Mg     1.7     10-16    TPro  7.0  /  Alb  3.1<L>  /  TBili  1.3<H>  /  DBili  x   /  AST  69<H>  /  ALT  43  /  AlkPhos  58  10-16    CAPILLARY BLOOD GLUCOSE          Urinalysis Basic - ( 16 Oct 2023 02:10 )    Color: x / Appearance: x / SG: x / pH: x  Gluc: 98 mg/dL / Ketone: x  / Bili: x / Urobili: x   Blood: x / Protein: x / Nitrite: x   Leuk Esterase: x / RBC: x / WBC x   Sq Epi: x / Non Sq Epi: x / Bacteria: x      I&O's Summary    15 Oct 2023 07:01  -  16 Oct 2023 07:00  --------------------------------------------------------  IN: 1261.9 mL / OUT: 1450 mL / NET: -188.1 mL      Vital Signs Last 24 Hrs  T(C): 37 (16 Oct 2023 07:52), Max: 37 (16 Oct 2023 07:52)  T(F): 98.6 (16 Oct 2023 07:52), Max: 98.6 (16 Oct 2023 07:52)  HR: 106 (16 Oct 2023 12:00) (46 - 185)  BP: 141/101 (16 Oct 2023 12:00) (114/70 - 182/114)  BP(mean): 114 (16 Oct 2023 12:00) (83 - 146)  RR: 17 (16 Oct 2023 12:00) (10 - 57)  SpO2: 98% (16 Oct 2023 12:00) (97% - 100%)    Parameters below as of 15 Oct 2023 20:00  Patient On (Oxygen Delivery Method): room air           Neurological Exam:  Mental Status: Awake, alert and oriented x 3.  Able to follow simple and complex verbal commands. Able to name and repeat. fluent speech. No obvious aphasia or dysarthria noted.   Cranial Nerves: PERRL, EOMI, VFFC, sensation V1-V3 intact,  no obvious facial asymmetry , equal elevation of palate, scm/trap 5/5, tongue is midline on protrusion. no obvious papilledema on fundoscopic exam. Hearing is grossly intact.   Motor: Normal bulk, tone and strength throughout. Fine finger movements were intact and symmetric. no tremors or drift noted.    Sensation: Intact to light touch and pinprick throughout. no right/left confusion. no extinction to tactile on DSS.   Reflexes: 1+ throughout at biceps, brachioradialis, triceps, patellars and ankles bilaterally and equal. No clonus. R toe and L toe were both downgoing.  Coordination: No dysmetria on FNF   Gait: deferred     I personally reviewed the below data/images/labs:          CT HEAD: No acute abnormality.    Abundant secretions in the nasal cavity   andnasopharynx.    CT cervical spine:   No vertebral fracture is recognized.  Disc   degeneration and spondylosis at C2-3 through C5-6 with mild loss of disc   height. Small LEFT paramedian disc herniation noted at C2-3 which indents   the ventral cord.Narrowing of the BILATERAL C3-4, C4-5 neural foramina   due to uncovertebral spurring and facet osteophytic hypertrophy.    --- End of Report ---

## 2023-10-16 NOTE — BH CONSULTATION LIAISON PROGRESS NOTE - NSBHFUPINTERVALHXFT_PSY_A_CORE
pt seen, signout received from Dr. Gomez, case d/w Dr. Torres.   pt is seen with bizarre posturing, grimacing and holding poses and resisting being moved consistent   with possible catatonia  per Dr. Torres he was also rigid in his large muscles as if they were garcia  afebrile. normal bp  he has been getting haldol, valium, versed and benadryl copiously in the last three days, and is on precedex again  I discussed with dr. Torres my concerns for catatonia, dystonia, and NMS  pt was given cogentin 2mg IM x1 as well as ativan 2mg IM x1  pt reports his mood is "bad and unsettling" and he said he "tried to harm " himself, when asked to clarify  he said he banged his head in the parking lot.   he said he smoked MJ and has a hx of psychosis and taking abilify for it in the past in the past.  pt seen, signout received from Dr. Gomez, case d/w Dr. Torres.   pt is seen with bizarre posturing, grimacing and holding poses and resisting being moved,   consistent   with possible catatonia  per Dr. Torres he was also rigid in his large muscles as if they were garcia  afebrile. normal bp  he has been getting haldol, valium, versed and benadryl copiously in the last three days, and is on precedex again  I discussed with dr. Torres my concerns for catatonia, dystonia, and NMS  pt was given cogentin 2mg IM x1 as well as ativan 2mg IM x1  pt reports his mood is "bad and unsettling" and he said he "tried to harm " himself, when asked to clarify  he said he banged his head in the parking lot.   he said he smoked MJ and has a hx of psychosis and taking abilify for it in the past in the past.

## 2023-10-16 NOTE — BH CONSULTATION LIAISON PROGRESS NOTE - NSICDXBHPRIMARYDX_PSY_ALL_CORE
AMS (altered mental status)   R41.33   AMS (altered mental status)   R41.66   AMS (altered mental status)   R41.01

## 2023-10-17 VITALS
SYSTOLIC BLOOD PRESSURE: 137 MMHG | DIASTOLIC BLOOD PRESSURE: 89 MMHG | HEART RATE: 88 BPM | OXYGEN SATURATION: 96 % | RESPIRATION RATE: 24 BRPM

## 2023-10-17 LAB
ALBUMIN SERPL ELPH-MCNC: 3.7 G/DL — SIGNIFICANT CHANGE UP (ref 3.3–5)
ALP SERPL-CCNC: 69 U/L — SIGNIFICANT CHANGE UP (ref 40–120)
ALT FLD-CCNC: 76 U/L — SIGNIFICANT CHANGE UP (ref 12–78)
ANION GAP SERPL CALC-SCNC: 11 MMOL/L — SIGNIFICANT CHANGE UP (ref 5–17)
AST SERPL-CCNC: 135 U/L — HIGH (ref 15–37)
BASOPHILS # BLD AUTO: 0.06 K/UL — SIGNIFICANT CHANGE UP (ref 0–0.2)
BASOPHILS NFR BLD AUTO: 0.8 % — SIGNIFICANT CHANGE UP (ref 0–2)
BILIRUB SERPL-MCNC: 1.3 MG/DL — HIGH (ref 0.2–1.2)
BUN SERPL-MCNC: 9 MG/DL — SIGNIFICANT CHANGE UP (ref 7–23)
CALCIUM SERPL-MCNC: 8.8 MG/DL — SIGNIFICANT CHANGE UP (ref 8.5–10.1)
CHLORIDE SERPL-SCNC: 101 MMOL/L — SIGNIFICANT CHANGE UP (ref 96–108)
CO2 SERPL-SCNC: 24 MMOL/L — SIGNIFICANT CHANGE UP (ref 22–31)
CREAT SERPL-MCNC: 0.99 MG/DL — SIGNIFICANT CHANGE UP (ref 0.5–1.3)
EGFR: 105 ML/MIN/1.73M2 — SIGNIFICANT CHANGE UP
EOSINOPHIL # BLD AUTO: 0.05 K/UL — SIGNIFICANT CHANGE UP (ref 0–0.5)
EOSINOPHIL NFR BLD AUTO: 0.7 % — SIGNIFICANT CHANGE UP (ref 0–6)
GLUCOSE SERPL-MCNC: 89 MG/DL — SIGNIFICANT CHANGE UP (ref 70–99)
HCT VFR BLD CALC: 46 % — SIGNIFICANT CHANGE UP (ref 39–50)
HGB BLD-MCNC: 14.4 G/DL — SIGNIFICANT CHANGE UP (ref 13–17)
IMM GRANULOCYTES NFR BLD AUTO: 0.3 % — SIGNIFICANT CHANGE UP (ref 0–0.9)
LYMPHOCYTES # BLD AUTO: 1.37 K/UL — SIGNIFICANT CHANGE UP (ref 1–3.3)
LYMPHOCYTES # BLD AUTO: 18.5 % — SIGNIFICANT CHANGE UP (ref 13–44)
MAGNESIUM SERPL-MCNC: 2.2 MG/DL — SIGNIFICANT CHANGE UP (ref 1.6–2.6)
MCHC RBC-ENTMCNC: 22.2 PG — LOW (ref 27–34)
MCHC RBC-ENTMCNC: 31.3 G/DL — LOW (ref 32–36)
MCV RBC AUTO: 71 FL — LOW (ref 80–100)
MONOCYTES # BLD AUTO: 0.79 K/UL — SIGNIFICANT CHANGE UP (ref 0–0.9)
MONOCYTES NFR BLD AUTO: 10.6 % — SIGNIFICANT CHANGE UP (ref 2–14)
NEUTROPHILS # BLD AUTO: 5.13 K/UL — SIGNIFICANT CHANGE UP (ref 1.8–7.4)
NEUTROPHILS NFR BLD AUTO: 69.1 % — SIGNIFICANT CHANGE UP (ref 43–77)
NRBC # BLD: 0 /100 WBCS — SIGNIFICANT CHANGE UP (ref 0–0)
PHOSPHATE SERPL-MCNC: 4.1 MG/DL — SIGNIFICANT CHANGE UP (ref 2.5–4.5)
PLATELET # BLD AUTO: 210 K/UL — SIGNIFICANT CHANGE UP (ref 150–400)
POTASSIUM SERPL-MCNC: 3.7 MMOL/L — SIGNIFICANT CHANGE UP (ref 3.5–5.3)
POTASSIUM SERPL-SCNC: 3.7 MMOL/L — SIGNIFICANT CHANGE UP (ref 3.5–5.3)
PROT SERPL-MCNC: 8.2 GM/DL — SIGNIFICANT CHANGE UP (ref 6–8.3)
RBC # BLD: 6.48 M/UL — HIGH (ref 4.2–5.8)
RBC # FLD: 17.6 % — HIGH (ref 10.3–14.5)
SODIUM SERPL-SCNC: 136 MMOL/L — SIGNIFICANT CHANGE UP (ref 135–145)
WBC # BLD: 7.42 K/UL — SIGNIFICANT CHANGE UP (ref 3.8–10.5)
WBC # FLD AUTO: 7.42 K/UL — SIGNIFICANT CHANGE UP (ref 3.8–10.5)

## 2023-10-17 PROCEDURE — 72050 X-RAY EXAM NECK SPINE 4/5VWS: CPT | Mod: 26

## 2023-10-17 PROCEDURE — 99239 HOSP IP/OBS DSCHRG MGMT >30: CPT

## 2023-10-17 PROCEDURE — 70551 MRI BRAIN STEM W/O DYE: CPT | Mod: 26

## 2023-10-17 RX ORDER — AMLODIPINE BESYLATE 2.5 MG/1
1 TABLET ORAL
Qty: 0 | Refills: 0 | DISCHARGE
Start: 2023-10-17

## 2023-10-17 RX ORDER — AMLODIPINE BESYLATE 2.5 MG/1
1 TABLET ORAL
Qty: 30 | Refills: 0
Start: 2023-10-17 | End: 2023-11-15

## 2023-10-17 RX ADMIN — CHLORHEXIDINE GLUCONATE 1 APPLICATION(S): 213 SOLUTION TOPICAL at 12:11

## 2023-10-17 RX ADMIN — Medication 25 MILLIGRAM(S): at 05:36

## 2023-10-17 RX ADMIN — Medication 25 MILLIGRAM(S): at 17:29

## 2023-10-17 RX ADMIN — AMLODIPINE BESYLATE 5 MILLIGRAM(S): 2.5 TABLET ORAL at 05:36

## 2023-10-17 RX ADMIN — ENOXAPARIN SODIUM 40 MILLIGRAM(S): 100 INJECTION SUBCUTANEOUS at 12:09

## 2023-10-17 NOTE — BH CONSULTATION LIAISON PROGRESS NOTE - NSBHFUPINTERVALHXFT_PSY_A_CORE
pt seen, chart reviewed, case d/w Dr. Torres.   pt markedly improved today. aaox4, no evidence of posturing/muscle contractions/movement symptoms,   pleasant, cooperative.  denies si/hi/ah/vh. no evidence of tony, psychosis; cogent and linear historian.   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   he did not sleep well and used marijuana night prior to hospital, suspects it was laced.  currently he has no complaints, would like to go home  he says he follows with a therapist "demian michelle" at [facility] where there is also a psychiatrist he can see.  pt seen, chart reviewed, case d/w Dr. Torres.   pt markedly improved today. aaox4, no evidence of posturing/muscle contractions/movement symptoms,   pleasant, cooperative.  denies si/hi/ah/vh. no evidence of tony, psychosis; cogent and linear historian.   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   he did not sleep well and used marijuana night prior to hospital, suspects it was laced.  currently he has no complaints, would like to go home  he says he follows with a therapist "demian michelle" at [facility] where there is also a psychiatrist he can see.  pt seen, chart reviewed, case d/w Dr. Torres.   pt markedly improved today. aaox4, no evidence of posturing/muscle contractions/movement symptoms,   pleasant, cooperative.  denies si/hi/ah/vh. no evidence of tony, psychosis; cogent and linear historian.   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   he did not sleep well and used marijuana night prior to hospital, suspects it was laced.  currently he has no complaints, would like to go home  he says he follows with a therapist "demian michelle" at [facility] where there is also a psychiatrist he can see.

## 2023-10-17 NOTE — PROGRESS NOTE ADULT - REASON FOR ADMISSION
Severe agitation requiring intubation for patient and staff safety, Psychosis

## 2023-10-17 NOTE — BH CONSULTATION LIAISON PROGRESS NOTE - NSBHASSESSMENTFT_PSY_ALL_CORE
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 for addiction services from 2/5/22-6/9/23, Py still currently attends outpatient mental health services with a psychiatrist and psychologist for 2x/week th+ [facility] for addiction services from 2/5/22-6/9/23, who was experiencing life stressors, some mood changes, asking his brother to come stay with him in the last few days, with poor sleep the night before admission, but was able to go to work and do his job well the day of admission during which time everyone there was smoking the same Marijuana with no one else reportedly experiencing any issues/side effects. At one point in time, Pt was looking at his phone and his face changed. His brother at this time asked if all was okay and offered to drive him home. In the car ride sad nothing and remained quiet, affect constricted and was staring ahead. Once brother dropped off his daughter, Patient started to have a "nervous shake" which later evolved to the whole body. Brother decided to drive him back to [facility] as he was there before but the shaking became so strong that he ended up coming to the nearest ED. As per Pt's ex-girlfriend, she spoke to him 10/4 where he seemed "manic," pacing and not making sense when speaking. Night of coming to the hospital patient smoked weed with his friends, "seized up" and started becoming aggressive, which prompted EMS call. Patient was found on ground of ER parking lot with seizure like activity. While in ED, pt reportedly became extremely agitated, thrashing his body around, hitting his head, swinging his arms / legs with Code Grey called. He was given ketamine IM, however was requiring 4 pt restraints and ED physician intubated for patient and staff safety. Admitted to ICU, extubated on 10/12/23. CT head negative, but CT spine noting Cervical intervertebral disc spaces show disc degeneration and spondylosis at C2-3 through C5-6 with mild loss of disc height. Small LEFT paramedian disc herniation noted at C2-3 which indents the ventral cord. Narrowing of the BILATERAL C3-4, C4-5 neural foramina due to uncovertebral spurring and facet osteophytic hypertrophy. Cr elevated at 1.65, ED PA d/w ortho / spine with no immediate intervention required. UTOX + THC, benzo, serum tox negative.     10/16  pt has been gettting prn valium haldol benadryl and versed frequently last three days for bizarre posturing and anxiety  exam today was notable for pt holding bizarre postures, grimacing, resisting movement by team, that I found quite  suspicious for catatonia. he also seemed to have rigid and garcia large muscles.   I think the differential should include catatonia, dystonia, malignant hyperthermia or NMS, encephalopathy, PNES or conversion  disorder, and status epilepticus, among other conditions  would cont icu support, neuro work up, would avoid haldol or other antipsychotics and can use   ativan 2mg po/im/iv q4 prn signs of catatonia  and cogentin 2mg po/im q8 prn signs of dystonia  telecl will follow up. pt is not psychiatrically cleared     10/17  pt markedly improved. no more evidence of movement symptoms such as posturing or muscle contractions/stiffness.   has not been prn'd last 24hrs. he is calm, cooperative, not agitated, with no evidence of disorganization, tony, psyhcosis  or suicidality. denies si/hi  per Dr. gamble's collateral family had no safety concerns.   pt is psychiatrically cleared.   I suspect pt had altered mental status from substance abuse which was then exacerbated yesterday by dystonia from haldol  he had normal mri and neuro says he can f/u for outpatient eeg  he may f/u with outpatient psychiatry and substance abuse treatment  (unclear where or if he has current provider- chart states [facility] though pt says "[place]", I asked dr padilla to have   to have the medicine SW meet with pt to facilitate f/u with current providers, was then told he no longer attends there, so TeleCL btcm   Rosetta Gaona provided outpatient psychiatric and substance abuse referrals to the patient he may f/u with)  27 yo AAM, single, originally from [place], noncaregiver, domiciled alone but most recently his brother has been staying with him, 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), with hx of Marijuana Abuse (reportedly has been smoking weed every day since 2020 including day of admission), including hx of substance induced psychosis involving unpredictable, disorganized behavior including trying to cut his own throat & has a collapsed lung/catatonic features resulting in admission to [facility] 1/19-1/24/2022 with referral to [facility] for addiction services from 2/5/22-6/9/23, Py still currently attends outpatient mental health services with a psychiatrist and psychologist for 2x/week th+ [facility] for addiction services from 2/5/22-6/9/23, who was experiencing life stressors, some mood changes, asking his brother to come stay with him in the last few days, with poor sleep the night before admission, but was able to go to work and do his job well the day of admission during which time everyone there was smoking the same Marijuana with no one else reportedly experiencing any issues/side effects. At one point in time, Pt was looking at his phone and his face changed. His brother at this time asked if all was okay and offered to drive him home. In the car ride sad nothing and remained quiet, affect constricted and was staring ahead. Once brother dropped off his daughter, Patient started to have a "nervous shake" which later evolved to the whole body. Brother decided to drive him back to [facility] as he was there before but the shaking became so strong that he ended up coming to the nearest ED. As per Pt's ex-girlfriend, she spoke to him 10/4 where he seemed "manic," pacing and not making sense when speaking. Night of coming to the hospital patient smoked weed with his friends, "seized up" and started becoming aggressive, which prompted EMS call. Patient was found on ground of ER parking lot with seizure like activity. While in ED, pt reportedly became extremely agitated, thrashing his body around, hitting his head, swinging his arms / legs with Code Grey called. He was given ketamine IM, however was requiring 4 pt restraints and ED physician intubated for patient and staff safety. Admitted to ICU, extubated on 10/12/23. CT head negative, but CT spine noting Cervical intervertebral disc spaces show disc degeneration and spondylosis at C2-3 through C5-6 with mild loss of disc height. Small LEFT paramedian disc herniation noted at C2-3 which indents the ventral cord. Narrowing of the BILATERAL C3-4, C4-5 neural foramina due to uncovertebral spurring and facet osteophytic hypertrophy. Cr elevated at 1.65, ED PA d/w ortho / spine with no immediate intervention required. UTOX + THC, benzo, serum tox negative.     10/16  pt has been gettting prn valium haldol benadryl and versed frequently last three days for bizarre posturing and anxiety  exam today was notable for pt holding bizarre postures, grimacing, resisting movement by team, that I found quite  suspicious for catatonia. he also seemed to have rigid and garcia large muscles.   I think the differential should include catatonia, dystonia, malignant hyperthermia or NMS, encephalopathy, PNES or conversion  disorder, and status epilepticus, among other conditions  would cont icu support, neuro work up, would avoid haldol or other antipsychotics and can use   ativan 2mg po/im/iv q4 prn signs of catatonia  and cogentin 2mg po/im q8 prn signs of dystonia  telecl will follow up. pt is not psychiatrically cleared     10/17  pt markedly improved. no more evidence of movement symptoms such as posturing or muscle contractions/stiffness.   has not been prn'd last 24hrs. he is calm, cooperative, not agitated, with no evidence of disorganization, tony, psyhcosis  or suicidality. denies si/hi  per Dr. gamble's collateral family had no safety concerns.   pt is psychiatrically cleared.   I suspect pt had altered mental status from substance abuse which was then exacerbated yesterday by dystonia from haldol  he had normal mri and neuro says he can f/u for outpatient eeg  he may f/u with outpatient psychiatry and substance abuse treatment  (unclear where or if he has current provider- chart states [facility] though pt says "[place]", I asked dr padilla to have   to have the medicine SW meet with pt to facilitate f/u with current providers, was then told he no longer attends there, so TeleCL btcm   Rosetta Gaona provided outpatient psychiatric and substance abuse referrals to the patient he may f/u with)  27 yo AAM, single, originally from [place] [facility], noncaregiver, domiciled alone but most recently his brother has been staying with him, 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), with hx of Marijuana Abuse (reportedly has been smoking weed every day since 2020 including day of admission), including hx of substance induced psychosis involving unpredictable, disorganized behavior including trying to cut his own throat & has a collapsed lung/catatonic features resulting in admission to [facility] 1/19-1/24/2022 with referral to [facility] for addiction services from 2/5/22-6/9/23, Py still currently attends outpatient mental health services with a psychiatrist and psychologist for 2x/week th+ [facility] for addiction services from 2/5/22-6/9/23, who was experiencing life stressors, some mood changes, asking his brother to come stay with him in the last few days, with poor sleep the night before admission, but was able to go to work and do his job well the day of admission during which time everyone there was smoking the same Marijuana with no one else reportedly experiencing any issues/side effects. At one point in time, Pt was looking at his phone and his face changed. His brother at this time asked if all was okay and offered to drive him home. In the car ride sad nothing and remained quiet, affect constricted and was staring ahead. Once brother dropped off his daughter, Patient started to have a "nervous shake" which later evolved to the whole body. Brother decided to drive him back to [facility] as he was there before but the shaking became so strong that he ended up coming to the nearest ED. As per Pt's ex-girlfriend, she spoke to him 10/4 where he seemed "manic," pacing and not making sense when speaking. Night of coming to the hospital patient smoked weed with his friends, "seized up" and started becoming aggressive, which prompted EMS call. Patient was found on ground of ER parking lot with seizure like activity. While in ED, pt reportedly became extremely agitated, thrashing his body around, hitting his head, swinging his arms / legs with Code Grey called. He was given ketamine IM, however was requiring 4 pt restraints and ED physician intubated for patient and staff safety. Admitted to ICU, extubated on 10/12/23. CT head negative, but CT spine noting Cervical intervertebral disc spaces show disc degeneration and spondylosis at C2-3 through C5-6 with mild loss of disc height. Small LEFT paramedian disc herniation noted at C2-3 which indents the ventral cord. Narrowing of the BILATERAL C3-4, C4-5 neural foramina due to uncovertebral spurring and facet osteophytic hypertrophy. Cr elevated at 1.65, ED PA d/w ortho / spine with no immediate intervention required. UTOX + THC, benzo, serum tox negative.     10/16  pt has been gettting prn valium haldol benadryl and versed frequently last three days for bizarre posturing and anxiety  exam today was notable for pt holding bizarre postures, grimacing, resisting movement by team, that I found quite  suspicious for catatonia. he also seemed to have rigid and garcia large muscles.   I think the differential should include catatonia, dystonia, malignant hyperthermia or NMS, encephalopathy, PNES or conversion  disorder, and status epilepticus, among other conditions  would cont icu support, neuro work up, would avoid haldol or other antipsychotics and can use   ativan 2mg po/im/iv q4 prn signs of catatonia  and cogentin 2mg po/im q8 prn signs of dystonia  telecl will follow up. pt is not psychiatrically cleared     10/17  pt markedly improved. no more evidence of movement symptoms such as posturing or muscle contractions/stiffness.   has not been prn'd last 24hrs. he is calm, cooperative, not agitated, with no evidence of disorganization, tony, psyhcosis  or suicidality. denies si/hi  per Dr. gamble's collateral family had no safety concerns.   pt is psychiatrically cleared.   I suspect pt had altered mental status from substance abuse which was then exacerbated yesterday by dystonia from haldol  he had normal mri and neuro says he can f/u for outpatient eeg  he may f/u with outpatient psychiatry and substance abuse treatment  (unclear where or if he has current provider- chart states [facility] though pt says "[place]", I asked dr padilla to have   to have the medicine SW meet with pt to facilitate f/u with current providers, was then told he no longer attends there, so TeleCL btcm   Rosetta Gaona provided outpatient psychiatric and substance abuse referrals to the patient he may f/u with)

## 2023-10-17 NOTE — PROGRESS NOTE ADULT - ASSESSMENT
28 year old male with a PMH of bipolar disorder, s/p prior suicide attempt, h/o cutting and inpatient stay ~1 year ago brought in by his friend for "bizarre behavior." Patient had shaking episode.   PE normal  CTH no acute findings  CT C-spine degenrative changes  no obvious tongue bite  no incontienence     Impression: episode of shaking, Patient states he was aware when he had bilateral limb shaking possible PNES    - EEG when able, does not need to delay DC  - MRi brain low yield can be done here or as an outpt  - psych recs appreciated

## 2023-10-17 NOTE — PROGRESS NOTE ADULT - SUBJECTIVE AND OBJECTIVE BOX
HPI:  Pt is a 27 yo BM with h/o bipolar disorder, s/p prior suicide attempt, h/o cutting and inpatient stay ~1 year ago who presented to [facility] 10/11 brought in by his friend for "bizarre behavior." Per his friend, pt has recently been stressed and had been smoking marijuana and became noticeably agitated causing his friends to become concerned he was going to have a psychiatric breakdown. Upon 10/11 night, pt fell outside hospital, hit his head and was unable to answer any questions. Prior to this, pt reportedly would not speak or move. While in ER pt reportedly became extremely agitated, thrashing his body around, hitting his head, swinging his arms / legs with code grey called. He was given ketamine IM, however was requiring 4 pt restraints and ER physician intubated for patient and staff safety. ICU dx: Psychotic episode with extreme agitation requiring sedation protocol and intubation. 10/12 am sedation held, pt calm and weaned well on CPAP 5 + PS 5; pt extubated. Later as pt became more awake he became agitated, shaking and tried to get out of bed. Multiple members of the ICU team required to hold pt down and pt Rx'd with multiple pushes of Propofol 30mg and Haldol 5mg. Pt calmed down initially and then became agitated again + he put both hands in his mouth pulling his mandible down. Pt started on a Precedex drip and calmed down. Over this weekend pt with periods of agitation. 10/16 am pt diaphoretic, hyperventilating, restless with increased motor tone of all extremities; under the direction of telepsych pt Rx'd with Ativan 2mg im and Cogentin 2mg im. 10/16 afternoon pt calm and sitting in a chair. R/o NMS vs extrapyramidal symptoms. Since yesterday when pt was give Ativan + Cogentin pt's MS has been appropriate, calm and respectful.      ## Labs:  CBC:                        14.4   7.42  )-----------( 210      ( 17 Oct 2023 04:55 )             46.0     Chem:  10-17    136  |  101  |  9   ----------------------------<  89  3.7   |  24  |  0.99    Ca    8.8      17 Oct 2023 04:55  Phos  4.1     10-17  Mg     2.2     10-17    TPro  8.2  /  Alb  3.7  /  TBili  1.3<H>  /  DBili  x   /  AST  135<H>  /  ALT  76  /  AlkPhos  69  10-17    Coags:          ## Imaging:    ## Medications:    amLODIPine   Tablet 5 milliGRAM(s) Oral daily  metoprolol tartrate 25 milliGRAM(s) Oral two times a day        enoxaparin Injectable 40 milliGRAM(s) SubCutaneous every 24 hours      LORazepam   Injectable 2 milliGRAM(s) IntraMuscular every 2 hours PRN      ## Vitals:  T(C): 37 (10-17-23 @ 15:00), Max: 37.2 (10-17-23 @ 07:28)  HR: 83 (10-17-23 @ 12:00) (70 - 145)  BP: 151/93 (10-17-23 @ 12:00) (136/95 - 167/121)  BP(mean): 111 (10-17-23 @ 12:00) (100 - 137)  RR: 20 (10-17-23 @ 12:00) (14 - 34)  SpO2: 96% (10-17-23 @ 12:00) (96% - 100%)  Wt(kg): --  Vent:   ABG:       10-16 @ 07:01  -  10-17 @ 07:00  --------------------------------------------------------  IN: 864.2 mL / OUT: 1850 mL / NET: -985.8 mL          ## P/E:  Gen: sitting comfortably in chair in no apparent distress  Lungs: CTA  Heart: RRR  Abd: Soft/(+)BS  Ext: No edema  Neuro: AAO x3, calm, polite  CENTRAL LINE: [ ] YES [ ] NO  LOCATION:   DATE INSERTED:  REMOVE: [ ] YES [ ] NO      FREEMAN: [ ] YES [ ] NO    DATE INSERTED:  REMOVE:  [ ] YES [ ] NO      A-LINE:  [ ] YES [ ] NO  LOCATION:   DATE INSERTED:  REMOVE:  [ ] YES [ ] NO  EXPLAIN:    CODE STATUS: [x ] full code  [ ] DNR  [ ] DNI  [ ] MOLST  Goals of care discussion: [ ] yes    HPI:  Pt is a 29 yo BM with h/o bipolar disorder, s/p prior suicide attempt, h/o cutting and inpatient stay ~1 year ago who presented to [facility] 10/11 brought in by his friend for "bizarre behavior." Per his friend, pt has recently been stressed and had been smoking marijuana and became noticeably agitated causing his friends to become concerned he was going to have a psychiatric breakdown. Upon 10/11 night, pt fell outside hospital, hit his head and was unable to answer any questions. Prior to this, pt reportedly would not speak or move. While in ER pt reportedly became extremely agitated, thrashing his body around, hitting his head, swinging his arms / legs with code grey called. He was given ketamine IM, however was requiring 4 pt restraints and ER physician intubated for patient and staff safety. ICU dx: Psychotic episode with extreme agitation requiring sedation protocol and intubation. 10/12 am sedation held, pt calm and weaned well on CPAP 5 + PS 5; pt extubated. Later as pt became more awake he became agitated, shaking and tried to get out of bed. Multiple members of the ICU team required to hold pt down and pt Rx'd with multiple pushes of Propofol 30mg and Haldol 5mg. Pt calmed down initially and then became agitated again + he put both hands in his mouth pulling his mandible down. Pt started on a Precedex drip and calmed down. Over this weekend pt with periods of agitation. 10/16 am pt diaphoretic, hyperventilating, restless with increased motor tone of all extremities; under the direction of telepsych pt Rx'd with Ativan 2mg im and Cogentin 2mg im. 10/16 afternoon pt calm and sitting in a chair. R/o NMS vs extrapyramidal symptoms. Since yesterday when pt was give Ativan + Cogentin pt's MS has been appropriate, calm and respectful.      ## Labs:  CBC:                        14.4   7.42  )-----------( 210      ( 17 Oct 2023 04:55 )             46.0     Chem:  10-17    136  |  101  |  9   ----------------------------<  89  3.7   |  24  |  0.99    Ca    8.8      17 Oct 2023 04:55  Phos  4.1     10-17  Mg     2.2     10-17    TPro  8.2  /  Alb  3.7  /  TBili  1.3<H>  /  DBili  x   /  AST  135<H>  /  ALT  76  /  AlkPhos  69  10-17    Coags:          ## Imaging:    ## Medications:    amLODIPine   Tablet 5 milliGRAM(s) Oral daily  metoprolol tartrate 25 milliGRAM(s) Oral two times a day        enoxaparin Injectable 40 milliGRAM(s) SubCutaneous every 24 hours      LORazepam   Injectable 2 milliGRAM(s) IntraMuscular every 2 hours PRN      ## Vitals:  T(C): 37 (10-17-23 @ 15:00), Max: 37.2 (10-17-23 @ 07:28)  HR: 83 (10-17-23 @ 12:00) (70 - 145)  BP: 151/93 (10-17-23 @ 12:00) (136/95 - 167/121)  BP(mean): 111 (10-17-23 @ 12:00) (100 - 137)  RR: 20 (10-17-23 @ 12:00) (14 - 34)  SpO2: 96% (10-17-23 @ 12:00) (96% - 100%)  Wt(kg): --  Vent:   ABG:       10-16 @ 07:01  -  10-17 @ 07:00  --------------------------------------------------------  IN: 864.2 mL / OUT: 1850 mL / NET: -985.8 mL          ## P/E:  Gen: sitting comfortably in chair in no apparent distress  Lungs: CTA  Heart: RRR  Abd: Soft/(+)BS  Ext: No edema  Neuro: AAO x3, calm, polite  CENTRAL LINE: [ ] YES [ ] NO  LOCATION:   DATE INSERTED:  REMOVE: [ ] YES [ ] NO      FREEMAN: [ ] YES [ ] NO    DATE INSERTED:  REMOVE:  [ ] YES [ ] NO      A-LINE:  [ ] YES [ ] NO  LOCATION:   DATE INSERTED:  REMOVE:  [ ] YES [ ] NO  EXPLAIN:    CODE STATUS: [x ] full code  [ ] DNR  [ ] DNI  [ ] MOLST  Goals of care discussion: [ ] yes    HPI:  Pt is a 27 yo BM with h/o bipolar disorder, s/p prior suicide attempt, h/o cutting and inpatient stay ~1 year ago who presented to [facility] 10/11 brought in by his friend for "bizarre behavior." Per his friend, pt has recently been stressed and had been smoking marijuana and became noticeably agitated causing his friends to become concerned he was going to have a psychiatric breakdown. Upon 10/11 night, pt fell outside hospital, hit his head and was unable to answer any questions. Prior to this, pt reportedly would not speak or move. While in ER pt reportedly became extremely agitated, thrashing his body around, hitting his head, swinging his arms / legs with code grey called. He was given ketamine IM, however was requiring 4 pt restraints and ER physician intubated for patient and staff safety. ICU dx: Psychotic episode with extreme agitation requiring sedation protocol and intubation. 10/12 am sedation held, pt calm and weaned well on CPAP 5 + PS 5; pt extubated. Later as pt became more awake he became agitated, shaking and tried to get out of bed. Multiple members of the ICU team required to hold pt down and pt Rx'd with multiple pushes of Propofol 30mg and Haldol 5mg. Pt calmed down initially and then became agitated again + he put both hands in his mouth pulling his mandible down. Pt started on a Precedex drip and calmed down. Over this weekend pt with periods of agitation. 10/16 am pt diaphoretic, hyperventilating, restless with increased motor tone of all extremities; under the direction of telepsych pt Rx'd with Ativan 2mg im and Cogentin 2mg im. 10/16 afternoon pt calm and sitting in a chair. R/o NMS vs extrapyramidal symptoms. Since yesterday when pt was give Ativan + Cogentin pt's MS has been appropriate, calm and respectful.      ## Labs:  CBC:                        14.4   7.42  )-----------( 210      ( 17 Oct 2023 04:55 )             46.0     Chem:  10-17    136  |  101  |  9   ----------------------------<  89  3.7   |  24  |  0.99    Ca    8.8      17 Oct 2023 04:55  Phos  4.1     10-17  Mg     2.2     10-17    TPro  8.2  /  Alb  3.7  /  TBili  1.3<H>  /  DBili  x   /  AST  135<H>  /  ALT  76  /  AlkPhos  69  10-17    Coags:          ## Imaging:    ## Medications:    amLODIPine   Tablet 5 milliGRAM(s) Oral daily  metoprolol tartrate 25 milliGRAM(s) Oral two times a day        enoxaparin Injectable 40 milliGRAM(s) SubCutaneous every 24 hours      LORazepam   Injectable 2 milliGRAM(s) IntraMuscular every 2 hours PRN      ## Vitals:  T(C): 37 (10-17-23 @ 15:00), Max: 37.2 (10-17-23 @ 07:28)  HR: 83 (10-17-23 @ 12:00) (70 - 145)  BP: 151/93 (10-17-23 @ 12:00) (136/95 - 167/121)  BP(mean): 111 (10-17-23 @ 12:00) (100 - 137)  RR: 20 (10-17-23 @ 12:00) (14 - 34)  SpO2: 96% (10-17-23 @ 12:00) (96% - 100%)  Wt(kg): --  Vent:   ABG:       10-16 @ 07:01  -  10-17 @ 07:00  --------------------------------------------------------  IN: 864.2 mL / OUT: 1850 mL / NET: -985.8 mL          ## P/E:  Gen: sitting comfortably in chair in no apparent distress  Lungs: CTA  Heart: RRR  Abd: Soft/(+)BS  Ext: No edema  Neuro: AAO x3, calm, polite  CENTRAL LINE: [ ] YES [ ] NO  LOCATION:   DATE INSERTED:  REMOVE: [ ] YES [ ] NO      FREEMAN: [ ] YES [ ] NO    DATE INSERTED:  REMOVE:  [ ] YES [ ] NO      A-LINE:  [ ] YES [ ] NO  LOCATION:   DATE INSERTED:  REMOVE:  [ ] YES [ ] NO  EXPLAIN:    CODE STATUS: [x ] full code  [ ] DNR  [ ] DNI  [ ] MOLST  Goals of care discussion: [ ] yes

## 2023-10-17 NOTE — DISCHARGE NOTE PROVIDER - PROVIDER TOKENS
FREE:[LAST:[[place]],FIRST:[Treatment Center],PHONE:[phone]],FAX:[(   )    -],ADDRESS:[Need outpatient f/u ASAP please see within the next 3 days],FOLLOWUP:[1-3 days]],FREE:[LAST:[[facility]],FIRST:[PCP],PHONE:[(   )    -],FAX:[(   )    -],ADDRESS:[please see your outpatient PCP],FOLLOWUP:[2 weeks]] FREE:[LAST:[[place]],FIRST:[Treatment Center],PHONE:[phone]],FAX:[(   )    -],ADDRESS:[Need outpatient f/u ASAP please see within the next 3 days],FOLLOWUP:[1-3 days]],FREE:[LAST:[[facility]],FIRST:[PCP],PHONE:[(   )    -],FAX:[(   )    -],ADDRESS:[please see your outpatient PCP],FOLLOWUP:[2 weeks]] FREE:[LAST:[[place]],FIRST:[Treatment Center],PHONE:[phone]],FAX:[(   )    -],ADDRESS:[Need outpatient f/u ASAP please see within the next 3 days],FOLLOWUP:[1-3 days]],FREE:[LAST:[[facility]],FIRST:[PCP],PHONE:[(   )    -],FAX:[(   )    -],ADDRESS:[please see your outpatient PCP],FOLLOWUP:[2 weeks]]

## 2023-10-17 NOTE — DISCHARGE NOTE PROVIDER - CARE PROVIDER_API CALL
[facility]  Need outpatient f/u ASAP please see within the next 3 days  Phone: [phone]  Fax: (   )    -  Follow Up Time: 1-3 days    Primary Care, PCP  please see your outpatient PCP  Phone: (   )    -  Fax: (   )    -  Follow Up Time: 2 weeks   [facility]  Need outpatient f/u ASAP please see within the next 3 days  Phone: [phone]  Fax: (   )    -  Follow Up Time: 1-3 days    Primary Care, PCP  please see your outpatient PCP  Phone: (   )    -  Fax: (   )    -  Follow Up Time: 2 weeks   [facility]  Need outpatient f/u ASAP please see within the next 3 days  Phone: [phone]  Fax: (   )    -  Follow Up Time: 1-3 days    Primary Care, PCP  please see your outpatient PCP  Phone: (   )    -  Fax: (   )    -  Follow Up Time: 2 weeks

## 2023-10-17 NOTE — DISCHARGE NOTE PROVIDER - REASON FOR ADMISSION
Severe agitation//Psychosis Severe agitation requiring intubation for patient and staff safety, Psychosis

## 2023-10-17 NOTE — DISCHARGE NOTE PROVIDER - HOSPITAL COURSE
28 yr M with h/o bipolar disorder, ptsd s/p prior suicide attempt, h/o cutting and inpatient stay ~1 year ago who presented to [facility] 10/11 brought in by his friend for "bizarre behavior." Per his friend, pt has recently been stressed and had been smoking marijuana and became noticeably agitated causing his friends to become concerned he was going to have a psychiatric breakdown. Upon 10/11 night, pt fell outside hospital, hit his head and was unable to answer any questions. Prior to this, pt reportedly would not speak or move. While in ER pt reportedly became extremely agitated, thrashing his body around, hitting his head, swinging his arms / legs with code grey called. He was given ketamine IM, however was requiring 4 pt restraints and ER physician intubated for patient and staff safety. ICU dx: Psychotic episode with extreme agitation requiring sedation protocol and intubation. Now resolved and extubated. Post had episodes of agitation and pone episode of stiffness where Telepsycjaitry was concerned with NMS vs extrapyramidal symptoms. Pt sent and tolerated MRI head: no pathology. Back at baseline MS & has been appropriate, calm, respectful, w/ no suicide ideations. Discussed with Telepsych and dc'd to home but must f/u with his outpt Psych. Pt agrees to f/u with his Psych. In the future would avoid Haldol if possible.  Attending MD based with Telepsychiatry clear patient for discharge.    28 yr M with h/o bipolar disorder, ptsd s/p prior suicide attempt, h/o cutting and inpatient stay ~1 year ago who presented to [facility] 10/11 brought in by his friend for "bizarre behavior." Per his friend, pt has recently been stressed and had been smoking marijuana and became noticeably agitated causing his friends to become concerned he was going to have a psychiatric breakdown. Upon 10/11 night, pt fell outside hospital, hit his head and was unable to answer any questions. Prior to this, pt reportedly would not speak or move. While in ER pt reportedly became extremely agitated, thrashing his body around, hitting his head, swinging his arms / legs with code grey called. He was given ketamine IM, however was requiring 4 pt restraints and ER physician intubated for patient and staff safety. ICU dx: Psychotic episode with extreme agitation requiring sedation protocol and intubation. Now resolved and extubated. Post had episodes of agitation and pone episode of stiffness where Telepsycjaitry was concerned with NMS vs extrapyramidal symptoms. Pt sent and tolerated MRI head: no pathology. Back at baseline MS & has been appropriate, calm, respectful, w/ no suicide ideations. Discussed with Telepsych and dc'd to home but must f/u with his outpt Psych. Pt agrees to f/u with his Psych. In the future would avoid Haldol if possible.  Attending MD based with Telepsychiatry clear patient for discharge.    28 yr M with h/o bipolar disorder, ptsd s/p prior suicide attempt, h/o cutting and inpatient stay ~1 year ago who presented to [facility] 10/11 brought in by his friend for "bizarre behavior." Per his friend, pt has recently been stressed and had been smoking marijuana and became noticeably agitated causing his friends to become concerned he was going to have a psychiatric breakdown. Upon 10/11 night, pt fell outside hospital, hit his head and was unable to answer any questions. Prior to this, pt reportedly would not speak or move. While in ER pt reportedly became extremely agitated, thrashing his body around, hitting his head, swinging his arms / legs with code grey called. He was given ketamine IM, however was requiring 4 pt restraints and ER physician intubated for patient and staff safety. ICU dx: Psychotic episode with extreme agitation requiring sedation protocol and intubation. Now resolved and extubated. Post had episodes of agitation and pone episode of stiffness where Telepsycjaitry was concerned with NMS vs extrapyramidal symptoms. Pt sent and tolerated MRI head: no pathology. Back at baseline MS & has been appropriate, calm, respectful, w/ no suicide ideations. Discussed with Telepsych and dc'd to home but must f/u with his outpt Psych. Pt agrees to f/u with his Psych. In the future would avoid Haldol if possible.  Attending MD based with Telepsychiatry clear patient for discharge.

## 2023-10-17 NOTE — BH CONSULTATION LIAISON PROGRESS NOTE - NSBHCHARTREVIEWVS_PSY_A_CORE FT
Vital Signs Last 24 Hrs  T(C): 37 (16 Oct 2023 15:21), Max: 37 (16 Oct 2023 07:52)  T(F): 98.6 (16 Oct 2023 15:21), Max: 98.6 (16 Oct 2023 07:52)  HR: 110 (16 Oct 2023 14:00) (46 - 185)  BP: 166/95 (16 Oct 2023 14:00) (114/70 - 182/114)  BP(mean): 115 (16 Oct 2023 14:00) (83 - 146)  RR: 19 (16 Oct 2023 14:00) (10 - 54)  SpO2: 100% (16 Oct 2023 14:00) (97% - 100%)    Parameters below as of 15 Oct 2023 20:00  Patient On (Oxygen Delivery Method): room air    
Vital Signs Last 24 Hrs  T(C): 36.1 (13 Oct 2023 12:00), Max: 37.8 (13 Oct 2023 00:00)  T(F): 97 (13 Oct 2023 12:00), Max: 100 (13 Oct 2023 00:00)  HR: 80 (13 Oct 2023 12:00) (44 - 80)  BP: 124/69 (13 Oct 2023 12:00) (120/74 - 156/104)  BP(mean): 85 (13 Oct 2023 12:00) (85 - 126)  RR: 21 (13 Oct 2023 12:00) (7 - 26)  SpO2: 100% (13 Oct 2023 12:00) (96% - 100%)    
Vital Signs Last 24 Hrs  T(C): 37 (17 Oct 2023 15:00), Max: 37.2 (17 Oct 2023 07:28)  T(F): 98.6 (17 Oct 2023 15:00), Max: 99 (17 Oct 2023 07:28)  HR: 88 (17 Oct 2023 17:00) (70 - 144)  BP: 137/89 (17 Oct 2023 17:00) (136/95 - 163/104)  BP(mean): 101 (17 Oct 2023 17:00) (100 - 137)  RR: 24 (17 Oct 2023 17:00) (14 - 34)  SpO2: 96% (17 Oct 2023 17:00) (96% - 100%)

## 2023-10-17 NOTE — BH CONSULTATION LIAISON PROGRESS NOTE - NSBHATTESTBILLING_PSY_A_CORE
[id number] OBS or IP - moderate complexity OR 35-49 mins [id number]-[id number] OBS or IP - moderate complexity OR 35-49 mins [id number]-[id number] OBS or IP - moderate complexity OR 35-49 mins

## 2023-10-17 NOTE — DISCHARGE NOTE NURSING/CASE MANAGEMENT/SOCIAL WORK - CAREGIVER ADDRESS
[address] [place], [place] [id number] [address] [place], [place] [id number] [address] [place], [place] [id number]

## 2023-10-17 NOTE — PROGRESS NOTE ADULT - SUBJECTIVE AND OBJECTIVE BOX
MEDICATIONS:    amLODIPine   Tablet 5 milliGRAM(s) Oral daily  chlorhexidine 2% Cloths 1 Application(s) Topical <User Schedule>  enoxaparin Injectable 40 milliGRAM(s) SubCutaneous every 24 hours  influenza   Vaccine 0.5 milliLiter(s) IntraMuscular once  LORazepam   Injectable 2 milliGRAM(s) IntraMuscular every 2 hours PRN  metoprolol tartrate 25 milliGRAM(s) Oral two times a day  pantoprazole  Injectable 40 milliGRAM(s) IV Push daily      LABS:                          14.4   7.42  )-----------( 210      ( 17 Oct 2023 04:55 )             46.0     10-17    136  |  101  |  9   ----------------------------<  89  3.7   |  24  |  0.99    Ca    8.8      17 Oct 2023 04:55  Phos  4.1     10-17  Mg     2.2     10-17    TPro  8.2  /  Alb  3.7  /  TBili  1.3<H>  /  DBili  x   /  AST  135<H>  /  ALT  76  /  AlkPhos  69  10-17    CAPILLARY BLOOD GLUCOSE          Urinalysis Basic - ( 17 Oct 2023 04:55 )    Color: x / Appearance: x / SG: x / pH: x  Gluc: 89 mg/dL / Ketone: x  / Bili: x / Urobili: x   Blood: x / Protein: x / Nitrite: x   Leuk Esterase: x / RBC: x / WBC x   Sq Epi: x / Non Sq Epi: x / Bacteria: x      I&O's Summary    16 Oct 2023 07:01  -  17 Oct 2023 07:00  --------------------------------------------------------  IN: 864.2 mL / OUT: 1850 mL / NET: -985.8 mL      Vital Signs Last 24 Hrs  T(C): 37.2 (17 Oct 2023 07:28), Max: 37.2 (17 Oct 2023 07:28)  T(F): 99 (17 Oct 2023 07:28), Max: 99 (17 Oct 2023 07:28)  HR: 70 (17 Oct 2023 08:20) (70 - 145)  BP: 138/89 (17 Oct 2023 08:20) (114/70 - 191/107)  BP(mean): 102 (17 Oct 2023 08:20) (83 - 137)  RR: 16 (17 Oct 2023 08:20) (14 - 34)  SpO2: 97% (17 Oct 2023 08:20) (97% - 100%)      Neurology Progress Note    S: Patient seen and examined in ICU.            Neurological Exam:  Mental Status: Awake, alert and oriented x 3.  Able to follow simple and complex verbal commands. Able to name and repeat. fluent speech. No obvious aphasia or dysarthria noted.   Cranial Nerves: PERRL, EOMI, VFFC, sensation V1-V3 intact,  no obvious facial asymmetry , equal elevation of palate, scm/trap 5/5, tongue is midline on protrusion. no obvious papilledema on fundoscopic exam. Hearing is grossly intact.   Motor: Normal bulk, tone and strength throughout. Fine finger movements were intact and symmetric. no tremors or drift noted.    Sensation: Intact to light touch and pinprick throughout. no right/left confusion. no extinction to tactile on DSS.   Reflexes: 1+ throughout at biceps, brachioradialis, triceps, patellars and ankles bilaterally and equal. No clonus. R toe and L toe were both downgoing.  Coordination: No dysmetria on FNF   Gait: deferred     I personally reviewed the below data/images/labs:          CT HEAD: No acute abnormality.    Abundant secretions in the nasal cavity   andnasopharynx.    CT cervical spine:   No vertebral fracture is recognized.  Disc   degeneration and spondylosis at C2-3 through C5-6 with mild loss of disc   height. Small LEFT paramedian disc herniation noted at C2-3 which indents   the ventral cord.Narrowing of the BILATERAL C3-4, C4-5 neural foramina   due to uncovertebral spurring and facet osteophytic hypertrophy.    --- End of Report ---

## 2023-10-17 NOTE — DISCHARGE NOTE NURSING/CASE MANAGEMENT/SOCIAL WORK - PATIENT PORTAL LINK FT
You can access the FollowMyHealth Patient Portal offered by [facility] by registering at the following website: [email or web address]. By joining [employer]’s FollowMyHealth portal, you will also be able to view your health information using other applications (apps) compatible with our system. You can access the FollowMyHealth Patient Portal offered by [facility] by registering at the following website: [email or web address]. By joining [employer]’s FollowMyHealth portal, you will also be able to view your health information using other applications (apps) compatible with our system. You can access the FollowMyHealth Patient Portal offered by [facility] by registering at the following website: [email or web address]. By joining [employer]’s FollowMyHealth portal, you will also be able to view your health information using other applications (apps) compatible with our system.

## 2023-10-17 NOTE — DISCHARGE NOTE NURSING/CASE MANAGEMENT/SOCIAL WORK - NSDCVIVACCINE_GEN_ALL_CORE_FT
Tdap; 11-Oct-2023 16:18; Noemy Cruz (RN); [employer]; [id number] (Exp. Date: 20-Jul-2025); IntraMuscular; Deltoid Right.; 0.5 milliLiter(s); VIS (VIS Published: 09-May-2013, VIS Presented: 11-Oct-2023);    Tdap; 11-Oct-2023 16:18; Noemy Cruz (RN); [employer]; [id number] (Exp. Date: 20-Jul-2025); IntraMuscular; Deltoid Right.; 0.5 milliLiter(s); VIS (VIS Published: 09-May-2013, VIS Presented: 11-Oct-2023);    Tdap; 11-Oct-2023 16:18; Noemy Cruz (RN); [employer]; [id number] (Exp. Date: 20-Jul-2025); IntraMuscular; Deltoid Right.; 0.5 milliLiter(s); VIS (VIS Published: 09-May-2013, VIS Presented: 11-Oct-2023);

## 2023-10-17 NOTE — DISCHARGE NOTE NURSING/CASE MANAGEMENT/SOCIAL WORK - NSDCPEFALRISK_GEN_ALL_CORE
For information on Fall & Injury Prevention, visit: [email or web address]-[email or web address]-mobility OR  [email or web address]-[email or web address]-injury OR  https://www.cdc.gov/steadi/patient.html For information on Fall & Injury Prevention, visit: [email or web address]://www.[email or web address]-[email or web address]-health-[email or web address]-mobility OR  [email or web address]://[email or web address]-prevention-tips-[email or web address]-injury OR  https://www.cdc.gov/steadi/patient.html For information on Fall & Injury Prevention, visit: [email or web address]://[email or web address].[email or web address]/news/fall-[email or web address]-protects-[email or web address]-maintains-health-[email or web address]-mobility OR  https://[email or web address].[email or web address]/[email or web address]-prevention-tips-to-avoid-injury OR  https://www.cdc.gov/steadi/patient.html

## 2023-10-17 NOTE — BH CONSULTATION LIAISON PROGRESS NOTE - NSICDXBHPRIMARYDX_PSY_ALL_CORE
AMS (altered mental status)   R41.44   AMS (altered mental status)   R41.20   AMS (altered mental status)   R41.72

## 2023-10-17 NOTE — BH CONSULTATION LIAISON PROGRESS NOTE - CURRENT MEDICATION
MEDICATIONS  (STANDING):  chlorhexidine 2% Cloths 1 Application(s) Topical <User Schedule>  dexMEDEtomidine Infusion 1.5 MICROgram(s)/kG/Hr (30.6 mL/Hr) IV Continuous <Continuous>  enoxaparin Injectable 40 milliGRAM(s) SubCutaneous every 24 hours  influenza   Vaccine 0.5 milliLiter(s) IntraMuscular once  lactated ringers. 1000 milliLiter(s) (100 mL/Hr) IV Continuous <Continuous>  pantoprazole  Injectable 40 milliGRAM(s) IV Push daily    MEDICATIONS  (PRN):  haloperidol    Injectable 5 milliGRAM(s) IntraMuscular every 6 hours PRN agitation  midazolam Injectable 2 milliGRAM(s) IV Push every 4 hours PRN agitation  
MEDICATIONS  (STANDING):  amLODIPine   Tablet 5 milliGRAM(s) Oral daily  chlorhexidine 2% Cloths 1 Application(s) Topical <User Schedule>  dexMEDEtomidine Infusion 0.5 MICROgram(s)/kG/Hr (10.2 mL/Hr) IV Continuous <Continuous>  enoxaparin Injectable 40 milliGRAM(s) SubCutaneous every 24 hours  influenza   Vaccine 0.5 milliLiter(s) IntraMuscular once  pantoprazole  Injectable 40 milliGRAM(s) IV Push daily    MEDICATIONS  (PRN):  midazolam Injectable 2 milliGRAM(s) IV Push every 4 hours PRN agitation  
MEDICATIONS  (STANDING):  amLODIPine   Tablet 5 milliGRAM(s) Oral daily  chlorhexidine 2% Cloths 1 Application(s) Topical <User Schedule>  enoxaparin Injectable 40 milliGRAM(s) SubCutaneous every 24 hours  influenza   Vaccine 0.5 milliLiter(s) IntraMuscular once  metoprolol tartrate 25 milliGRAM(s) Oral two times a day    MEDICATIONS  (PRN):  LORazepam   Injectable 2 milliGRAM(s) IntraMuscular every 2 hours PRN Agitation

## 2023-10-17 NOTE — CHART NOTE - NSCHARTNOTEFT_GEN_A_CORE
NOTE:  =============  Events since last  encounter  =============  SOURCE:  RN and Secondhand chart documentation.  BEHAVIOR: RN described patient to currently be calm and cooperative, presenting with linear thought process and thought content WNL, is AAAOx4, continues to remain in good behavioral control, denies current SI/HI/AVH. RN states patient is preoccupied with discharge. RN states patient has good appetite and has slept well.   TREATMENT:  Per chart, patient did not require PRN medications.   VISITORS: None.

## 2023-10-17 NOTE — PROGRESS NOTE ADULT - PROVIDER SPECIALTY LIST ADULT
Critical Care
Critical Care
Orthopedics
Critical Care
Neurology
Neurology
Critical Care
Neurology

## 2023-10-20 ENCOUNTER — OUTPATIENT (OUTPATIENT)
Dept: OUTPATIENT SERVICES | Facility: HOSPITAL | Age: 28
LOS: 1 days | Discharge: HOME | End: 2023-10-20

## 2023-10-24 DIAGNOSIS — F32.A DEPRESSION, UNSPECIFIED: ICD-10-CM

## 2023-10-24 DIAGNOSIS — F12.20 CANNABIS DEPENDENCE, UNCOMPLICATED: ICD-10-CM

## 2023-10-24 DIAGNOSIS — F41.9 ANXIETY DISORDER, UNSPECIFIED: ICD-10-CM

## 2024-10-10 NOTE — ED ADULT NURSE NOTE - CHIEF COMPLAINT
LABGLOM >90 09/30/2024 09:53 AM    GLUCOSE 97 09/30/2024 09:53 AM    CALCIUM 9.2 09/30/2024 09:53 AM    BILITOT 1.5 09/30/2024 09:53 AM    ALKPHOS 50 09/30/2024 09:53 AM    AST 24 09/30/2024 09:53 AM    ALT 32 09/30/2024 09:53 AM       POC Tests:   Recent Labs     10/10/24  0649   POCGLU 122*       Coags: No results found for: \"PROTIME\", \"INR\", \"APTT\"    HCG (If Applicable): No results found for: \"PREGTESTUR\", \"PREGSERUM\", \"HCG\", \"HCGQUANT\"     ABGs: No results found for: \"PHART\", \"PO2ART\", \"XVY0WQH\", \"OXD0UMK\", \"BEART\", \"F1YDFVPI\"     Type & Screen (If Applicable):  Lab Results   Component Value Date    ABORH O POSITIVE 09/30/2024    LABANTI NEG 09/30/2024       Drug/Infectious Status (If Applicable):  No results found for: \"HIV\", \"HEPCAB\"    COVID-19 Screening (If Applicable): No results found for: \"COVID19\"        Anesthesia Evaluation  Patient summary reviewed and Nursing notes reviewed  Airway: Mallampati: II  TM distance: >3 FB   Neck ROM: full  Mouth opening: > = 3 FB   Dental:    (+) caps      Pulmonary:Negative Pulmonary ROS and normal exam  breath sounds clear to auscultation                             Cardiovascular:  Exercise tolerance: good (>4 METS)  (+) hypertension:        Rhythm: regular  Rate: normal                    Neuro/Psych:   Negative Neuro/Psych ROS              GI/Hepatic/Renal:             Endo/Other:    (+) DiabetesType II DM.                  ROS comment: Mounjaro held [id number] Abdominal:             Vascular: negative vascular ROS.         Other Findings:             Anesthesia Plan      general     ASA 2       Induction: intravenous.    MIPS: Postoperative opioids intended and Prophylactic antiemetics administered.  Anesthetic plan and risks discussed with patient.        Attending anesthesiologist reviewed and agrees with Preprocedure content                Vern Gray MD   10/10/2024             The patient is a 23y Male complaining of

## 2025-04-01 NOTE — H&P ADULT - NSHPPHYSICALEXAM_GEN_ALL_CORE
Lipid abnormalities are stable    Plan:  Continue same medication/s without change.      Discussed medication dosage, use, side effects, and goals of treatment in detail.    Counseled patient on lifestyle modifications to help control hyperlipidemia.     Patient Treatment Goals:   LDL goal is less than 70    Followup in 3 months.   T(C): 36.9 (10-11-23 @ 18:43), Max: 37.7 (10-11-23 @ 16:13)  HR: 60 (10-11-23 @ 20:33) (60 - 127)  BP: 111/63 (10-11-23 @ 20:33) (111/63 - 209/118)  RR: 18 (10-11-23 @ 20:33) (18 - 23)  SpO2: 100% (10-11-23 @ 20:33) (100% - 100%)      General: No acute distress.  Intubated and Sedated. Appears stated age.   HEENT: Pupils equal and symmetrically reactive to light.  PULM: Clear to auscultation bilaterally.  CVS: Regular rate and rhythm, no murmurs, rubs, or gallops.  ABD: Soft, nondistended, no masses.  EXT: No edema.  MSK:  SKIN: Warm and well perfused, no rashes.  PSYCH: RUTH 2/2 intubated / sedated T(C): 36.9 (10-11-23 @ 18:43), Max: 37.7 (10-11-23 @ 16:13)  HR: 60 (10-11-23 @ 20:33) (60 - 127)  BP: 111/63 (10-11-23 @ 20:33) (111/63 - 209/118)  RR: 18 (10-11-23 @ 20:33) (18 - 23)  SpO2: 100% (10-11-23 @ 20:33) (100% - 100%)      General: No acute distress.  Intubated and Sedated. Appears stated age.   HEENT: Pupils equal and symmetrically reactive to light.  PULM: Clear to auscultation bilaterally.  CVS: Regular rate and rhythm, no murmurs, rubs, or gallops.  ABD: Soft, nondistended, no masses.  EXT: No edema.  MSK: SPENCER x4, normal ROM  SKIN: Warm and well perfused, no rashes.  PSYCH: RUTH 2/2 intubated / sedated